# Patient Record
Sex: FEMALE | Race: WHITE | Employment: OTHER | URBAN - NONMETROPOLITAN AREA
[De-identification: names, ages, dates, MRNs, and addresses within clinical notes are randomized per-mention and may not be internally consistent; named-entity substitution may affect disease eponyms.]

---

## 2017-04-11 ENCOUNTER — OFFICE VISIT (OUTPATIENT)
Dept: FAMILY MEDICINE CLINIC | Age: 72
End: 2017-04-11

## 2017-04-11 VITALS
WEIGHT: 144 LBS | HEIGHT: 62 IN | HEART RATE: 60 BPM | OXYGEN SATURATION: 97 % | BODY MASS INDEX: 26.5 KG/M2 | DIASTOLIC BLOOD PRESSURE: 68 MMHG | SYSTOLIC BLOOD PRESSURE: 128 MMHG

## 2017-04-11 DIAGNOSIS — M65.312 TRIGGER THUMB OF LEFT HAND: ICD-10-CM

## 2017-04-11 DIAGNOSIS — Z01.818 PREOPERATIVE CLEARANCE: Primary | ICD-10-CM

## 2017-04-11 DIAGNOSIS — E78.5 HYPERLIPIDEMIA, UNSPECIFIED HYPERLIPIDEMIA TYPE: ICD-10-CM

## 2017-04-11 DIAGNOSIS — I10 ESSENTIAL HYPERTENSION: ICD-10-CM

## 2017-04-11 DIAGNOSIS — Z12.31 SCREENING MAMMOGRAM, ENCOUNTER FOR: ICD-10-CM

## 2017-04-11 DIAGNOSIS — E78.2 MIXED HYPERLIPIDEMIA: ICD-10-CM

## 2017-04-11 PROCEDURE — 99214 OFFICE O/P EST MOD 30 MIN: CPT | Performed by: FAMILY MEDICINE

## 2017-04-11 RX ORDER — FENOFIBRATE 160 MG/1
TABLET ORAL
Qty: 90 TABLET | Refills: 2
Start: 2017-04-11 | End: 2018-06-05 | Stop reason: SDUPTHER

## 2017-04-11 ASSESSMENT — ENCOUNTER SYMPTOMS
BLURRED VISION: 0
ABDOMINAL PAIN: 0
SHORTNESS OF BREATH: 0

## 2017-05-02 ENCOUNTER — HOSPITAL ENCOUNTER (OUTPATIENT)
Dept: PREADMISSION TESTING | Age: 72
Discharge: HOME | End: 2017-05-02
Payer: MEDICARE

## 2017-05-02 VITALS
HEIGHT: 61 IN | BODY MASS INDEX: 27.94 KG/M2 | WEIGHT: 148 LBS | OXYGEN SATURATION: 97 % | TEMPERATURE: 97.4 F | HEART RATE: 55 BPM | RESPIRATION RATE: 16 BRPM | DIASTOLIC BLOOD PRESSURE: 73 MMHG | SYSTOLIC BLOOD PRESSURE: 157 MMHG

## 2017-05-02 LAB
ANION GAP SERPL CALCULATED.3IONS-SCNC: 10 MEQ/L (ref 7–13)
BUN BLDV-MCNC: 12 MG/DL (ref 8–23)
CALCIUM SERPL-MCNC: 10.4 MG/DL (ref 8.6–10.2)
CHLORIDE BLD-SCNC: 104 MEQ/L (ref 98–107)
CO2: 28 MEQ/L (ref 22–29)
CREAT SERPL-MCNC: 0.77 MG/DL (ref 0.5–0.9)
GFR AFRICAN AMERICAN: >60
GFR NON-AFRICAN AMERICAN: >60
GLUCOSE BLD-MCNC: 81 MG/DL (ref 74–109)
HCT VFR BLD CALC: 45.6 % (ref 37–47)
HEMOGLOBIN: 15.1 G/DL (ref 12–16)
MCH RBC QN AUTO: 29.4 PG (ref 27–31.3)
MCHC RBC AUTO-ENTMCNC: 33 % (ref 33–37)
MCV RBC AUTO: 89 FL (ref 82–100)
PDW BLD-RTO: 13.9 % (ref 11.5–14.5)
PLATELET # BLD: 212 K/UL (ref 130–400)
POTASSIUM SERPL-SCNC: 5.3 MEQ/L (ref 3.5–5.1)
RBC # BLD: 5.13 M/UL (ref 4.2–5.4)
SODIUM BLD-SCNC: 142 MEQ/L (ref 132–144)
WBC # BLD: 8.3 K/UL (ref 4.8–10.8)

## 2017-05-02 PROCEDURE — 85027 COMPLETE CBC AUTOMATED: CPT

## 2017-05-02 PROCEDURE — 93005 ELECTROCARDIOGRAM TRACING: CPT

## 2017-05-02 PROCEDURE — 80048 BASIC METABOLIC PNL TOTAL CA: CPT

## 2017-05-02 RX ORDER — SODIUM CHLORIDE 0.9 % (FLUSH) 0.9 %
10 SYRINGE (ML) INJECTION PRN
Status: CANCELLED | OUTPATIENT
Start: 2017-05-02

## 2017-05-02 RX ORDER — SODIUM CHLORIDE 0.9 % (FLUSH) 0.9 %
10 SYRINGE (ML) INJECTION EVERY 12 HOURS SCHEDULED
Status: CANCELLED | OUTPATIENT
Start: 2017-05-02

## 2017-05-02 RX ORDER — SODIUM CHLORIDE, SODIUM LACTATE, POTASSIUM CHLORIDE, CALCIUM CHLORIDE 600; 310; 30; 20 MG/100ML; MG/100ML; MG/100ML; MG/100ML
INJECTION, SOLUTION INTRAVENOUS CONTINUOUS
Status: CANCELLED | OUTPATIENT
Start: 2017-05-02

## 2017-05-02 RX ORDER — LIDOCAINE HYDROCHLORIDE 10 MG/ML
1 INJECTION, SOLUTION EPIDURAL; INFILTRATION; INTRACAUDAL; PERINEURAL
Status: CANCELLED | OUTPATIENT
Start: 2017-05-02 | End: 2017-05-02

## 2017-05-04 LAB
EKG ATRIAL RATE: 53 BPM
EKG P AXIS: 53 DEGREES
EKG P-R INTERVAL: 188 MS
EKG Q-T INTERVAL: 428 MS
EKG QRS DURATION: 84 MS
EKG QTC CALCULATION (BAZETT): 401 MS
EKG R AXIS: 14 DEGREES
EKG T AXIS: 69 DEGREES
EKG VENTRICULAR RATE: 53 BPM

## 2017-05-08 ENCOUNTER — ANESTHESIA EVENT (OUTPATIENT)
Dept: OPERATING ROOM | Age: 72
End: 2017-05-08
Payer: MEDICARE

## 2017-05-09 ENCOUNTER — ANESTHESIA (OUTPATIENT)
Dept: OPERATING ROOM | Age: 72
End: 2017-05-09
Payer: MEDICARE

## 2017-05-09 ENCOUNTER — HOSPITAL ENCOUNTER (OUTPATIENT)
Age: 72
Setting detail: OUTPATIENT SURGERY
Discharge: HOME | End: 2017-05-09
Attending: ORTHOPAEDIC SURGERY | Admitting: ORTHOPAEDIC SURGERY
Payer: MEDICARE

## 2017-05-09 VITALS
RESPIRATION RATE: 16 BRPM | SYSTOLIC BLOOD PRESSURE: 150 MMHG | OXYGEN SATURATION: 97 % | TEMPERATURE: 98.7 F | DIASTOLIC BLOOD PRESSURE: 71 MMHG | HEART RATE: 50 BPM

## 2017-05-09 VITALS
RESPIRATION RATE: 18 BRPM | OXYGEN SATURATION: 98 % | DIASTOLIC BLOOD PRESSURE: 58 MMHG | SYSTOLIC BLOOD PRESSURE: 123 MMHG

## 2017-05-09 PROCEDURE — 7100000010 HC PHASE II RECOVERY - FIRST 15 MIN: Performed by: ORTHOPAEDIC SURGERY

## 2017-05-09 PROCEDURE — 2580000003 HC RX 258: Performed by: ORTHOPAEDIC SURGERY

## 2017-05-09 PROCEDURE — 6360000002 HC RX W HCPCS: Performed by: NURSE ANESTHETIST, CERTIFIED REGISTERED

## 2017-05-09 PROCEDURE — 3700000000 HC ANESTHESIA ATTENDED CARE: Performed by: ORTHOPAEDIC SURGERY

## 2017-05-09 PROCEDURE — 2500000003 HC RX 250 WO HCPCS: Performed by: ORTHOPAEDIC SURGERY

## 2017-05-09 PROCEDURE — 3600000002 HC SURGERY LEVEL 2 BASE: Performed by: ORTHOPAEDIC SURGERY

## 2017-05-09 PROCEDURE — 2580000003 HC RX 258: Performed by: STUDENT IN AN ORGANIZED HEALTH CARE EDUCATION/TRAINING PROGRAM

## 2017-05-09 PROCEDURE — 3600000012 HC SURGERY LEVEL 2 ADDTL 15MIN: Performed by: ORTHOPAEDIC SURGERY

## 2017-05-09 PROCEDURE — 2500000003 HC RX 250 WO HCPCS: Performed by: STUDENT IN AN ORGANIZED HEALTH CARE EDUCATION/TRAINING PROGRAM

## 2017-05-09 PROCEDURE — 6360000002 HC RX W HCPCS: Performed by: ORTHOPAEDIC SURGERY

## 2017-05-09 PROCEDURE — 6370000000 HC RX 637 (ALT 250 FOR IP): Performed by: ORTHOPAEDIC SURGERY

## 2017-05-09 PROCEDURE — 7100000011 HC PHASE II RECOVERY - ADDTL 15 MIN: Performed by: ORTHOPAEDIC SURGERY

## 2017-05-09 PROCEDURE — 2500000003 HC RX 250 WO HCPCS: Performed by: NURSE ANESTHETIST, CERTIFIED REGISTERED

## 2017-05-09 PROCEDURE — 3700000001 HC ADD 15 MINUTES (ANESTHESIA): Performed by: ORTHOPAEDIC SURGERY

## 2017-05-09 RX ORDER — MEPERIDINE HYDROCHLORIDE 25 MG/ML
12.5 INJECTION INTRAMUSCULAR; INTRAVENOUS; SUBCUTANEOUS EVERY 5 MIN PRN
Status: DISCONTINUED | OUTPATIENT
Start: 2017-05-09 | End: 2017-05-09 | Stop reason: HOSPADM

## 2017-05-09 RX ORDER — LIDOCAINE HYDROCHLORIDE 10 MG/ML
1 INJECTION, SOLUTION EPIDURAL; INFILTRATION; INTRACAUDAL; PERINEURAL
Status: COMPLETED | OUTPATIENT
Start: 2017-05-09 | End: 2017-05-09

## 2017-05-09 RX ORDER — OXYCODONE HYDROCHLORIDE AND ACETAMINOPHEN 5; 325 MG/1; MG/1
1 TABLET ORAL EVERY 4 HOURS PRN
Status: DISCONTINUED | OUTPATIENT
Start: 2017-05-09 | End: 2017-05-09 | Stop reason: HOSPADM

## 2017-05-09 RX ORDER — ONDANSETRON 2 MG/ML
INJECTION INTRAMUSCULAR; INTRAVENOUS PRN
Status: DISCONTINUED | OUTPATIENT
Start: 2017-05-09 | End: 2017-05-09 | Stop reason: SDUPTHER

## 2017-05-09 RX ORDER — SODIUM CHLORIDE 0.9 % (FLUSH) 0.9 %
10 SYRINGE (ML) INJECTION PRN
Status: DISCONTINUED | OUTPATIENT
Start: 2017-05-09 | End: 2017-05-09 | Stop reason: HOSPADM

## 2017-05-09 RX ORDER — LIDOCAINE HYDROCHLORIDE 20 MG/ML
INJECTION, SOLUTION INFILTRATION; PERINEURAL PRN
Status: DISCONTINUED | OUTPATIENT
Start: 2017-05-09 | End: 2017-05-09 | Stop reason: SDUPTHER

## 2017-05-09 RX ORDER — MORPHINE SULFATE 2 MG/ML
2 INJECTION, SOLUTION INTRAMUSCULAR; INTRAVENOUS
Status: DISCONTINUED | OUTPATIENT
Start: 2017-05-09 | End: 2017-05-09 | Stop reason: HOSPADM

## 2017-05-09 RX ORDER — PROPOFOL 10 MG/ML
INJECTION, EMULSION INTRAVENOUS PRN
Status: DISCONTINUED | OUTPATIENT
Start: 2017-05-09 | End: 2017-05-09 | Stop reason: SDUPTHER

## 2017-05-09 RX ORDER — MIDAZOLAM HYDROCHLORIDE 1 MG/ML
INJECTION INTRAMUSCULAR; INTRAVENOUS PRN
Status: DISCONTINUED | OUTPATIENT
Start: 2017-05-09 | End: 2017-05-09 | Stop reason: SDUPTHER

## 2017-05-09 RX ORDER — ONDANSETRON 2 MG/ML
4 INJECTION INTRAMUSCULAR; INTRAVENOUS EVERY 6 HOURS PRN
Status: DISCONTINUED | OUTPATIENT
Start: 2017-05-09 | End: 2017-05-09 | Stop reason: HOSPADM

## 2017-05-09 RX ORDER — ACETAMINOPHEN 325 MG/1
650 TABLET ORAL EVERY 4 HOURS PRN
Status: DISCONTINUED | OUTPATIENT
Start: 2017-05-09 | End: 2017-05-09 | Stop reason: HOSPADM

## 2017-05-09 RX ORDER — MAGNESIUM HYDROXIDE 1200 MG/15ML
LIQUID ORAL CONTINUOUS PRN
Status: DISCONTINUED | OUTPATIENT
Start: 2017-05-09 | End: 2017-05-09 | Stop reason: HOSPADM

## 2017-05-09 RX ORDER — HYDROCODONE BITARTRATE AND ACETAMINOPHEN 5; 325 MG/1; MG/1
2 TABLET ORAL PRN
Status: DISCONTINUED | OUTPATIENT
Start: 2017-05-09 | End: 2017-05-09 | Stop reason: HOSPADM

## 2017-05-09 RX ORDER — MORPHINE SULFATE 4 MG/ML
4 INJECTION, SOLUTION INTRAMUSCULAR; INTRAVENOUS
Status: DISCONTINUED | OUTPATIENT
Start: 2017-05-09 | End: 2017-05-09 | Stop reason: HOSPADM

## 2017-05-09 RX ORDER — FENTANYL CITRATE 50 UG/ML
INJECTION, SOLUTION INTRAMUSCULAR; INTRAVENOUS PRN
Status: DISCONTINUED | OUTPATIENT
Start: 2017-05-09 | End: 2017-05-09 | Stop reason: SDUPTHER

## 2017-05-09 RX ORDER — METOCLOPRAMIDE HYDROCHLORIDE 5 MG/ML
10 INJECTION INTRAMUSCULAR; INTRAVENOUS
Status: DISCONTINUED | OUTPATIENT
Start: 2017-05-09 | End: 2017-05-09 | Stop reason: HOSPADM

## 2017-05-09 RX ORDER — SODIUM CHLORIDE 9 MG/ML
50 INJECTION, SOLUTION INTRAVENOUS CONTINUOUS
Status: DISCONTINUED | OUTPATIENT
Start: 2017-05-09 | End: 2017-05-09 | Stop reason: HOSPADM

## 2017-05-09 RX ORDER — SODIUM CHLORIDE, SODIUM LACTATE, POTASSIUM CHLORIDE, CALCIUM CHLORIDE 600; 310; 30; 20 MG/100ML; MG/100ML; MG/100ML; MG/100ML
INJECTION, SOLUTION INTRAVENOUS CONTINUOUS
Status: DISCONTINUED | OUTPATIENT
Start: 2017-05-09 | End: 2017-05-09 | Stop reason: HOSPADM

## 2017-05-09 RX ORDER — HYDROCODONE BITARTRATE AND ACETAMINOPHEN 5; 325 MG/1; MG/1
1 TABLET ORAL PRN
Status: DISCONTINUED | OUTPATIENT
Start: 2017-05-09 | End: 2017-05-09 | Stop reason: HOSPADM

## 2017-05-09 RX ORDER — BUPIVACAINE HYDROCHLORIDE 5 MG/ML
INJECTION, SOLUTION EPIDURAL; INTRACAUDAL PRN
Status: DISCONTINUED | OUTPATIENT
Start: 2017-05-09 | End: 2017-05-09 | Stop reason: HOSPADM

## 2017-05-09 RX ORDER — FENTANYL CITRATE 50 UG/ML
50 INJECTION, SOLUTION INTRAMUSCULAR; INTRAVENOUS EVERY 10 MIN PRN
Status: DISCONTINUED | OUTPATIENT
Start: 2017-05-09 | End: 2017-05-09 | Stop reason: HOSPADM

## 2017-05-09 RX ORDER — SODIUM CHLORIDE 0.9 % (FLUSH) 0.9 %
10 SYRINGE (ML) INJECTION EVERY 12 HOURS SCHEDULED
Status: DISCONTINUED | OUTPATIENT
Start: 2017-05-09 | End: 2017-05-09 | Stop reason: HOSPADM

## 2017-05-09 RX ORDER — DEXAMETHASONE SODIUM PHOSPHATE 10 MG/ML
INJECTION INTRAMUSCULAR; INTRAVENOUS PRN
Status: DISCONTINUED | OUTPATIENT
Start: 2017-05-09 | End: 2017-05-09 | Stop reason: SDUPTHER

## 2017-05-09 RX ORDER — ONDANSETRON 2 MG/ML
4 INJECTION INTRAMUSCULAR; INTRAVENOUS
Status: DISCONTINUED | OUTPATIENT
Start: 2017-05-09 | End: 2017-05-09 | Stop reason: HOSPADM

## 2017-05-09 RX ORDER — PROPOFOL 10 MG/ML
INJECTION, EMULSION INTRAVENOUS CONTINUOUS PRN
Status: DISCONTINUED | OUTPATIENT
Start: 2017-05-09 | End: 2017-05-09 | Stop reason: SDUPTHER

## 2017-05-09 RX ORDER — GINSENG 100 MG
CAPSULE ORAL PRN
Status: DISCONTINUED | OUTPATIENT
Start: 2017-05-09 | End: 2017-05-09 | Stop reason: HOSPADM

## 2017-05-09 RX ORDER — OXYCODONE HYDROCHLORIDE AND ACETAMINOPHEN 5; 325 MG/1; MG/1
2 TABLET ORAL EVERY 4 HOURS PRN
Status: DISCONTINUED | OUTPATIENT
Start: 2017-05-09 | End: 2017-05-09 | Stop reason: HOSPADM

## 2017-05-09 RX ORDER — DIPHENHYDRAMINE HYDROCHLORIDE 50 MG/ML
12.5 INJECTION INTRAMUSCULAR; INTRAVENOUS
Status: DISCONTINUED | OUTPATIENT
Start: 2017-05-09 | End: 2017-05-09 | Stop reason: HOSPADM

## 2017-05-09 RX ORDER — SODIUM CHLORIDE 0.9 % (FLUSH) 0.9 %
SYRINGE (ML) INJECTION
Status: DISCONTINUED
Start: 2017-05-09 | End: 2017-05-09 | Stop reason: HOSPADM

## 2017-05-09 RX ADMIN — LIDOCAINE HYDROCHLORIDE 0.1 ML: 10 INJECTION, SOLUTION EPIDURAL; INFILTRATION; INTRACAUDAL; PERINEURAL at 06:28

## 2017-05-09 RX ADMIN — Medication 2 G: at 07:22

## 2017-05-09 RX ADMIN — DEXAMETHASONE SODIUM PHOSPHATE 4 MG: 10 INJECTION INTRAMUSCULAR; INTRAVENOUS at 07:37

## 2017-05-09 RX ADMIN — ONDANSETRON 4 MG: 2 INJECTION, SOLUTION INTRAMUSCULAR; INTRAVENOUS at 07:37

## 2017-05-09 RX ADMIN — MIDAZOLAM HYDROCHLORIDE 2 MG: 1 INJECTION, SOLUTION INTRAMUSCULAR; INTRAVENOUS at 07:22

## 2017-05-09 RX ADMIN — PROPOFOL 120 MCG/KG/MIN: 10 INJECTION, EMULSION INTRAVENOUS at 07:27

## 2017-05-09 RX ADMIN — PROPOFOL 30 MG: 10 INJECTION, EMULSION INTRAVENOUS at 07:27

## 2017-05-09 RX ADMIN — SODIUM CHLORIDE, POTASSIUM CHLORIDE, SODIUM LACTATE AND CALCIUM CHLORIDE: 600; 310; 30; 20 INJECTION, SOLUTION INTRAVENOUS at 06:29

## 2017-05-09 RX ADMIN — LIDOCAINE HYDROCHLORIDE 60 MG: 20 INJECTION, SOLUTION INFILTRATION; PERINEURAL at 07:27

## 2017-05-09 RX ADMIN — FENTANYL CITRATE 25 MCG: 50 INJECTION, SOLUTION INTRAMUSCULAR; INTRAVENOUS at 07:37

## 2017-06-05 DIAGNOSIS — I10 ESSENTIAL HYPERTENSION: ICD-10-CM

## 2017-06-05 RX ORDER — CARVEDILOL 6.25 MG/1
TABLET ORAL
Qty: 180 TABLET | Refills: 3 | Status: SHIPPED | OUTPATIENT
Start: 2017-06-05 | End: 2018-08-25 | Stop reason: SDUPTHER

## 2017-06-05 RX ORDER — ATORVASTATIN CALCIUM 10 MG/1
10 TABLET, FILM COATED ORAL NIGHTLY
Qty: 90 TABLET | Refills: 3 | Status: SHIPPED | OUTPATIENT
Start: 2017-06-05 | End: 2018-05-03 | Stop reason: SDUPTHER

## 2017-06-23 ENCOUNTER — HOSPITAL ENCOUNTER (OUTPATIENT)
Dept: WOMENS IMAGING | Age: 72
Discharge: HOME | End: 2017-06-23
Payer: MEDICARE

## 2017-06-23 DIAGNOSIS — Z12.31 SCREENING MAMMOGRAM, ENCOUNTER FOR: ICD-10-CM

## 2017-06-23 PROCEDURE — 77063 BREAST TOMOSYNTHESIS BI: CPT

## 2017-10-23 ENCOUNTER — TELEPHONE (OUTPATIENT)
Dept: FAMILY MEDICINE CLINIC | Age: 72
End: 2017-10-23

## 2017-11-14 ENCOUNTER — OFFICE VISIT (OUTPATIENT)
Dept: FAMILY MEDICINE CLINIC | Age: 72
End: 2017-11-14

## 2017-11-14 VITALS
OXYGEN SATURATION: 98 % | WEIGHT: 143.6 LBS | SYSTOLIC BLOOD PRESSURE: 116 MMHG | HEART RATE: 65 BPM | BODY MASS INDEX: 26.43 KG/M2 | HEIGHT: 62 IN | DIASTOLIC BLOOD PRESSURE: 62 MMHG

## 2017-11-14 DIAGNOSIS — E78.2 MIXED HYPERLIPIDEMIA: ICD-10-CM

## 2017-11-14 DIAGNOSIS — I10 ESSENTIAL HYPERTENSION: ICD-10-CM

## 2017-11-14 DIAGNOSIS — R06.2 WHEEZING: Primary | ICD-10-CM

## 2017-11-14 PROCEDURE — 99214 OFFICE O/P EST MOD 30 MIN: CPT | Performed by: FAMILY MEDICINE

## 2017-11-14 RX ORDER — AZITHROMYCIN 250 MG/1
TABLET, FILM COATED ORAL
Qty: 1 PACKET | Refills: 0 | Status: SHIPPED | OUTPATIENT
Start: 2017-11-14 | End: 2017-11-24

## 2017-11-14 RX ORDER — ZOLPIDEM TARTRATE 10 MG/1
10 TABLET ORAL NIGHTLY PRN
Qty: 14 TABLET | Refills: 0 | Status: SHIPPED | OUTPATIENT
Start: 2017-11-14 | End: 2017-11-28

## 2017-11-14 ASSESSMENT — ENCOUNTER SYMPTOMS
WHEEZING: 1
SHORTNESS OF BREATH: 0
BLURRED VISION: 0
ABDOMINAL PAIN: 0

## 2017-11-14 NOTE — PROGRESS NOTES
Subjective  Link Antolin, 67 y.o. female presents today with:  Chief Complaint   Patient presents with   Ciaran Perry     is having some wheezing more at night       Hypertension   This is a chronic problem. The current episode started more than 1 year ago. The problem has been rapidly improving since onset. The problem is controlled. Pertinent negatives include no blurred vision, chest pain, palpitations or shortness of breath. There are no associated agents to hypertension. Risk factors for coronary artery disease include dyslipidemia and post-menopausal state. Past treatments include beta blockers. The current treatment provides significant improvement. There is no history of kidney disease. There is no history of chronic renal disease. Wheezing    This is a chronic problem. The current episode started more than 1 year ago. The problem occurs intermittently. The problem has been waxing and waning. Pertinent negatives include no abdominal pain, chest pain, fever, rash or shortness of breath. Nothing aggravates the symptoms. She has tried nothing for the symptoms. Here today for 6 month f/u on chronic problems including HTN, hyperlipidemia. She c/o wheezing at night. Going to [facility] in Jan requesting wojciech and Sarah Hua. Review of Systems   Constitutional: Negative for fever. Eyes: Negative for blurred vision. Respiratory: Positive for wheezing. Negative for shortness of breath. Cardiovascular: Negative for chest pain and palpitations. Gastrointestinal: Negative for abdominal pain. Skin: Negative for rash.        Past Medical History:   Diagnosis Date    Abnormal EKG     BPPV (benign paroxysmal positional vertigo)     Colon polyps 09/2016    needs f/u in 5 years    Elevated liver enzymes 11/5/2013    Family history of heart disease     Heart murmur     History of bone density study 4/7/11    History of mammography, diagnostic 4/13/11    Category 3 Left mammogram/ Category 2 Right mammogram    Hyperlipidemia     Hypertension     Internal hemorrhoids     Normal cardiac stress test 12/09/2015    Osteoarthritis, multiple sites     shoulders and knees    Osteopenia 06/09/2015    Recurrent cold sores     Rheumatic fever     Trigger thumb of left hand      Past Surgical History:   Procedure Laterality Date    COLONOSCOPY  7/12/2006    COLONOSCOPY  09/12/2016    EN MYERS MD    [place] INCISE FINGER TENDON SHEATH Left 5/9/2017    LEFT HAND  FINGER TRIGGER RELEASE THUMB, SUPINE  performed by Rebecca Neely MD at [place] [address] Marital status:      Spouse name: N/A    Number of children: 2    Years of education: N/A     Occupational History    retired      Social History Main Topics    Smoking status: Former Smoker     Types: Cigarettes    Smokeless tobacco: Never Used      Comment: quit in her 42's    Alcohol use 0.0 oz/week      Comment: wine 2 glasses per week    Drug use: No    Sexual activity: Not on file     Other Topics Concern    Not on file     Social History Narrative    Has children that live in [place]     Family History   Problem Relation Age of Onset    Asthma Father     Diabetes Father     Heart Disease Mother     Diabetes Brother     Heart Disease Brother     Kidney Disease Brother     Other Daughter      shoulder surgery     No Known Allergies  Current Outpatient Prescriptions   Medication Sig Dispense Refill    azithromycin (ZITHROMAX) 250 MG tablet Take 2 tabs (500 mg) on Day 1, and take 1 tab (250 mg) on days 2 through 5. 1 packet 0    zolpidem (AMBIEN) 10 MG tablet Take 1 tablet by mouth nightly as needed for Sleep  1/2 to 1 to po qhs prn insomnia.  14 tablet 0    carvedilol (COREG) 6.25 MG tablet TAKE 1 TABLET TWICE A  tablet 3    atorvastatin (LIPITOR) 10 MG tablet Take 1 tablet by mouth nightly 90 tablet 3    fenofibrate 160 MG tablet Take 1/2 pill daily 90 tablet 2    acyclovir (ZOVIRAX) 5 % ointment

## 2017-12-04 DIAGNOSIS — I10 ESSENTIAL HYPERTENSION: ICD-10-CM

## 2017-12-04 DIAGNOSIS — E78.2 MIXED HYPERLIPIDEMIA: ICD-10-CM

## 2017-12-04 LAB
ALT SERPL-CCNC: 23 U/L (ref 0–33)
ANION GAP SERPL CALCULATED.3IONS-SCNC: 13 MEQ/L (ref 7–13)
AST SERPL-CCNC: 21 U/L (ref 0–35)
BASOPHILS ABSOLUTE: 0.1 K/UL (ref 0–0.2)
BASOPHILS RELATIVE PERCENT: 0.8 %
BUN BLDV-MCNC: 12 MG/DL (ref 8–23)
CALCIUM SERPL-MCNC: 9.6 MG/DL (ref 8.6–10.2)
CHLORIDE BLD-SCNC: 103 MEQ/L (ref 98–107)
CHOLESTEROL, TOTAL: 140 MG/DL (ref 0–199)
CO2: 28 MEQ/L (ref 22–29)
CREAT SERPL-MCNC: 0.7 MG/DL (ref 0.5–0.9)
EOSINOPHILS ABSOLUTE: 0.4 K/UL (ref 0–0.7)
EOSINOPHILS RELATIVE PERCENT: 5.8 %
GFR AFRICAN AMERICAN: >60
GFR NON-AFRICAN AMERICAN: >60
GLUCOSE BLD-MCNC: 103 MG/DL (ref 74–109)
HCT VFR BLD CALC: 47.2 % (ref 37–47)
HDLC SERPL-MCNC: 42 MG/DL (ref 40–59)
HEMOGLOBIN: 14.9 G/DL (ref 12–16)
LDL CHOLESTEROL CALCULATED: 71 MG/DL (ref 0–129)
LYMPHOCYTES ABSOLUTE: 2.1 K/UL (ref 1–4.8)
LYMPHOCYTES RELATIVE PERCENT: 29 %
MCH RBC QN AUTO: 29.1 PG (ref 27–31.3)
MCHC RBC AUTO-ENTMCNC: 31.6 % (ref 33–37)
MCV RBC AUTO: 91.9 FL (ref 82–100)
MONOCYTES ABSOLUTE: 0.6 K/UL (ref 0.2–0.8)
MONOCYTES RELATIVE PERCENT: 8.5 %
NEUTROPHILS ABSOLUTE: 4.1 K/UL (ref 1.4–6.5)
NEUTROPHILS RELATIVE PERCENT: 55.9 %
PDW BLD-RTO: 13.8 % (ref 11.5–14.5)
PLATELET # BLD: 221 K/UL (ref 130–400)
POTASSIUM SERPL-SCNC: 4.9 MEQ/L (ref 3.5–5.1)
RBC # BLD: 5.14 M/UL (ref 4.2–5.4)
SODIUM BLD-SCNC: 144 MEQ/L (ref 132–144)
TRIGL SERPL-MCNC: 137 MG/DL (ref 0–200)
WBC # BLD: 7.3 K/UL (ref 4.8–10.8)

## 2018-01-03 ENCOUNTER — HOSPITAL ENCOUNTER (OUTPATIENT)
Dept: PULMONOLOGY | Age: 73
Discharge: HOME | End: 2018-01-03
Payer: MEDICARE

## 2018-01-03 PROCEDURE — 94726 PLETHYSMOGRAPHY LUNG VOLUMES: CPT

## 2018-01-03 PROCEDURE — 94010 BREATHING CAPACITY TEST: CPT

## 2018-01-03 PROCEDURE — 94729 DIFFUSING CAPACITY: CPT

## 2018-01-15 PROCEDURE — 94729 DIFFUSING CAPACITY: CPT | Performed by: INTERNAL MEDICINE

## 2018-01-15 PROCEDURE — 94010 BREATHING CAPACITY TEST: CPT | Performed by: INTERNAL MEDICINE

## 2018-01-15 PROCEDURE — 94726 PLETHYSMOGRAPHY LUNG VOLUMES: CPT | Performed by: INTERNAL MEDICINE

## 2018-02-01 ENCOUNTER — TELEPHONE (OUTPATIENT)
Dept: FAMILY MEDICINE CLINIC | Age: 73
End: 2018-02-01

## 2018-02-02 ENCOUNTER — OFFICE VISIT (OUTPATIENT)
Dept: FAMILY MEDICINE CLINIC | Age: 73
End: 2018-02-02
Payer: MEDICARE

## 2018-02-02 VITALS
DIASTOLIC BLOOD PRESSURE: 72 MMHG | HEIGHT: 62 IN | HEART RATE: 64 BPM | WEIGHT: 150 LBS | BODY MASS INDEX: 27.6 KG/M2 | TEMPERATURE: 97.4 F | SYSTOLIC BLOOD PRESSURE: 138 MMHG | OXYGEN SATURATION: 97 %

## 2018-02-02 DIAGNOSIS — J20.9 ACUTE BRONCHITIS, UNSPECIFIED ORGANISM: Primary | ICD-10-CM

## 2018-02-02 LAB
INFLUENZA A ANTIBODY: NEGATIVE
INFLUENZA B ANTIBODY: NEGATIVE

## 2018-02-02 PROCEDURE — 99213 OFFICE O/P EST LOW 20 MIN: CPT | Performed by: NURSE PRACTITIONER

## 2018-02-02 PROCEDURE — 87804 INFLUENZA ASSAY W/OPTIC: CPT | Performed by: NURSE PRACTITIONER

## 2018-02-02 RX ORDER — DOXYCYCLINE HYCLATE 100 MG
100 TABLET ORAL 2 TIMES DAILY
Qty: 20 TABLET | Refills: 0 | Status: SHIPPED | OUTPATIENT
Start: 2018-02-02 | End: 2018-02-12

## 2018-02-02 ASSESSMENT — ENCOUNTER SYMPTOMS
SINUS PRESSURE: 0
SORE THROAT: 0
RHINORRHEA: 1
SINUS PAIN: 0
COUGH: 1
WHEEZING: 1
COLOR CHANGE: 0
SHORTNESS OF BREATH: 0

## 2018-02-02 NOTE — PROGRESS NOTES
Subjective  Chief Complaint   Patient presents with    Cough     pt states that she has had a cough for over 6 months but thsi is different she states. just resturned from [place] [facility]. was recently on [employer] Needs that she finished on Sunday. Cough   This is a recurrent problem. The current episode started 1 to 4 weeks ago. The problem has been gradually worsening. The cough is productive of sputum (no color to it). Associated symptoms include headaches (due to cough), postnasal drip, rhinorrhea and wheezing (this has been ongoing ). Pertinent negatives include no chest pain, chills, ear congestion, ear pain, fever, nasal congestion, rash, sore throat, shortness of breath or sweats. The symptoms are aggravated by cold air and lying down. Treatments tried: z pack. The treatment provided mild relief. Her past medical history is significant for bronchitis. The cough has been an ongoing issue and Dr. Luda Valentin gave her a zpack because she was traveling and she did start it Wednesday because this cough is much different than the cough she has been working her up for. Patient had PFTs January 3rd.        Patient Active Problem List    Diagnosis Date Noted    Cough     Osteoarthritis, multiple sites     Recurrent cold sores     BPPV (benign paroxysmal positional vertigo)     Hypertension 09/07/2012    Hyperlipidemia 09/07/2012     Past Medical History:   Diagnosis Date    Abnormal EKG     BPPV (benign paroxysmal positional vertigo)     Colon polyps 09/2016    needs f/u in 5 years    Elevated liver enzymes 11/5/2013    Family history of heart disease     Heart murmur     History of bone density study 4/7/11    History of mammography, diagnostic 4/13/11    Category 3 Left mammogram/ Category 2 Right mammogram    Hyperlipidemia     Hypertension     Internal hemorrhoids     Normal cardiac stress test 12/09/2015    Osteoarthritis, multiple sites     shoulders and knees    Osteopenia 06/09/2015

## 2018-05-03 ENCOUNTER — OFFICE VISIT (OUTPATIENT)
Dept: FAMILY MEDICINE CLINIC | Age: 73
End: 2018-05-03
Payer: MEDICARE

## 2018-05-03 VITALS
HEART RATE: 60 BPM | DIASTOLIC BLOOD PRESSURE: 52 MMHG | BODY MASS INDEX: 27.53 KG/M2 | HEIGHT: 62 IN | OXYGEN SATURATION: 96 % | SYSTOLIC BLOOD PRESSURE: 108 MMHG | WEIGHT: 149.6 LBS

## 2018-05-03 DIAGNOSIS — Z12.31 SCREENING MAMMOGRAM, ENCOUNTER FOR: ICD-10-CM

## 2018-05-03 DIAGNOSIS — N81.10 FEMALE BLADDER PROLAPSE: ICD-10-CM

## 2018-05-03 DIAGNOSIS — T23.222A PARTIAL THICKNESS BURN OF FINGER OF LEFT HAND, INITIAL ENCOUNTER: ICD-10-CM

## 2018-05-03 DIAGNOSIS — I10 ESSENTIAL HYPERTENSION: Primary | ICD-10-CM

## 2018-05-03 DIAGNOSIS — E78.2 MIXED HYPERLIPIDEMIA: ICD-10-CM

## 2018-05-03 PROCEDURE — G8510 SCR DEP NEG, NO PLAN REQD: HCPCS | Performed by: FAMILY MEDICINE

## 2018-05-03 PROCEDURE — 99213 OFFICE O/P EST LOW 20 MIN: CPT | Performed by: FAMILY MEDICINE

## 2018-05-03 PROCEDURE — 3288F FALL RISK ASSESSMENT DOCD: CPT | Performed by: FAMILY MEDICINE

## 2018-05-03 RX ORDER — ATORVASTATIN CALCIUM 10 MG/1
10 TABLET, FILM COATED ORAL NIGHTLY
Qty: 90 TABLET | Refills: 3 | Status: ON HOLD | OUTPATIENT
Start: 2018-05-03 | End: 2019-02-14

## 2018-05-03 ASSESSMENT — PATIENT HEALTH QUESTIONNAIRE - PHQ9
2. FEELING DOWN, DEPRESSED OR HOPELESS: 0
SUM OF ALL RESPONSES TO PHQ QUESTIONS 1-9: 0
1. LITTLE INTEREST OR PLEASURE IN DOING THINGS: 0
SUM OF ALL RESPONSES TO PHQ9 QUESTIONS 1 & 2: 0

## 2018-05-03 ASSESSMENT — ENCOUNTER SYMPTOMS
ABDOMINAL PAIN: 0
SHORTNESS OF BREATH: 0

## 2018-05-15 ENCOUNTER — OFFICE VISIT (OUTPATIENT)
Dept: FAMILY MEDICINE CLINIC | Age: 73
End: 2018-05-15
Payer: MEDICARE

## 2018-05-15 VITALS
BODY MASS INDEX: 27.23 KG/M2 | DIASTOLIC BLOOD PRESSURE: 70 MMHG | HEIGHT: 62 IN | HEART RATE: 59 BPM | OXYGEN SATURATION: 96 % | SYSTOLIC BLOOD PRESSURE: 130 MMHG | WEIGHT: 148 LBS

## 2018-05-15 DIAGNOSIS — Z23 NEED FOR SHINGLES VACCINE: ICD-10-CM

## 2018-05-15 DIAGNOSIS — Z00.00 ROUTINE GENERAL MEDICAL EXAMINATION AT A HEALTH CARE FACILITY: ICD-10-CM

## 2018-05-15 DIAGNOSIS — Z78.0 ASYMPTOMATIC MENOPAUSAL STATE: ICD-10-CM

## 2018-05-15 DIAGNOSIS — R94.31 EKG ABNORMALITIES: Primary | ICD-10-CM

## 2018-05-15 PROCEDURE — 93000 ELECTROCARDIOGRAM COMPLETE: CPT | Performed by: FAMILY MEDICINE

## 2018-05-15 PROCEDURE — G0439 PPPS, SUBSEQ VISIT: HCPCS | Performed by: FAMILY MEDICINE

## 2018-05-15 ASSESSMENT — LIFESTYLE VARIABLES
HOW OFTEN DURING THE LAST YEAR HAVE YOU NEEDED AN ALCOHOLIC DRINK FIRST THING IN THE MORNING TO GET YOURSELF GOING AFTER A NIGHT OF HEAVY DRINKING: 0
HOW OFTEN DURING THE LAST YEAR HAVE YOU BEEN UNABLE TO REMEMBER WHAT HAPPENED THE NIGHT BEFORE BECAUSE YOU HAD BEEN DRINKING: 0
HAVE YOU OR SOMEONE ELSE BEEN INJURED AS A RESULT OF YOUR DRINKING: 0
AUDIT-C TOTAL SCORE: 2
HOW OFTEN DURING THE LAST YEAR HAVE YOU FAILED TO DO WHAT WAS NORMALLY EXPECTED FROM YOU BECAUSE OF DRINKING: 0
HAS A RELATIVE, FRIEND, DOCTOR, OR ANOTHER HEALTH PROFESSIONAL EXPRESSED CONCERN ABOUT YOUR DRINKING OR SUGGESTED YOU CUT DOWN: 0
AUDIT TOTAL SCORE: 2
HOW OFTEN DURING THE LAST YEAR HAVE YOU FOUND THAT YOU WERE NOT ABLE TO STOP DRINKING ONCE YOU HAD STARTED: 0
HOW OFTEN DO YOU HAVE A DRINK CONTAINING ALCOHOL: 2
HOW OFTEN DO YOU HAVE SIX OR MORE DRINKS ON ONE OCCASION: 0
HOW MANY STANDARD DRINKS CONTAINING ALCOHOL DO YOU HAVE ON A TYPICAL DAY: 0
HOW OFTEN DURING THE LAST YEAR HAVE YOU HAD A FEELING OF GUILT OR REMORSE AFTER DRINKING: 0

## 2018-05-15 ASSESSMENT — ANXIETY QUESTIONNAIRES: GAD7 TOTAL SCORE: 0

## 2018-05-15 ASSESSMENT — PATIENT HEALTH QUESTIONNAIRE - PHQ9: SUM OF ALL RESPONSES TO PHQ QUESTIONS 1-9: 0

## 2018-05-30 ENCOUNTER — HOSPITAL ENCOUNTER (OUTPATIENT)
Dept: NUCLEAR MEDICINE | Age: 73
Discharge: HOME | End: 2018-06-01
Payer: MEDICARE

## 2018-05-30 DIAGNOSIS — R94.31 EKG ABNORMALITIES: ICD-10-CM

## 2018-05-30 PROCEDURE — 78452 HT MUSCLE IMAGE SPECT MULT: CPT

## 2018-05-30 PROCEDURE — 2580000003 HC RX 258: Performed by: FAMILY MEDICINE

## 2018-05-30 PROCEDURE — 93017 CV STRESS TEST TRACING ONLY: CPT

## 2018-05-30 PROCEDURE — 3430000000 HC RX DIAGNOSTIC RADIOPHARMACEUTICAL: Performed by: FAMILY MEDICINE

## 2018-05-30 PROCEDURE — A9502 TC99M TETROFOSMIN: HCPCS | Performed by: FAMILY MEDICINE

## 2018-05-30 RX ORDER — SODIUM CHLORIDE 0.9 % (FLUSH) 0.9 %
10 SYRINGE (ML) INJECTION PRN
Status: DISCONTINUED | OUTPATIENT
Start: 2018-05-30 | End: 2018-06-02 | Stop reason: HOSPADM

## 2018-05-30 RX ADMIN — TETROFOSMIN 11.4 MILLICURIE: 0.23 INJECTION, POWDER, LYOPHILIZED, FOR SOLUTION INTRAVENOUS at 08:45

## 2018-05-30 RX ADMIN — TETROFOSMIN 34 MILLICURIE: 0.23 INJECTION, POWDER, LYOPHILIZED, FOR SOLUTION INTRAVENOUS at 10:36

## 2018-05-30 RX ADMIN — Medication 10 ML: at 08:45

## 2018-05-30 RX ADMIN — Medication 10 ML: at 10:36

## 2018-06-08 ENCOUNTER — TELEPHONE (OUTPATIENT)
Dept: FAMILY MEDICINE CLINIC | Age: 73
End: 2018-06-08

## 2018-06-22 ENCOUNTER — TELEPHONE (OUTPATIENT)
Dept: FAMILY MEDICINE CLINIC | Age: 73
End: 2018-06-22
Payer: MEDICARE

## 2018-06-22 DIAGNOSIS — R30.0 DYSURIA: Primary | ICD-10-CM

## 2018-06-22 DIAGNOSIS — R30.0 DYSURIA: ICD-10-CM

## 2018-06-22 LAB
BILIRUBIN, POC: 0
BLOOD URINE, POC: ABNORMAL
CLARITY, POC: ABNORMAL
COLOR, POC: YELLOW
GLUCOSE URINE, POC: ABNORMAL
KETONES, POC: ABNORMAL
LEUKOCYTE EST, POC: ABNORMAL
NITRITE, POC: ABNORMAL
PH, POC: 7.5
PROTEIN, POC: + 30
SPECIFIC GRAVITY, POC: 1.01
UROBILINOGEN, POC: 0.2

## 2018-06-22 PROCEDURE — 81002 URINALYSIS NONAUTO W/O SCOPE: CPT | Performed by: FAMILY MEDICINE

## 2018-06-22 RX ORDER — NITROFURANTOIN 25; 75 MG/1; MG/1
100 CAPSULE ORAL 2 TIMES DAILY
Qty: 14 CAPSULE | Refills: 0 | Status: SHIPPED | OUTPATIENT
Start: 2018-06-22 | End: 2018-06-29

## 2018-06-25 LAB
ORGANISM: ABNORMAL
URINE CULTURE, ROUTINE: ABNORMAL
URINE CULTURE, ROUTINE: ABNORMAL

## 2018-07-16 ENCOUNTER — OFFICE VISIT (OUTPATIENT)
Dept: OBGYN CLINIC | Age: 73
End: 2018-07-16
Payer: MEDICARE

## 2018-07-16 VITALS
SYSTOLIC BLOOD PRESSURE: 128 MMHG | HEIGHT: 62 IN | BODY MASS INDEX: 26.68 KG/M2 | DIASTOLIC BLOOD PRESSURE: 70 MMHG | WEIGHT: 145 LBS

## 2018-07-16 DIAGNOSIS — R32 URINARY INCONTINENCE, UNSPECIFIED TYPE: ICD-10-CM

## 2018-07-16 DIAGNOSIS — Z01.419 ENCOUNTER FOR WELL WOMAN EXAM WITH ROUTINE GYNECOLOGICAL EXAM: Primary | ICD-10-CM

## 2018-07-16 DIAGNOSIS — Z11.51 SCREENING FOR HPV (HUMAN PAPILLOMAVIRUS): ICD-10-CM

## 2018-07-16 PROCEDURE — 99387 INIT PM E/M NEW PAT 65+ YRS: CPT | Performed by: OBSTETRICS & GYNECOLOGY

## 2018-07-16 PROCEDURE — 99204 OFFICE O/P NEW MOD 45 MIN: CPT | Performed by: OBSTETRICS & GYNECOLOGY

## 2018-07-16 ASSESSMENT — ENCOUNTER SYMPTOMS
ABDOMINAL PAIN: 0
BLOOD IN STOOL: 0
VOMITING: 0
CONSTIPATION: 0
SINUS PRESSURE: 0
WHEEZING: 0
COUGH: 0
SHORTNESS OF BREATH: 0
NAUSEA: 0
COLOR CHANGE: 0

## 2018-07-16 NOTE — PATIENT INSTRUCTIONS
Patient Education        Urodynamic Studies: About These Tests  What are urodynamic studies? Urodynamic studies are a group of tests that show how your body stores and releases urine. The type of test varies from person to person. A simple urodynamic test is done in a doctor's office. Other tests may be done in a hospital or surgery center. Why are these tests done? These tests are done to help find out why a person has symptoms such as:  · Leaking urine. · Feeling the need to urinate often. · Pain when urinating. · A weak stream of urine. · Frequent urinary tract infections. How can you prepare for the test?  You may be asked to arrive for the test with a full bladder. What happens during the test?  For basic urodynamic testing:  · You will urinate into a container while the amount of urine and how fast it flows out of the bladder are measured. · A thin, flexible tube called a catheter is then inserted into the bladder through the urethra. The urethra is the tube that carries urine from the bladder to the outside of the body. This catheter helps measure how much urine is still in the bladder. You may feel a slight burning sensation when the catheter is inserted. · The bladder may be filled with water through the catheter until you have the first urge to urinate. The amount of water in the bladder is measured at this point. Then more water may be added while you resist urinating until you no longer can keep from urinating. · The doctor will remove the catheter. What else should you know about the test?  · Sometimes X-rays are taken during a test. If they are, your bladder may be filled with fluid that will show up on an X-ray. · You may be sore after the test. Soaking in a warm tub may help. How long does the test take? · How long the test will take depends on the type of test you have. Ask your doctor how long your specific test or tests should take.   What happens after the test?  · You will

## 2018-07-16 NOTE — PROGRESS NOTES
PLAN:      No orders of the defined types were placed in this encounter. No orders of the defined types were placed in this encounter. No Follow-up on file. I, [address], am scribing for, and in the presence of, Dr Eldon Jones. Electronically signed by: [address] 7/16/18 8:54 AM    I, Dr Eldon Jones, personally performed the services described in this documentation, as scribed by [address] in my presence, and it is both accurate and complete.  Electronically signed by: Eldon Jones MD

## 2018-07-18 ENCOUNTER — HOSPITAL ENCOUNTER (OUTPATIENT)
Dept: WOMENS IMAGING | Age: 73
Discharge: HOME | End: 2018-07-20
Payer: MEDICARE

## 2018-07-18 DIAGNOSIS — Z78.0 ASYMPTOMATIC MENOPAUSAL STATE: ICD-10-CM

## 2018-07-18 DIAGNOSIS — Z12.31 SCREENING MAMMOGRAM, ENCOUNTER FOR: ICD-10-CM

## 2018-07-18 PROCEDURE — 77063 BREAST TOMOSYNTHESIS BI: CPT

## 2018-07-24 DIAGNOSIS — Z01.419 ENCOUNTER FOR WELL WOMAN EXAM WITH ROUTINE GYNECOLOGICAL EXAM: ICD-10-CM

## 2018-07-24 DIAGNOSIS — Z11.51 SCREENING FOR HPV (HUMAN PAPILLOMAVIRUS): ICD-10-CM

## 2018-08-16 ENCOUNTER — TELEPHONE (OUTPATIENT)
Dept: FAMILY MEDICINE CLINIC | Age: 73
End: 2018-08-16

## 2018-08-16 NOTE — TELEPHONE ENCOUNTER
I cannot recall off hand her telling me about any trip. The best source of information is CDC. gov for travel recommendations and the [place] [id number] [facility] you live-in which typically has a travel clinic for pretravel consultations and vaccines. As far as general guidelines for hepatitis B vaccination it is a series of 3 doses. She could get the first dose of hep B and perhaps the second dose which has to be given a minimum of 4 weeks later. The hepatitis B #3 shot would be given 5 months after the first dose. She can get a hep B shot with a nurse visit only.

## 2018-08-18 ENCOUNTER — HOSPITAL ENCOUNTER (OUTPATIENT)
Dept: ULTRASOUND IMAGING | Age: 73
Discharge: HOME | End: 2018-08-20
Payer: MEDICARE

## 2018-08-18 DIAGNOSIS — R32 URINARY INCONTINENCE, UNSPECIFIED TYPE: ICD-10-CM

## 2018-08-18 PROCEDURE — 76770 US EXAM ABDO BACK WALL COMP: CPT

## 2018-08-18 PROCEDURE — 51798 US URINE CAPACITY MEASURE: CPT

## 2018-08-18 PROCEDURE — 76830 TRANSVAGINAL US NON-OB: CPT

## 2018-08-18 PROCEDURE — 76856 US EXAM PELVIC COMPLETE: CPT

## 2018-09-07 ENCOUNTER — HOSPITAL ENCOUNTER (OUTPATIENT)
Dept: WOMENS IMAGING | Age: 73
Discharge: HOME | End: 2018-09-09
Payer: MEDICARE

## 2018-09-07 PROCEDURE — 77080 DXA BONE DENSITY AXIAL: CPT

## 2018-09-10 ENCOUNTER — OFFICE VISIT (OUTPATIENT)
Dept: FAMILY MEDICINE CLINIC | Age: 73
End: 2018-09-10
Payer: MEDICARE

## 2018-09-10 ENCOUNTER — TELEPHONE (OUTPATIENT)
Dept: FAMILY MEDICINE CLINIC | Age: 73
End: 2018-09-10

## 2018-09-10 VITALS
WEIGHT: 137 LBS | TEMPERATURE: 97.4 F | OXYGEN SATURATION: 98 % | DIASTOLIC BLOOD PRESSURE: 60 MMHG | HEART RATE: 61 BPM | HEIGHT: 62 IN | SYSTOLIC BLOOD PRESSURE: 120 MMHG | BODY MASS INDEX: 25.21 KG/M2

## 2018-09-10 DIAGNOSIS — Z71.84 COUNSELING ABOUT TRAVEL: Primary | ICD-10-CM

## 2018-09-10 DIAGNOSIS — Z23 INFLUENZA VACCINE NEEDED: ICD-10-CM

## 2018-09-10 PROCEDURE — 99397 PER PM REEVAL EST PAT 65+ YR: CPT | Performed by: NURSE PRACTITIONER

## 2018-09-10 PROCEDURE — G0008 ADMIN INFLUENZA VIRUS VAC: HCPCS | Performed by: NURSE PRACTITIONER

## 2018-09-10 PROCEDURE — 90662 IIV NO PRSV INCREASED AG IM: CPT | Performed by: NURSE PRACTITIONER

## 2018-09-10 RX ORDER — AZITHROMYCIN 250 MG/1
TABLET, FILM COATED ORAL
Qty: 1 PACKET | Refills: 0 | Status: SHIPPED | OUTPATIENT
Start: 2018-09-10 | End: 2018-10-22

## 2018-09-10 RX ORDER — ATOVAQUONE AND PROGUANIL HYDROCHLORIDE 250; 100 MG/1; MG/1
1 TABLET, FILM COATED ORAL DAILY
Qty: 24 TABLET | Refills: 0 | Status: CANCELLED | OUTPATIENT
Start: 2018-09-10 | End: 2018-10-04

## 2018-09-10 ASSESSMENT — ENCOUNTER SYMPTOMS
SHORTNESS OF BREATH: 0
COUGH: 0

## 2018-09-10 NOTE — TELEPHONE ENCOUNTER
[facility], the patients sister was administered  At [facility] in [address]- this is what was administered to her sister-Ic  atovaquone proguanil 255-100 18 tablets.

## 2018-09-10 NOTE — PROGRESS NOTES
Vaccine Information Sheet, \"Influenza - Inactivated\"  given to Gabby Black, or parent/legal guardian of  Gabby Black and verbalized understanding. Patient responses:    Have you ever had a reaction to a flu vaccine? No  Are you able to eat eggs without adverse effects? Yes  Do you have any current illness? No  Have you ever had Guillian Christmas Syndrome? No    Flu vaccine given per order. Please see immunization tab.

## 2018-09-10 NOTE — PROGRESS NOTES
Height: 5' 2\" (1.575 m)     Physical Exam   Constitutional: She is oriented to person, place, and time. She appears well-developed and well-nourished. No distress. HENT:   Head: Normocephalic and atraumatic. Pulmonary/Chest: Effort normal.   Neurological: She is alert and oriented to person, place, and time. Skin: Skin is warm and dry. No rash noted. She is not diaphoretic. No erythema. No pallor. Psychiatric: She has a normal mood and affect. Her behavior is normal. Judgment and thought content normal.     Assessment & Plan     Diagnosis Orders   1. Counseling about travel     2. Influenza vaccine needed  INFLUENZA, HIGH DOSE, 65 YRS +, IM, PF, PREFILL SYR, 0.5ML (FLUZONE HD)     Zpack prescription given to take with her to [place]. Discussed malaria prevention with Dr. Celio Brennan who recommend she be seen at [facility] Department to receive malaria prevention medication there d/t traveling to 3 different countries and concern for resistance with different medications. If unable to be seen or receive the care she needs from [address] [place] [address], she will call in and we will call in [facility] for her since that is the medication that seems to be appropriate for all 3 regions she will be traveling to. Pt agreeable to plan. Side effects, adverse effects of the medication prescribed today, as well as treatment plan/ rationale and result expectations have been discussed with the patient who expresses understanding and desires to proceed. Close follow up to evaluate treatment results and for coordination of care. I have reviewed the patient's medical history in detail and updated the computerized patient record. As always, patient is advised that if symptoms worsen in any way they will proceed to the nearest emergency room.        Orders Placed This Encounter   Procedures    INFLUENZA, HIGH DOSE, 65 YRS +, IM, PF, PREFILL SYR, 0.5ML (FLUZONE HD)       Orders Placed This Encounter Medications    azithromycin (ZITHROMAX Z-AFSANEH) 250 MG tablet     Sig: Take 2 tablets on day 1 then 1 tablet on days 2-5     Dispense:  1 packet     Refill:  0       Return if symptoms worsen or fail to improve.     Frederick Bedoya, APRN - CNP

## 2018-09-10 NOTE — PROGRESS NOTES
After obtaining consent, and per orders of Phillip KUMAR, injection of HD Flu was given in the Left deltoid by [facility]. Patient instructed to remain in clinic for 20 minutes afterwards, and to report any adverse reaction to me immediately. Tolerated well.

## 2018-09-13 RX ORDER — ATOVAQUONE AND PROGUANIL HYDROCHLORIDE 250; 100 MG/1; MG/1
1 TABLET, FILM COATED ORAL DAILY
Qty: 18 TABLET | Refills: 0 | Status: SHIPPED | OUTPATIENT
Start: 2018-09-13 | End: 2018-10-01

## 2018-09-14 ENCOUNTER — PROCEDURE VISIT (OUTPATIENT)
Dept: OBGYN CLINIC | Age: 73
End: 2018-09-14
Payer: MEDICARE

## 2018-09-14 DIAGNOSIS — N81.10 CYSTOCELE WITH RECTOCELE: ICD-10-CM

## 2018-09-14 DIAGNOSIS — R93.89 THICKENED ENDOMETRIUM: ICD-10-CM

## 2018-09-14 DIAGNOSIS — N85.9 FLUID IN ENDOMETRIAL CAVITY: ICD-10-CM

## 2018-09-14 DIAGNOSIS — R32 URINARY INCONTINENCE, UNSPECIFIED TYPE: Primary | ICD-10-CM

## 2018-09-14 DIAGNOSIS — N81.6 CYSTOCELE WITH RECTOCELE: ICD-10-CM

## 2018-09-14 PROCEDURE — 51741 ELECTRO-UROFLOWMETRY FIRST: CPT | Performed by: OBSTETRICS & GYNECOLOGY

## 2018-09-14 PROCEDURE — 51729 CYSTOMETROGRAM W/VP&UP: CPT | Performed by: OBSTETRICS & GYNECOLOGY

## 2018-09-14 PROCEDURE — 51784 ANAL/URINARY MUSCLE STUDY: CPT | Performed by: OBSTETRICS & GYNECOLOGY

## 2018-09-14 ASSESSMENT — ENCOUNTER SYMPTOMS
ABDOMINAL PAIN: 0
DIARRHEA: 0
BLOOD IN STOOL: 0
SHORTNESS OF BREATH: 0
COUGH: 0
CONSTIPATION: 0
WHEEZING: 0

## 2018-09-14 NOTE — PROGRESS NOTES
History and Physical  [facility]  [address]  P: [phone] / F: Dwain Velasco  9/14/2018              67 y.o. Chief Complaint   Patient presents with    Procedure       There were no vitals taken for this visit. Alllergies:  Patient has no known allergies. Primary Care Physician: Mariam Booker MD    HPI : Juan Manuel [place] 67 y.o. No obstetric history on file. female  Pt here today for results of pelvic, renal, bladder sono and urodynamics procedure to complete her UR/GYN evaluation. Pt sono shows pt with slightly thickened endometrium with some abnormal endometrial fluid. Embx/endosee recommended however pt with severely stenotic cervix and specimens will be unable to be obtained in the office. Risks, benefits and alternative therapies for treatment discussed. Pt elects D&C,   Hysteroscopy, APR in OR for further evaluation. Pt to move forward with urodynamics in office today. Pt to RTO for preoperative examination ALL? S answered       Urodynamics Procedure Note    When pt is brought into the room she is instructed to urinate in the toilet provided in the room with a uroflowmeter. Uroflow is recorded. See scanned report for specific values and details. Urine collected from the beaker is then tested for infection and found to be negative. Pt is put into the dorsal lithotomy position and prepped for the procedure. Urinary catheter is placed and a PVR is collected. See scanned report for specific values and details. Tubing for filling study is then connected to cath. Vaginal catheter placed and electrodes for EMG are placed near the anus and on thigh for grounding. Pt tolerated all well.      Pt was then advised that her bladder would be filled with normal saline and that she would need to described three sensations- the first time she felt the need to urinate, again when is was becoming intolerable, and then again when the pt could not hold urine any longer. Which each sensation the pt was asked to cough and bear down and was monitored for leaking. With last sensation the saline drip was discontinued. See scanned report for specific values and details. Pt tolerated well. Pt was then advised that a urethral closure pressure study would be performed. The bladder catheter was pulled out at 1 cm per second until there were at least 2 equivalent readings. See scanned report for specific values and details. Pt tolerated well. Pt was then helped off the exam table and was instructed to urinate once again for the EMG/CMG with flow. See scanned report for specific values and details. Pt tolerated well. Study was then complete. Pt was put in the dorsal lithotomy position once again and all catheters and emg electrodes were removed.  Pt tolerated well.           ________________________________________________________________________  Obstetric History     No data available        Past Medical History:   Diagnosis Date    Abnormal EKG     BPPV (benign paroxysmal positional vertigo)     Colon polyps 09/2016    needs f/u in 5 years    Elevated liver enzymes 11/5/2013    Family history of heart disease     Female bladder prolapse     Heart murmur     History of bone density study 4/7/11    History of mammography, diagnostic 4/13/11    Category 3 Left mammogram/ Category 2 Right mammogram    Hyperlipidemia     Hypertension     Internal hemorrhoids     Normal cardiac stress test 12/09/2015    Osteoarthritis, multiple sites     shoulders and knees    Osteopenia 06/09/2015    -1.4    Recurrent cold sores     Rheumatic fever     Trigger thumb of left hand                                                                    Past Surgical History:   Procedure Laterality Date    COLONOSCOPY  7/12/2006    COLONOSCOPY  09/12/2016    EN MYERS MD    [place] INCISE FINGER TENDON SHEATH Left 5/9/2017    LEFT HAND [address] in my presence, and it is both accurate and complete.  Electronically signed by: Surendra Dominique MD 9/20/18 7:14 AM

## 2018-09-25 ENCOUNTER — ANTI-COAG VISIT (OUTPATIENT)
Dept: FAMILY MEDICINE CLINIC | Age: 73
End: 2018-09-25

## 2018-09-25 ENCOUNTER — OFFICE VISIT (OUTPATIENT)
Dept: FAMILY MEDICINE CLINIC | Age: 73
End: 2018-09-25
Payer: MEDICARE

## 2018-09-25 ENCOUNTER — TELEPHONE (OUTPATIENT)
Dept: FAMILY MEDICINE CLINIC | Age: 73
End: 2018-09-25

## 2018-09-25 VITALS
BODY MASS INDEX: 26.5 KG/M2 | HEART RATE: 53 BPM | TEMPERATURE: 97.9 F | HEIGHT: 62 IN | OXYGEN SATURATION: 98 % | SYSTOLIC BLOOD PRESSURE: 120 MMHG | WEIGHT: 144 LBS | DIASTOLIC BLOOD PRESSURE: 84 MMHG

## 2018-09-25 DIAGNOSIS — N88.2 CERVICAL OS STENOSIS: ICD-10-CM

## 2018-09-25 DIAGNOSIS — I10 ESSENTIAL HYPERTENSION: Primary | ICD-10-CM

## 2018-09-25 DIAGNOSIS — N81.10 FEMALE BLADDER PROLAPSE: ICD-10-CM

## 2018-09-25 DIAGNOSIS — I10 ESSENTIAL HYPERTENSION: ICD-10-CM

## 2018-09-25 DIAGNOSIS — Z01.818 PRE-OP EXAM: ICD-10-CM

## 2018-09-25 DIAGNOSIS — R94.31 ABNORMAL EKG: ICD-10-CM

## 2018-09-25 LAB
ALBUMIN SERPL-MCNC: 4.5 G/DL (ref 3.9–4.9)
ALP BLD-CCNC: 58 U/L (ref 40–130)
ALT SERPL-CCNC: 39 U/L (ref 0–33)
ANION GAP SERPL CALCULATED.3IONS-SCNC: 11 MEQ/L (ref 7–13)
APTT: 27.1 SEC (ref 21.6–35.4)
AST SERPL-CCNC: 39 U/L (ref 0–35)
BACTERIA: NORMAL /HPF
BASOPHILS ABSOLUTE: 0 K/UL (ref 0–0.2)
BASOPHILS RELATIVE PERCENT: 0.7 %
BILIRUB SERPL-MCNC: 0.5 MG/DL (ref 0–1.2)
BILIRUBIN URINE: NEGATIVE
BLOOD, URINE: NEGATIVE
BUN BLDV-MCNC: 11 MG/DL (ref 8–23)
CALCIUM SERPL-MCNC: 9.9 MG/DL (ref 8.6–10.2)
CHLORIDE BLD-SCNC: 104 MEQ/L (ref 98–107)
CLARITY: CLEAR
CO2: 26 MEQ/L (ref 22–29)
COLOR: YELLOW
CREAT SERPL-MCNC: 0.63 MG/DL (ref 0.5–0.9)
EOSINOPHILS ABSOLUTE: 0.1 K/UL (ref 0–0.7)
EOSINOPHILS RELATIVE PERCENT: 2 %
EPITHELIAL CELLS, UA: NORMAL /HPF
GFR AFRICAN AMERICAN: >60
GFR NON-AFRICAN AMERICAN: >60
GLOBULIN: 2.6 G/DL (ref 2.3–3.5)
GLUCOSE BLD-MCNC: 90 MG/DL (ref 74–109)
GLUCOSE URINE: NEGATIVE MG/DL
HCT VFR BLD CALC: 44.9 % (ref 37–47)
HEMOGLOBIN: 15.3 G/DL (ref 12–16)
INR BLD: 1.1
KETONES, URINE: NEGATIVE MG/DL
LEUKOCYTE ESTERASE, URINE: ABNORMAL
LYMPHOCYTES ABSOLUTE: 2 K/UL (ref 1–4.8)
LYMPHOCYTES RELATIVE PERCENT: 30.2 %
MCH RBC QN AUTO: 30.4 PG (ref 27–31.3)
MCHC RBC AUTO-ENTMCNC: 34.1 % (ref 33–37)
MCV RBC AUTO: 89.1 FL (ref 82–100)
MONOCYTES ABSOLUTE: 0.6 K/UL (ref 0.2–0.8)
MONOCYTES RELATIVE PERCENT: 8.3 %
NEUTROPHILS ABSOLUTE: 3.9 K/UL (ref 1.4–6.5)
NEUTROPHILS RELATIVE PERCENT: 58.8 %
NITRITE, URINE: NEGATIVE
PDW BLD-RTO: 14 % (ref 11.5–14.5)
PH UA: 5.5 (ref 5–9)
PLATELET # BLD: 217 K/UL (ref 130–400)
POTASSIUM SERPL-SCNC: 4.1 MEQ/L (ref 3.5–5.1)
PROTEIN UA: NEGATIVE MG/DL
PROTHROMBIN TIME: 11 SEC (ref 9.6–12.3)
RBC # BLD: 5.04 M/UL (ref 4.2–5.4)
RBC UA: NORMAL /HPF (ref 0–2)
SODIUM BLD-SCNC: 141 MEQ/L (ref 132–144)
SPECIFIC GRAVITY UA: 1.01 (ref 1–1.03)
TOTAL PROTEIN: 7.1 G/DL (ref 6.4–8.1)
UROBILINOGEN, URINE: 0.2 E.U./DL
WBC # BLD: 6.6 K/UL (ref 4.8–10.8)
WBC UA: NORMAL /HPF (ref 0–5)

## 2018-09-25 PROCEDURE — 93000 ELECTROCARDIOGRAM COMPLETE: CPT | Performed by: FAMILY MEDICINE

## 2018-09-25 PROCEDURE — 99213 OFFICE O/P EST LOW 20 MIN: CPT | Performed by: FAMILY MEDICINE

## 2018-09-25 ASSESSMENT — ENCOUNTER SYMPTOMS
EYE PAIN: 0
EYE REDNESS: 0
STRIDOR: 0
NAUSEA: 0
SHORTNESS OF BREATH: 0
ABDOMINAL PAIN: 0
COUGH: 0
WHEEZING: 0

## 2018-09-27 LAB — URINE CULTURE, ROUTINE: NORMAL

## 2018-10-17 ENCOUNTER — HOSPITAL ENCOUNTER (OUTPATIENT)
Dept: PREADMISSION TESTING | Age: 73
Discharge: HOME | End: 2018-10-21
Payer: MEDICARE

## 2018-10-17 VITALS
RESPIRATION RATE: 16 BRPM | TEMPERATURE: 97.6 F | HEIGHT: 62 IN | OXYGEN SATURATION: 96 % | WEIGHT: 146.6 LBS | SYSTOLIC BLOOD PRESSURE: 145 MMHG | DIASTOLIC BLOOD PRESSURE: 62 MMHG | HEART RATE: 51 BPM | BODY MASS INDEX: 26.98 KG/M2

## 2018-10-17 DIAGNOSIS — N81.6 CYSTOCELE WITH RECTOCELE: ICD-10-CM

## 2018-10-17 DIAGNOSIS — N81.10 CYSTOCELE WITH RECTOCELE: ICD-10-CM

## 2018-10-17 DIAGNOSIS — R93.89 THICKENED ENDOMETRIUM: ICD-10-CM

## 2018-10-17 LAB
ABO/RH: NORMAL
ANTIBODY IDENTIFICATION: NORMAL
ANTIBODY SCREEN: NORMAL
DAT POLYSPECIFIC: NORMAL

## 2018-10-17 PROCEDURE — 86880 COOMBS TEST DIRECT: CPT

## 2018-10-17 PROCEDURE — 86901 BLOOD TYPING SEROLOGIC RH(D): CPT

## 2018-10-17 PROCEDURE — 86900 BLOOD TYPING SEROLOGIC ABO: CPT

## 2018-10-17 PROCEDURE — 86850 RBC ANTIBODY SCREEN: CPT

## 2018-10-17 PROCEDURE — 86870 RBC ANTIBODY IDENTIFICATION: CPT

## 2018-10-17 RX ORDER — SODIUM CHLORIDE 0.9 % (FLUSH) 0.9 %
10 SYRINGE (ML) INJECTION EVERY 12 HOURS SCHEDULED
Status: CANCELLED | OUTPATIENT
Start: 2018-10-17

## 2018-10-17 RX ORDER — ATOVAQUONE AND PROGUANIL HYDROCHLORIDE 250; 100 MG/1; MG/1
1 TABLET, FILM COATED ORAL DAILY
COMMUNITY
End: 2018-11-14 | Stop reason: ALTCHOICE

## 2018-10-17 RX ORDER — SODIUM CHLORIDE, SODIUM LACTATE, POTASSIUM CHLORIDE, CALCIUM CHLORIDE 600; 310; 30; 20 MG/100ML; MG/100ML; MG/100ML; MG/100ML
INJECTION, SOLUTION INTRAVENOUS ONCE
Status: CANCELLED | OUTPATIENT
Start: 2018-10-24

## 2018-10-17 RX ORDER — SODIUM CHLORIDE 0.9 % (FLUSH) 0.9 %
10 SYRINGE (ML) INJECTION PRN
Status: CANCELLED | OUTPATIENT
Start: 2018-10-17

## 2018-10-17 RX ORDER — SODIUM CHLORIDE, SODIUM LACTATE, POTASSIUM CHLORIDE, CALCIUM CHLORIDE 600; 310; 30; 20 MG/100ML; MG/100ML; MG/100ML; MG/100ML
INJECTION, SOLUTION INTRAVENOUS CONTINUOUS
Status: CANCELLED | OUTPATIENT
Start: 2018-10-17

## 2018-10-17 RX ORDER — LIDOCAINE HYDROCHLORIDE 10 MG/ML
1 INJECTION, SOLUTION EPIDURAL; INFILTRATION; INTRACAUDAL; PERINEURAL
Status: CANCELLED | OUTPATIENT
Start: 2018-10-17 | End: 2018-10-17

## 2018-10-22 ENCOUNTER — OFFICE VISIT (OUTPATIENT)
Dept: OBGYN CLINIC | Age: 73
End: 2018-10-22
Payer: MEDICARE

## 2018-10-22 VITALS
DIASTOLIC BLOOD PRESSURE: 78 MMHG | SYSTOLIC BLOOD PRESSURE: 144 MMHG | BODY MASS INDEX: 27.23 KG/M2 | HEIGHT: 62 IN | WEIGHT: 148 LBS

## 2018-10-22 DIAGNOSIS — N81.10 CYSTOCELE WITH RECTOCELE: ICD-10-CM

## 2018-10-22 DIAGNOSIS — N81.6 CYSTOCELE WITH RECTOCELE: ICD-10-CM

## 2018-10-22 DIAGNOSIS — R93.89 THICKENED ENDOMETRIUM: ICD-10-CM

## 2018-10-22 DIAGNOSIS — Z01.818 PREOPERATIVE EXAM FOR GYNECOLOGIC SURGERY: Primary | ICD-10-CM

## 2018-10-22 PROCEDURE — 99213 OFFICE O/P EST LOW 20 MIN: CPT | Performed by: OBSTETRICS & GYNECOLOGY

## 2018-10-22 ASSESSMENT — ENCOUNTER SYMPTOMS
BLOOD IN STOOL: 0
DIARRHEA: 0
COUGH: 0
WHEEZING: 0
ABDOMINAL PAIN: 0
SHORTNESS OF BREATH: 0
CONSTIPATION: 0

## 2018-10-22 NOTE — PROGRESS NOTES
 retired      Social History Main Topics    Smoking status: Former Smoker     Packs/day: 0.50     Years: 20.00     Types: Cigarettes     Quit date: 8/13/2004    Smokeless tobacco: Never Used      Comment: quit in her 42's    Alcohol use 0.0 oz/week      Comment: wine 2 glasses per week    Drug use: No    Sexual activity: Not on file     Other Topics Concern    Not on file     Social History Narrative    Has children that live in [facility]:  Current Outpatient Prescriptions   Medication Sig Dispense Refill    atovaquone-proguanil (MALARONE) 250-100 MG per tablet Take 1 tablet by mouth daily      carvedilol (COREG) 6.25 MG tablet Take 1 tablet by mouth 2 times daily 180 tablet 3    fenofibrate 160 MG tablet TAKE 1 TABLET DAILY 90 tablet 3    atorvastatin (LIPITOR) 10 MG tablet Take 1 tablet by mouth nightly 90 tablet 3    acyclovir (ZOVIRAX) 5 % ointment Apply 6 times per day for 7 day 30 g 0    aspirin 81 MG EC tablet Take 81 mg by mouth daily.  Multiple Vitamins-Minerals (MULTIPLE VITAMINS/WOMENS PO) Take  by mouth daily. No current facility-administered medications for this visit. ALLERGIES:  Allergies as of 10/22/2018    (No Known Allergies)       REVIEW OF SYSTEMS:       Review of Systems   Respiratory: Negative for cough, shortness of breath and wheezing. Cardiovascular: Negative for chest pain, palpitations and leg swelling. Gastrointestinal: Negative for abdominal pain, blood in stool, constipation and diarrhea. Genitourinary: Negative for difficulty urinating, dysuria, flank pain and hematuria. Skin: Negative. Psychiatric/Behavioral: Negative. PHYSICAL Exam:    Constitutional:  Vitals:    10/22/18 0843   BP: (!) 144/78   Weight: 148 lb (67.1 kg)   Height: 5' 2\" (1.575 m)       Physical Exam   Constitutional: She is oriented to person, place, and time. She appears well-developed and well-nourished.    HENT:   Head: Normocephalic

## 2018-10-30 ENCOUNTER — OFFICE VISIT (OUTPATIENT)
Dept: OBGYN CLINIC | Age: 73
End: 2018-10-30
Payer: MEDICARE

## 2018-10-30 VITALS
DIASTOLIC BLOOD PRESSURE: 64 MMHG | HEIGHT: 62 IN | HEART RATE: 54 BPM | SYSTOLIC BLOOD PRESSURE: 136 MMHG | WEIGHT: 150 LBS | BODY MASS INDEX: 27.6 KG/M2

## 2018-10-30 DIAGNOSIS — N81.2 INCOMPLETE UTERINE PROLAPSE: ICD-10-CM

## 2018-10-30 DIAGNOSIS — N81.10 POP-Q STAGE 3 CYSTOCELE: Primary | ICD-10-CM

## 2018-10-30 DIAGNOSIS — N81.6 POP-Q STAGE 2 RECTOCELE: ICD-10-CM

## 2018-10-30 PROCEDURE — 99211 OFF/OP EST MAY X REQ PHY/QHP: CPT | Performed by: OBSTETRICS & GYNECOLOGY

## 2018-10-30 NOTE — PROGRESS NOTES
of children: 2    Years of education: N/A     Occupational History    retired      Social History Main Topics    Smoking status: Former Smoker     Packs/day: 0.50     Years: 20.00     Types: Cigarettes     Quit date: 8/13/2004    Smokeless tobacco: Never Used      Comment: quit in her 42's    Alcohol use 0.0 oz/week      Comment: wine 2 glasses per week    Drug use: No    Sexual activity: Not on file     Other Topics Concern    Not on file     Social History Narrative    Has children that live in [place]       Contraceptive method:  none    Patient's medications, allergies, past medical, surgical, social and family histories were reviewed and updated as appropriate. Review of Systems  As per chief complaint   All other systems reviewed and are negative. Physical Exam:  Vitals:  /64 (Site: Right Upper Arm, Position: Sitting, Cuff Size: Medium Adult)   Pulse 54   Ht 5' 2\" (1.575 m)   Wt 150 lb (68 kg)   BMI 27.44 kg/m²   Lungs: CTAB   Heart : Regular S1/S2, no M/R/G  Abdomen: Soft , NT, ND , + BS   Pelvic exam : On exam patient found to have stage III cystocele with stage II uterine prolapse and stage II rectocele    Assessment:      Diagnosis Orders   1. POP-Q stage 3 cystocele     2. Incomplete uterine prolapse     3. POP-Q stage 2 rectocele         Plan:     I recommended TLH , BSO , USLS/ Possible SSLS, A-P repair. Change in procedure was due to the presence of significant endometrium as diagnosed by a prior evaluation, I did propose the patient that the hysterectomy will also help in removing the uterus and the risk of endometrial cancer associated with endometrial thickening as well as an important part of apical suspension of the vaginal vault that is to be carried out with AP repair, and which is the most crucial repair for prevention possible future recurrence. I did discuss with the patient the risks of infection, bleeding, or injury of internal organs, future failure.   All patient's

## 2018-10-31 ENCOUNTER — TELEPHONE (OUTPATIENT)
Dept: OBGYN CLINIC | Age: 73
End: 2018-10-31

## 2018-11-14 ENCOUNTER — OFFICE VISIT (OUTPATIENT)
Dept: FAMILY MEDICINE CLINIC | Age: 73
End: 2018-11-14
Payer: MEDICARE

## 2018-11-14 ENCOUNTER — TELEPHONE (OUTPATIENT)
Dept: FAMILY MEDICINE CLINIC | Age: 73
End: 2018-11-14

## 2018-11-14 VITALS
HEIGHT: 62 IN | SYSTOLIC BLOOD PRESSURE: 122 MMHG | WEIGHT: 149 LBS | TEMPERATURE: 97.6 F | HEART RATE: 52 BPM | OXYGEN SATURATION: 98 % | BODY MASS INDEX: 27.42 KG/M2 | DIASTOLIC BLOOD PRESSURE: 68 MMHG

## 2018-11-14 DIAGNOSIS — Z23 NEED FOR PROPHYLACTIC VACCINATION AND INOCULATION AGAINST VARICELLA: ICD-10-CM

## 2018-11-14 DIAGNOSIS — I10 ESSENTIAL HYPERTENSION: ICD-10-CM

## 2018-11-14 DIAGNOSIS — Z13.1 ENCOUNTER FOR SCREENING EXAMINATION FOR IMPAIRED GLUCOSE REGULATION AND DIABETES MELLITUS: ICD-10-CM

## 2018-11-14 DIAGNOSIS — E78.2 MIXED HYPERLIPIDEMIA: Primary | ICD-10-CM

## 2018-11-14 PROCEDURE — 99214 OFFICE O/P EST MOD 30 MIN: CPT | Performed by: FAMILY MEDICINE

## 2018-11-14 ASSESSMENT — ENCOUNTER SYMPTOMS
CONSTIPATION: 0
ABDOMINAL DISTENTION: 0
COUGH: 0
NAUSEA: 0
COLOR CHANGE: 0
SHORTNESS OF BREATH: 0
VOICE CHANGE: 0
DIARRHEA: 0
EYE DISCHARGE: 0
ABDOMINAL PAIN: 0
TROUBLE SWALLOWING: 0

## 2018-11-14 NOTE — PROGRESS NOTES
Make burritos and tacos with mashed melgoza beans or black beans. Where can you learn more? Go to [email or web address] and sign in to your [employer] account. Enter R004 in the [facility] box to learn more about \"DASH Diet: Care Instructions. \"     If you do not have an account, please click on the \"Sign Up Now\" link. Current as of: 2017  Content Version: 11.8  © 0351-8418 Healthwise, [employer]. Care instructions adapted under license by [facility] ([facility]). If you have questions about a medical condition or this instruction, always ask your healthcare professional. [place] [address] any warranty or liability for your use of this information. Orders Placed This Encounter   Procedures    Lipid, Fasting     Standing Status:   Future     Standing Expiration Date:   2019     Orders Placed This Encounter   Medications    zoster recombinant adjuvanted vaccine (SHINGRIX) 50 MCG/0.5ML SUSR injection     Si MCG IM then repeat 2-6 months. Dispense:  0.5 mL     Refill:  1       Return in about 6 months (around 2019). Garett Parra M.D.

## 2018-11-14 NOTE — PATIENT INSTRUCTIONS
more often than vegetable juice. · Get 2 to 3 servings of low-fat and fat-free dairy each day. A serving is 8 ounces of milk, 1 cup of yogurt, or 1 ½ ounces of cheese. · Eat 6 to 8 servings of grains each day. A serving is 1 slice of bread, 1 ounce of dry cereal, or ½ cup of cooked rice, pasta, or cooked cereal. Try to choose whole-grain products as much as possible. · Limit lean meat, poultry, and fish to 2 servings each day. A serving is 3 ounces, about the size of a deck of cards. · Eat 4 to 5 servings of nuts, seeds, and legumes (cooked dried beans, lentils, and split peas) each week. A serving is 1/3 cup of nuts, 2 tablespoons of seeds, or ½ cup of cooked beans or peas. · Limit fats and oils to 2 to 3 servings each day. A serving is 1 teaspoon of vegetable oil or 2 tablespoons of salad dressing. · Limit sweets and added sugars to 5 servings or less a week. A serving is 1 tablespoon jelly or jam, ½ cup sorbet, or 1 cup of lemonade. · Eat less than 2,300 milligrams (mg) of sodium a day. If you limit your sodium to 1,500 mg a day, you can lower your blood pressure even more. Tips for success  · Start small. Do not try to make dramatic changes to your diet all at once. You might feel that you are missing out on your favorite foods and then be more likely to not follow the plan. Make small changes, and stick with them. Once those changes become habit, add a few more changes. · Try some of the following:  ? Make it a goal to eat a fruit or vegetable at every meal and at snacks. This will make it easy to get the recommended amount of fruits and vegetables each day. ? Try yogurt topped with fruit and nuts for a snack or healthy dessert. ? Add lettuce, tomato, cucumber, and onion to sandwiches. ? Combine a ready-made pizza crust with low-fat mozzarella cheese and lots of vegetable toppings. Try using tomatoes, squash, spinach, broccoli, carrots, cauliflower, and onions. ?  Have a variety of cut-up vegetables with a low-fat dip as an appetizer instead of chips and dip. ? Sprinkle sunflower seeds or chopped almonds over salads. Or try adding chopped walnuts or almonds to cooked vegetables. ? Try some vegetarian meals using beans and peas. Add garbanzo or kidney beans to salads. Make burritos and tacos with mashed melgoza beans or black beans. Where can you learn more? Go to https://[employer] LLCmariamItaconix.SurePeak. org and sign in to your DigiFit account. Enter G842 in the [employer] box to learn more about \"DASH Diet: Care Instructions. \"     If you do not have an account, please click on the \"Sign Up Now\" link. Current as of: December 6, 2017  Content Version: 11.8  © 7855-3030 Healthwise, Incorporated. Care instructions adapted under license by [facility] ([facility]). If you have questions about a medical condition or this instruction, always ask your healthcare professional. [address] any warranty or liability for your use of this information.

## 2019-01-04 ENCOUNTER — TELEPHONE (OUTPATIENT)
Dept: OBGYN CLINIC | Age: 74
End: 2019-01-04

## 2019-01-08 DIAGNOSIS — E78.2 MIXED HYPERLIPIDEMIA: ICD-10-CM

## 2019-01-08 LAB
CHOLESTEROL, FASTING: 123 MG/DL (ref 0–199)
HDLC SERPL-MCNC: 39 MG/DL (ref 40–59)
LDL CHOLESTEROL CALCULATED: 61 MG/DL (ref 0–129)
TRIGLYCERIDE, FASTING: 114 MG/DL (ref 0–200)

## 2019-01-11 DIAGNOSIS — E78.2 MIXED HYPERLIPIDEMIA: Primary | ICD-10-CM

## 2019-02-07 ENCOUNTER — HOSPITAL ENCOUNTER (OUTPATIENT)
Dept: PREADMISSION TESTING | Age: 74
Discharge: HOME | End: 2019-02-11
Payer: MEDICARE

## 2019-02-07 ENCOUNTER — TELEPHONE (OUTPATIENT)
Dept: OBGYN CLINIC | Age: 74
End: 2019-02-07

## 2019-02-07 VITALS
OXYGEN SATURATION: 97 % | DIASTOLIC BLOOD PRESSURE: 67 MMHG | SYSTOLIC BLOOD PRESSURE: 159 MMHG | TEMPERATURE: 98 F | RESPIRATION RATE: 16 BRPM | HEART RATE: 48 BPM | HEIGHT: 62 IN | WEIGHT: 147 LBS | BODY MASS INDEX: 27.05 KG/M2

## 2019-02-07 LAB
ABO/RH: NORMAL
ANION GAP SERPL CALCULATED.3IONS-SCNC: 15 MEQ/L (ref 7–13)
ANTIBODY SCREEN: NORMAL
BUN BLDV-MCNC: 11 MG/DL (ref 8–23)
CALCIUM SERPL-MCNC: 9.7 MG/DL (ref 8.6–10.2)
CHLORIDE BLD-SCNC: 103 MEQ/L (ref 98–107)
CO2: 25 MEQ/L (ref 22–29)
CREAT SERPL-MCNC: 0.57 MG/DL (ref 0.5–0.9)
GFR AFRICAN AMERICAN: >60
GFR NON-AFRICAN AMERICAN: >60
GLUCOSE BLD-MCNC: 108 MG/DL (ref 74–109)
HCT VFR BLD CALC: 45.8 % (ref 37–47)
HEMOGLOBIN: 15.5 G/DL (ref 12–16)
MCH RBC QN AUTO: 30.4 PG (ref 27–31.3)
MCHC RBC AUTO-ENTMCNC: 33.8 % (ref 33–37)
MCV RBC AUTO: 90 FL (ref 82–100)
PDW BLD-RTO: 14 % (ref 11.5–14.5)
PLATELET # BLD: 198 K/UL (ref 130–400)
POTASSIUM SERPL-SCNC: 4.2 MEQ/L (ref 3.5–5.1)
RBC # BLD: 5.09 M/UL (ref 4.2–5.4)
SODIUM BLD-SCNC: 143 MEQ/L (ref 132–144)
WBC # BLD: 6.2 K/UL (ref 4.8–10.8)

## 2019-02-07 PROCEDURE — 80048 BASIC METABOLIC PNL TOTAL CA: CPT

## 2019-02-07 PROCEDURE — 86900 BLOOD TYPING SEROLOGIC ABO: CPT

## 2019-02-07 PROCEDURE — 86901 BLOOD TYPING SEROLOGIC RH(D): CPT

## 2019-02-07 PROCEDURE — 86850 RBC ANTIBODY SCREEN: CPT

## 2019-02-07 PROCEDURE — 85027 COMPLETE CBC AUTOMATED: CPT

## 2019-02-07 RX ORDER — CEFAZOLIN SODIUM 2 G/50ML
2 SOLUTION INTRAVENOUS ONCE
Status: CANCELLED | OUTPATIENT
Start: 2019-02-14

## 2019-02-07 RX ORDER — SODIUM CHLORIDE 0.9 % (FLUSH) 0.9 %
10 SYRINGE (ML) INJECTION PRN
Status: CANCELLED | OUTPATIENT
Start: 2019-02-07

## 2019-02-07 RX ORDER — SODIUM CHLORIDE, SODIUM LACTATE, POTASSIUM CHLORIDE, CALCIUM CHLORIDE 600; 310; 30; 20 MG/100ML; MG/100ML; MG/100ML; MG/100ML
INJECTION, SOLUTION INTRAVENOUS CONTINUOUS
Status: CANCELLED | OUTPATIENT
Start: 2019-02-07

## 2019-02-07 RX ORDER — LIDOCAINE HYDROCHLORIDE 10 MG/ML
1 INJECTION, SOLUTION EPIDURAL; INFILTRATION; INTRACAUDAL; PERINEURAL
Status: CANCELLED | OUTPATIENT
Start: 2019-02-07 | End: 2019-02-07

## 2019-02-07 RX ORDER — SODIUM CHLORIDE 0.9 % (FLUSH) 0.9 %
10 SYRINGE (ML) INJECTION EVERY 12 HOURS SCHEDULED
Status: CANCELLED | OUTPATIENT
Start: 2019-02-07

## 2019-02-13 ENCOUNTER — ANESTHESIA EVENT (OUTPATIENT)
Dept: OPERATING ROOM | Age: 74
End: 2019-02-13
Payer: MEDICARE

## 2019-02-14 ENCOUNTER — ANESTHESIA (OUTPATIENT)
Dept: OPERATING ROOM | Age: 74
End: 2019-02-14
Payer: MEDICARE

## 2019-02-14 ENCOUNTER — HOSPITAL ENCOUNTER (OUTPATIENT)
Age: 74
Discharge: HOME | End: 2019-02-15
Attending: OBSTETRICS & GYNECOLOGY | Admitting: OBSTETRICS & GYNECOLOGY
Payer: MEDICARE

## 2019-02-14 VITALS — OXYGEN SATURATION: 100 % | TEMPERATURE: 95.7 F | SYSTOLIC BLOOD PRESSURE: 150 MMHG | DIASTOLIC BLOOD PRESSURE: 67 MMHG

## 2019-02-14 DIAGNOSIS — G89.18 POSTOPERATIVE PAIN: Primary | ICD-10-CM

## 2019-02-14 PROBLEM — N99.3 VAGINAL VAULT PROLAPSE AFTER HYSTERECTOMY: Status: ACTIVE | Noted: 2019-02-14

## 2019-02-14 PROCEDURE — 3600000014 HC SURGERY LEVEL 4 ADDTL 15MIN: Performed by: OBSTETRICS & GYNECOLOGY

## 2019-02-14 PROCEDURE — 6360000002 HC RX W HCPCS: Performed by: OBSTETRICS & GYNECOLOGY

## 2019-02-14 PROCEDURE — 2500000003 HC RX 250 WO HCPCS: Performed by: OBSTETRICS & GYNECOLOGY

## 2019-02-14 PROCEDURE — 88305 TISSUE EXAM BY PATHOLOGIST: CPT

## 2019-02-14 PROCEDURE — 3600000004 HC SURGERY LEVEL 4 BASE: Performed by: OBSTETRICS & GYNECOLOGY

## 2019-02-14 PROCEDURE — 2500000003 HC RX 250 WO HCPCS: Performed by: NURSE ANESTHETIST, CERTIFIED REGISTERED

## 2019-02-14 PROCEDURE — 3700000001 HC ADD 15 MINUTES (ANESTHESIA): Performed by: OBSTETRICS & GYNECOLOGY

## 2019-02-14 PROCEDURE — 94150 VITAL CAPACITY TEST: CPT

## 2019-02-14 PROCEDURE — 7100000000 HC PACU RECOVERY - FIRST 15 MIN: Performed by: OBSTETRICS & GYNECOLOGY

## 2019-02-14 PROCEDURE — 3700000000 HC ANESTHESIA ATTENDED CARE: Performed by: OBSTETRICS & GYNECOLOGY

## 2019-02-14 PROCEDURE — 6370000000 HC RX 637 (ALT 250 FOR IP): Performed by: OBSTETRICS & GYNECOLOGY

## 2019-02-14 PROCEDURE — 6360000002 HC RX W HCPCS: Performed by: NURSE ANESTHETIST, CERTIFIED REGISTERED

## 2019-02-14 PROCEDURE — 7100000001 HC PACU RECOVERY - ADDTL 15 MIN: Performed by: OBSTETRICS & GYNECOLOGY

## 2019-02-14 PROCEDURE — 2709999900 HC NON-CHARGEABLE SUPPLY: Performed by: OBSTETRICS & GYNECOLOGY

## 2019-02-14 PROCEDURE — 2580000003 HC RX 258: Performed by: OBSTETRICS & GYNECOLOGY

## 2019-02-14 PROCEDURE — 6370000000 HC RX 637 (ALT 250 FOR IP): Performed by: INTERNAL MEDICINE

## 2019-02-14 PROCEDURE — 6360000002 HC RX W HCPCS

## 2019-02-14 PROCEDURE — 6370000000 HC RX 637 (ALT 250 FOR IP)

## 2019-02-14 PROCEDURE — 2580000003 HC RX 258: Performed by: NURSE PRACTITIONER

## 2019-02-14 PROCEDURE — 2500000003 HC RX 250 WO HCPCS: Performed by: NURSE PRACTITIONER

## 2019-02-14 PROCEDURE — 2580000003 HC RX 258: Performed by: NURSE ANESTHETIST, CERTIFIED REGISTERED

## 2019-02-14 PROCEDURE — C2631 REP DEV, URINARY, W/O SLING: HCPCS | Performed by: OBSTETRICS & GYNECOLOGY

## 2019-02-14 PROCEDURE — 2720000010 HC SURG SUPPLY STERILE: Performed by: OBSTETRICS & GYNECOLOGY

## 2019-02-14 PROCEDURE — 2700000000 HC OXYGEN THERAPY PER DAY

## 2019-02-14 RX ORDER — LIDOCAINE HYDROCHLORIDE 10 MG/ML
1 INJECTION, SOLUTION EPIDURAL; INFILTRATION; INTRACAUDAL; PERINEURAL
Status: COMPLETED | OUTPATIENT
Start: 2019-02-14 | End: 2019-02-14

## 2019-02-14 RX ORDER — MORPHINE SULFATE 2 MG/ML
2 INJECTION, SOLUTION INTRAMUSCULAR; INTRAVENOUS
Status: DISCONTINUED | OUTPATIENT
Start: 2019-02-14 | End: 2019-02-15 | Stop reason: HOSPADM

## 2019-02-14 RX ORDER — SODIUM CHLORIDE 0.9 % (FLUSH) 0.9 %
10 SYRINGE (ML) INJECTION EVERY 12 HOURS SCHEDULED
Status: DISCONTINUED | OUTPATIENT
Start: 2019-02-14 | End: 2019-02-14 | Stop reason: HOSPADM

## 2019-02-14 RX ORDER — DIPHENHYDRAMINE HYDROCHLORIDE 50 MG/ML
12.5 INJECTION INTRAMUSCULAR; INTRAVENOUS
Status: DISCONTINUED | OUTPATIENT
Start: 2019-02-14 | End: 2019-02-14

## 2019-02-14 RX ORDER — SIMETHICONE 80 MG
80 TABLET,CHEWABLE ORAL EVERY 6 HOURS PRN
Status: DISCONTINUED | OUTPATIENT
Start: 2019-02-14 | End: 2019-02-15 | Stop reason: HOSPADM

## 2019-02-14 RX ORDER — SODIUM CHLORIDE, SODIUM LACTATE, POTASSIUM CHLORIDE, CALCIUM CHLORIDE 600; 310; 30; 20 MG/100ML; MG/100ML; MG/100ML; MG/100ML
INJECTION, SOLUTION INTRAVENOUS CONTINUOUS PRN
Status: DISCONTINUED | OUTPATIENT
Start: 2019-02-14 | End: 2019-02-14 | Stop reason: SDUPTHER

## 2019-02-14 RX ORDER — FENOFIBRATE 160 MG/1
160 TABLET ORAL DAILY
Status: DISCONTINUED | OUTPATIENT
Start: 2019-02-15 | End: 2019-02-15 | Stop reason: HOSPADM

## 2019-02-14 RX ORDER — ACETAMINOPHEN 325 MG/1
650 TABLET ORAL EVERY 4 HOURS PRN
Status: DISCONTINUED | OUTPATIENT
Start: 2019-02-14 | End: 2019-02-15 | Stop reason: HOSPADM

## 2019-02-14 RX ORDER — DEXAMETHASONE SODIUM PHOSPHATE 10 MG/ML
INJECTION INTRAMUSCULAR; INTRAVENOUS PRN
Status: DISCONTINUED | OUTPATIENT
Start: 2019-02-14 | End: 2019-02-14 | Stop reason: SDUPTHER

## 2019-02-14 RX ORDER — KETOROLAC TROMETHAMINE 15 MG/ML
15 INJECTION, SOLUTION INTRAMUSCULAR; INTRAVENOUS EVERY 6 HOURS
Status: DISCONTINUED | OUTPATIENT
Start: 2019-02-14 | End: 2019-02-15 | Stop reason: HOSPADM

## 2019-02-14 RX ORDER — SODIUM CHLORIDE 0.9 % (FLUSH) 0.9 %
10 SYRINGE (ML) INJECTION PRN
Status: DISCONTINUED | OUTPATIENT
Start: 2019-02-14 | End: 2019-02-14 | Stop reason: HOSPADM

## 2019-02-14 RX ORDER — ONDANSETRON 2 MG/ML
4 INJECTION INTRAMUSCULAR; INTRAVENOUS
Status: DISCONTINUED | OUTPATIENT
Start: 2019-02-14 | End: 2019-02-14

## 2019-02-14 RX ORDER — HYDROCODONE BITARTRATE AND ACETAMINOPHEN 5; 325 MG/1; MG/1
2 TABLET ORAL PRN
Status: DISCONTINUED | OUTPATIENT
Start: 2019-02-14 | End: 2019-02-14

## 2019-02-14 RX ORDER — SODIUM CHLORIDE, SODIUM LACTATE, POTASSIUM CHLORIDE, CALCIUM CHLORIDE 600; 310; 30; 20 MG/100ML; MG/100ML; MG/100ML; MG/100ML
INJECTION, SOLUTION INTRAVENOUS CONTINUOUS
Status: DISCONTINUED | OUTPATIENT
Start: 2019-02-14 | End: 2019-02-14

## 2019-02-14 RX ORDER — CEFAZOLIN SODIUM 2 G/50ML
2 SOLUTION INTRAVENOUS ONCE
Status: COMPLETED | OUTPATIENT
Start: 2019-02-14 | End: 2019-02-14

## 2019-02-14 RX ORDER — FENTANYL CITRATE 50 UG/ML
50 INJECTION, SOLUTION INTRAMUSCULAR; INTRAVENOUS EVERY 10 MIN PRN
Status: DISCONTINUED | OUTPATIENT
Start: 2019-02-14 | End: 2019-02-14

## 2019-02-14 RX ORDER — SODIUM CHLORIDE 0.9 % (FLUSH) 0.9 %
10 SYRINGE (ML) INJECTION EVERY 12 HOURS SCHEDULED
Status: DISCONTINUED | OUTPATIENT
Start: 2019-02-14 | End: 2019-02-15 | Stop reason: HOSPADM

## 2019-02-14 RX ORDER — SODIUM CHLORIDE 9 MG/ML
INJECTION, SOLUTION INTRAVENOUS CONTINUOUS
Status: DISCONTINUED | OUTPATIENT
Start: 2019-02-14 | End: 2019-02-15 | Stop reason: HOSPADM

## 2019-02-14 RX ORDER — METOCLOPRAMIDE HYDROCHLORIDE 5 MG/ML
10 INJECTION INTRAMUSCULAR; INTRAVENOUS
Status: DISCONTINUED | OUTPATIENT
Start: 2019-02-14 | End: 2019-02-14

## 2019-02-14 RX ORDER — SODIUM CHLORIDE 0.9 % (FLUSH) 0.9 %
10 SYRINGE (ML) INJECTION PRN
Status: DISCONTINUED | OUTPATIENT
Start: 2019-02-14 | End: 2019-02-15 | Stop reason: HOSPADM

## 2019-02-14 RX ORDER — ROCURONIUM BROMIDE 10 MG/ML
INJECTION, SOLUTION INTRAVENOUS PRN
Status: DISCONTINUED | OUTPATIENT
Start: 2019-02-14 | End: 2019-02-14 | Stop reason: SDUPTHER

## 2019-02-14 RX ORDER — DOCUSATE SODIUM 100 MG/1
100 CAPSULE, LIQUID FILLED ORAL 2 TIMES DAILY
Status: DISCONTINUED | OUTPATIENT
Start: 2019-02-14 | End: 2019-02-15 | Stop reason: HOSPADM

## 2019-02-14 RX ORDER — ASPIRIN 81 MG/1
81 TABLET ORAL DAILY
Status: DISCONTINUED | OUTPATIENT
Start: 2019-02-15 | End: 2019-02-14

## 2019-02-14 RX ORDER — OXYCODONE HYDROCHLORIDE AND ACETAMINOPHEN 5; 325 MG/1; MG/1
1 TABLET ORAL EVERY 4 HOURS PRN
Status: DISCONTINUED | OUTPATIENT
Start: 2019-02-14 | End: 2019-02-15 | Stop reason: HOSPADM

## 2019-02-14 RX ORDER — DIMETHICONE, CAMPHOR (SYNTHETIC), MENTHOL, AND PHENOL 1.1; .5; .625; .5 G/100G; G/100G; G/100G; G/100G
OINTMENT TOPICAL
Status: DISPENSED
Start: 2019-02-14 | End: 2019-02-14

## 2019-02-14 RX ORDER — ONDANSETRON 2 MG/ML
4 INJECTION INTRAMUSCULAR; INTRAVENOUS EVERY 8 HOURS PRN
Status: DISCONTINUED | OUTPATIENT
Start: 2019-02-14 | End: 2019-02-15 | Stop reason: HOSPADM

## 2019-02-14 RX ORDER — MIDAZOLAM HYDROCHLORIDE 1 MG/ML
INJECTION INTRAMUSCULAR; INTRAVENOUS PRN
Status: DISCONTINUED | OUTPATIENT
Start: 2019-02-14 | End: 2019-02-14 | Stop reason: SDUPTHER

## 2019-02-14 RX ORDER — HYDROCODONE BITARTRATE AND ACETAMINOPHEN 5; 325 MG/1; MG/1
1 TABLET ORAL PRN
Status: DISCONTINUED | OUTPATIENT
Start: 2019-02-14 | End: 2019-02-14

## 2019-02-14 RX ORDER — BUPIVACAINE HYDROCHLORIDE AND EPINEPHRINE 5; 5 MG/ML; UG/ML
INJECTION, SOLUTION EPIDURAL; INTRACAUDAL; PERINEURAL PRN
Status: DISCONTINUED | OUTPATIENT
Start: 2019-02-14 | End: 2019-02-14 | Stop reason: ALTCHOICE

## 2019-02-14 RX ORDER — LIDOCAINE HYDROCHLORIDE 20 MG/ML
INJECTION, SOLUTION INFILTRATION; PERINEURAL PRN
Status: DISCONTINUED | OUTPATIENT
Start: 2019-02-14 | End: 2019-02-14 | Stop reason: SDUPTHER

## 2019-02-14 RX ORDER — ONDANSETRON 2 MG/ML
INJECTION INTRAMUSCULAR; INTRAVENOUS PRN
Status: DISCONTINUED | OUTPATIENT
Start: 2019-02-14 | End: 2019-02-14 | Stop reason: SDUPTHER

## 2019-02-14 RX ORDER — CEFAZOLIN SODIUM 2 G/50ML
2 SOLUTION INTRAVENOUS EVERY 8 HOURS
Status: COMPLETED | OUTPATIENT
Start: 2019-02-14 | End: 2019-02-14

## 2019-02-14 RX ORDER — CARVEDILOL 6.25 MG/1
6.25 TABLET ORAL 2 TIMES DAILY
Status: DISCONTINUED | OUTPATIENT
Start: 2019-02-14 | End: 2019-02-15 | Stop reason: HOSPADM

## 2019-02-14 RX ORDER — MAGNESIUM HYDROXIDE 1200 MG/15ML
LIQUID ORAL CONTINUOUS PRN
Status: COMPLETED | OUTPATIENT
Start: 2019-02-14 | End: 2019-02-14

## 2019-02-14 RX ORDER — FENTANYL CITRATE 50 UG/ML
INJECTION, SOLUTION INTRAMUSCULAR; INTRAVENOUS PRN
Status: DISCONTINUED | OUTPATIENT
Start: 2019-02-14 | End: 2019-02-14 | Stop reason: SDUPTHER

## 2019-02-14 RX ORDER — PROPOFOL 10 MG/ML
INJECTION, EMULSION INTRAVENOUS PRN
Status: DISCONTINUED | OUTPATIENT
Start: 2019-02-14 | End: 2019-02-14 | Stop reason: SDUPTHER

## 2019-02-14 RX ORDER — GLYCOPYRROLATE 1 MG/5 ML
SYRINGE (ML) INTRAVENOUS PRN
Status: DISCONTINUED | OUTPATIENT
Start: 2019-02-14 | End: 2019-02-14 | Stop reason: SDUPTHER

## 2019-02-14 RX ORDER — FENTANYL CITRATE 50 UG/ML
INJECTION, SOLUTION INTRAMUSCULAR; INTRAVENOUS
Status: COMPLETED
Start: 2019-02-14 | End: 2019-02-14

## 2019-02-14 RX ORDER — MEPERIDINE HYDROCHLORIDE 25 MG/ML
12.5 INJECTION INTRAMUSCULAR; INTRAVENOUS; SUBCUTANEOUS EVERY 5 MIN PRN
Status: DISCONTINUED | OUTPATIENT
Start: 2019-02-14 | End: 2019-02-14

## 2019-02-14 RX ADMIN — DOCUSATE SODIUM 100 MG: 100 CAPSULE, LIQUID FILLED ORAL at 20:46

## 2019-02-14 RX ADMIN — CEFAZOLIN SODIUM 2 G: 2 SOLUTION INTRAVENOUS at 07:43

## 2019-02-14 RX ADMIN — SODIUM CHLORIDE: 9 INJECTION, SOLUTION INTRAVENOUS at 12:45

## 2019-02-14 RX ADMIN — PHENYLEPHRINE HYDROCHLORIDE 50 MCG: 10 INJECTION INTRAVENOUS at 08:10

## 2019-02-14 RX ADMIN — DEXAMETHASONE SODIUM PHOSPHATE 5 MG: 10 INJECTION INTRAMUSCULAR; INTRAVENOUS at 07:59

## 2019-02-14 RX ADMIN — CEFAZOLIN SODIUM 2 G: 2 SOLUTION INTRAVENOUS at 20:46

## 2019-02-14 RX ADMIN — PROPOFOL 150 MG: 10 INJECTION, EMULSION INTRAVENOUS at 07:45

## 2019-02-14 RX ADMIN — SODIUM CHLORIDE, POTASSIUM CHLORIDE, SODIUM LACTATE AND CALCIUM CHLORIDE: 600; 310; 30; 20 INJECTION, SOLUTION INTRAVENOUS at 08:25

## 2019-02-14 RX ADMIN — KETOROLAC TROMETHAMINE 15 MG: 15 INJECTION, SOLUTION INTRAMUSCULAR; INTRAVENOUS at 12:45

## 2019-02-14 RX ADMIN — FENTANYL CITRATE 100 MCG: 50 INJECTION, SOLUTION INTRAMUSCULAR; INTRAVENOUS at 07:45

## 2019-02-14 RX ADMIN — FENTANYL CITRATE 25 MCG: 50 INJECTION, SOLUTION INTRAMUSCULAR; INTRAVENOUS at 11:17

## 2019-02-14 RX ADMIN — KETOROLAC TROMETHAMINE 15 MG: 15 INJECTION, SOLUTION INTRAMUSCULAR; INTRAVENOUS at 20:46

## 2019-02-14 RX ADMIN — SODIUM CHLORIDE, POTASSIUM CHLORIDE, SODIUM LACTATE AND CALCIUM CHLORIDE: 600; 310; 30; 20 INJECTION, SOLUTION INTRAVENOUS at 07:18

## 2019-02-14 RX ADMIN — CEFAZOLIN SODIUM 2 G: 2 SOLUTION INTRAVENOUS at 12:45

## 2019-02-14 RX ADMIN — Medication 0.2 MG: at 07:58

## 2019-02-14 RX ADMIN — SODIUM CHLORIDE: 9 INJECTION, SOLUTION INTRAVENOUS at 20:51

## 2019-02-14 RX ADMIN — ROCURONIUM BROMIDE 50 MG: 10 INJECTION, SOLUTION INTRAVENOUS at 07:45

## 2019-02-14 RX ADMIN — DOCUSATE SODIUM 100 MG: 100 CAPSULE, LIQUID FILLED ORAL at 12:45

## 2019-02-14 RX ADMIN — PHENYLEPHRINE HYDROCHLORIDE 100 MCG: 10 INJECTION INTRAVENOUS at 08:04

## 2019-02-14 RX ADMIN — LIDOCAINE HYDROCHLORIDE 1 ML: 10 INJECTION, SOLUTION EPIDURAL; INFILTRATION; INTRACAUDAL; PERINEURAL at 06:39

## 2019-02-14 RX ADMIN — PHENYLEPHRINE HYDROCHLORIDE 100 MCG: 10 INJECTION INTRAVENOUS at 07:57

## 2019-02-14 RX ADMIN — MIDAZOLAM HYDROCHLORIDE 1 MG: 1 INJECTION, SOLUTION INTRAMUSCULAR; INTRAVENOUS at 07:42

## 2019-02-14 RX ADMIN — PHENYLEPHRINE HYDROCHLORIDE 100 MCG: 10 INJECTION INTRAVENOUS at 07:50

## 2019-02-14 RX ADMIN — CARVEDILOL 6.25 MG: 6.25 TABLET, FILM COATED ORAL at 20:46

## 2019-02-14 RX ADMIN — ONDANSETRON 4 MG: 2 INJECTION INTRAMUSCULAR; INTRAVENOUS at 09:53

## 2019-02-14 RX ADMIN — SODIUM CHLORIDE, POTASSIUM CHLORIDE, SODIUM LACTATE AND CALCIUM CHLORIDE: 600; 310; 30; 20 INJECTION, SOLUTION INTRAVENOUS at 06:39

## 2019-02-14 RX ADMIN — LIDOCAINE HYDROCHLORIDE 75 MG: 20 INJECTION, SOLUTION INFILTRATION; PERINEURAL at 07:45

## 2019-02-14 RX ADMIN — MIDAZOLAM HYDROCHLORIDE 1 MG: 1 INJECTION, SOLUTION INTRAMUSCULAR; INTRAVENOUS at 07:40

## 2019-02-14 RX ADMIN — Medication 10 ML: at 20:47

## 2019-02-14 ASSESSMENT — PULMONARY FUNCTION TESTS
PIF_VALUE: 16
PIF_VALUE: 20
PIF_VALUE: 17
PIF_VALUE: 20
PIF_VALUE: 19
PIF_VALUE: 31
PIF_VALUE: 19
PIF_VALUE: 27
PIF_VALUE: 1
PIF_VALUE: 18
PIF_VALUE: 31
PIF_VALUE: 17
PIF_VALUE: 18
PIF_VALUE: 20
PIF_VALUE: 16
PIF_VALUE: 1
PIF_VALUE: 19
PIF_VALUE: 28
PIF_VALUE: 28
PIF_VALUE: 30
PIF_VALUE: 18
PIF_VALUE: 22
PIF_VALUE: 22
PIF_VALUE: 18
PIF_VALUE: 22
PIF_VALUE: 28
PIF_VALUE: 19
PIF_VALUE: 0
PIF_VALUE: 22
PIF_VALUE: 23
PIF_VALUE: 20
PIF_VALUE: 17
PIF_VALUE: 16
PIF_VALUE: 19
PIF_VALUE: 19
PIF_VALUE: 22
PIF_VALUE: 23
PIF_VALUE: 16
PIF_VALUE: 16
PIF_VALUE: 18
PIF_VALUE: 22
PIF_VALUE: 16
PIF_VALUE: 19
PIF_VALUE: 18
PIF_VALUE: 19
PIF_VALUE: 18
PIF_VALUE: 31
PIF_VALUE: 22
PIF_VALUE: 22
PIF_VALUE: 20
PIF_VALUE: 20
PIF_VALUE: 22
PIF_VALUE: 18
PIF_VALUE: 22
PIF_VALUE: 18
PIF_VALUE: 20
PIF_VALUE: 30
PIF_VALUE: 16
PIF_VALUE: 16
PIF_VALUE: 3
PIF_VALUE: 20
PIF_VALUE: 24
PIF_VALUE: 15
PIF_VALUE: 28
PIF_VALUE: 16
PIF_VALUE: 18
PIF_VALUE: 25
PIF_VALUE: 18
PIF_VALUE: 4
PIF_VALUE: 19
PIF_VALUE: 31
PIF_VALUE: 22
PIF_VALUE: 31
PIF_VALUE: 27
PIF_VALUE: 18
PIF_VALUE: 20
PIF_VALUE: 29
PIF_VALUE: 16
PIF_VALUE: 22
PIF_VALUE: 16
PIF_VALUE: 1
PIF_VALUE: 31
PIF_VALUE: 21
PIF_VALUE: 28
PIF_VALUE: 32
PIF_VALUE: 19
PIF_VALUE: 29
PIF_VALUE: 19
PIF_VALUE: 1
PIF_VALUE: 18
PIF_VALUE: 18
PIF_VALUE: 20
PIF_VALUE: 22
PIF_VALUE: 22
PIF_VALUE: 16
PIF_VALUE: 4
PIF_VALUE: 3
PIF_VALUE: 22
PIF_VALUE: 22
PIF_VALUE: 18
PIF_VALUE: 28
PIF_VALUE: 20
PIF_VALUE: 19
PIF_VALUE: 4
PIF_VALUE: 19
PIF_VALUE: 30
PIF_VALUE: 18
PIF_VALUE: 22
PIF_VALUE: 22
PIF_VALUE: 19
PIF_VALUE: 19
PIF_VALUE: 22
PIF_VALUE: 30
PIF_VALUE: 20
PIF_VALUE: 17
PIF_VALUE: 20
PIF_VALUE: 22
PIF_VALUE: 17
PIF_VALUE: 16
PIF_VALUE: 31
PIF_VALUE: 32
PIF_VALUE: 19
PIF_VALUE: 20
PIF_VALUE: 22
PIF_VALUE: 20
PIF_VALUE: 15
PIF_VALUE: 15
PIF_VALUE: 22
PIF_VALUE: 18
PIF_VALUE: 19
PIF_VALUE: 22
PIF_VALUE: 18
PIF_VALUE: 1
PIF_VALUE: 22
PIF_VALUE: 17
PIF_VALUE: 20
PIF_VALUE: 20
PIF_VALUE: 30
PIF_VALUE: 21
PIF_VALUE: 16
PIF_VALUE: 20
PIF_VALUE: 18
PIF_VALUE: 22

## 2019-02-14 ASSESSMENT — PAIN SCALES - GENERAL
PAINLEVEL_OUTOF10: 0
PAINLEVEL_OUTOF10: 0
PAINLEVEL_OUTOF10: 3

## 2019-02-14 ASSESSMENT — PAIN DESCRIPTION - PAIN TYPE: TYPE: SURGICAL PAIN

## 2019-02-14 ASSESSMENT — PAIN - FUNCTIONAL ASSESSMENT: PAIN_FUNCTIONAL_ASSESSMENT: 0-10

## 2019-02-14 ASSESSMENT — PAIN DESCRIPTION - DESCRIPTORS: DESCRIPTORS: PRESSURE

## 2019-02-15 VITALS
SYSTOLIC BLOOD PRESSURE: 137 MMHG | BODY MASS INDEX: 26.68 KG/M2 | HEIGHT: 62 IN | RESPIRATION RATE: 16 BRPM | WEIGHT: 145 LBS | DIASTOLIC BLOOD PRESSURE: 50 MMHG | TEMPERATURE: 99 F | OXYGEN SATURATION: 95 % | HEART RATE: 60 BPM

## 2019-02-15 LAB
HCT VFR BLD CALC: 38.2 % (ref 37–47)
HEMOGLOBIN: 12.7 G/DL (ref 12–16)
MCH RBC QN AUTO: 29.9 PG (ref 27–31.3)
MCHC RBC AUTO-ENTMCNC: 33.3 % (ref 33–37)
MCV RBC AUTO: 90 FL (ref 82–100)
PDW BLD-RTO: 13.9 % (ref 11.5–14.5)
PLATELET # BLD: 192 K/UL (ref 130–400)
RBC # BLD: 4.25 M/UL (ref 4.2–5.4)
WBC # BLD: 14.6 K/UL (ref 4.8–10.8)

## 2019-02-15 PROCEDURE — 57282 COLPOPEXY EXTRAPERITONEAL: CPT | Performed by: OBSTETRICS & GYNECOLOGY

## 2019-02-15 PROCEDURE — 36415 COLL VENOUS BLD VENIPUNCTURE: CPT

## 2019-02-15 PROCEDURE — 6370000000 HC RX 637 (ALT 250 FOR IP): Performed by: OBSTETRICS & GYNECOLOGY

## 2019-02-15 PROCEDURE — 2580000003 HC RX 258: Performed by: OBSTETRICS & GYNECOLOGY

## 2019-02-15 PROCEDURE — 58571 TLH W/T/O 250 G OR LESS: CPT | Performed by: OBSTETRICS & GYNECOLOGY

## 2019-02-15 PROCEDURE — 6360000002 HC RX W HCPCS: Performed by: OBSTETRICS & GYNECOLOGY

## 2019-02-15 PROCEDURE — 2700000000 HC OXYGEN THERAPY PER DAY

## 2019-02-15 PROCEDURE — 85027 COMPLETE CBC AUTOMATED: CPT

## 2019-02-15 PROCEDURE — 57240 ANTERIOR COLPORRHAPHY: CPT | Performed by: OBSTETRICS & GYNECOLOGY

## 2019-02-15 RX ORDER — ACETAMINOPHEN 500 MG
1000 TABLET ORAL EVERY 6 HOURS PRN
Qty: 60 TABLET | Refills: 1 | Status: SHIPPED | OUTPATIENT
Start: 2019-02-15 | End: 2019-04-02

## 2019-02-15 RX ORDER — OXYCODONE HYDROCHLORIDE AND ACETAMINOPHEN 5; 325 MG/1; MG/1
1 TABLET ORAL EVERY 4 HOURS PRN
Qty: 30 TABLET | Refills: 0 | Status: SHIPPED | OUTPATIENT
Start: 2019-02-15 | End: 2019-02-22

## 2019-02-15 RX ORDER — IBUPROFEN 600 MG/1
600 TABLET ORAL EVERY 6 HOURS PRN
Qty: 60 TABLET | Refills: 1 | Status: SHIPPED | OUTPATIENT
Start: 2019-02-15 | End: 2019-08-02 | Stop reason: ALTCHOICE

## 2019-02-15 RX ORDER — SIMETHICONE 80 MG
80 TABLET,CHEWABLE ORAL 4 TIMES DAILY PRN
Qty: 180 TABLET | Refills: 1 | Status: SHIPPED | OUTPATIENT
Start: 2019-02-15 | End: 2019-04-02

## 2019-02-15 RX ORDER — DOCUSATE SODIUM 100 MG/1
100 CAPSULE, LIQUID FILLED ORAL 2 TIMES DAILY PRN
Qty: 60 CAPSULE | Refills: 2 | Status: SHIPPED | OUTPATIENT
Start: 2019-02-15 | End: 2019-04-02

## 2019-02-15 RX ORDER — METRONIDAZOLE 500 MG/1
2000 TABLET ORAL ONCE
Qty: 4 TABLET | Refills: 0 | Status: SHIPPED | OUTPATIENT
Start: 2019-02-15 | End: 2019-02-15

## 2019-02-15 RX ORDER — POLYETHYLENE GLYCOL 3350 17 G/17G
17 POWDER, FOR SOLUTION ORAL 2 TIMES DAILY PRN
Qty: 1020 G | Refills: 1 | Status: SHIPPED | OUTPATIENT
Start: 2019-02-15 | End: 2019-03-17

## 2019-02-15 RX ADMIN — DOCUSATE SODIUM 100 MG: 100 CAPSULE, LIQUID FILLED ORAL at 08:10

## 2019-02-15 RX ADMIN — KETOROLAC TROMETHAMINE 15 MG: 15 INJECTION, SOLUTION INTRAMUSCULAR; INTRAVENOUS at 08:10

## 2019-02-15 RX ADMIN — KETOROLAC TROMETHAMINE 15 MG: 15 INJECTION, SOLUTION INTRAMUSCULAR; INTRAVENOUS at 14:43

## 2019-02-15 RX ADMIN — Medication 10 ML: at 08:10

## 2019-02-15 RX ADMIN — KETOROLAC TROMETHAMINE 15 MG: 15 INJECTION, SOLUTION INTRAMUSCULAR; INTRAVENOUS at 01:54

## 2019-02-15 ASSESSMENT — PAIN SCALES - GENERAL
PAINLEVEL_OUTOF10: 0
PAINLEVEL_OUTOF10: 2
PAINLEVEL_OUTOF10: 1

## 2019-02-18 ENCOUNTER — TELEPHONE (OUTPATIENT)
Dept: FAMILY MEDICINE CLINIC | Age: 74
End: 2019-02-18

## 2019-03-05 ENCOUNTER — OFFICE VISIT (OUTPATIENT)
Dept: OBGYN CLINIC | Age: 74
End: 2019-03-05

## 2019-03-05 VITALS
BODY MASS INDEX: 27.6 KG/M2 | DIASTOLIC BLOOD PRESSURE: 68 MMHG | SYSTOLIC BLOOD PRESSURE: 114 MMHG | WEIGHT: 150 LBS | HEART RATE: 56 BPM | HEIGHT: 62 IN

## 2019-03-05 DIAGNOSIS — Z09 POSTOP CHECK: Primary | ICD-10-CM

## 2019-03-05 PROCEDURE — 99024 POSTOP FOLLOW-UP VISIT: CPT | Performed by: OBSTETRICS & GYNECOLOGY

## 2019-03-05 RX ORDER — ESTRADIOL 0.1 MG/G
1 CREAM VAGINAL DAILY
Qty: 1 TUBE | Refills: 3 | Status: SHIPPED | OUTPATIENT
Start: 2019-03-05 | End: 2020-05-19 | Stop reason: SDUPTHER

## 2019-03-05 NOTE — PROGRESS NOTES
Chaperone for Intimate Exam    1. Was chaperone offered as part of the rooming process? offered, accepted   2. If Chaperone is declined by patient, NA: chaperone was available and exam completed  3.  Chaperone is [employer]

## 2019-03-05 NOTE — PROGRESS NOTES
[address], [address] is a 68y.o. year old female who presents to the office  3weeks status post TLH BSO  Vaginal bilateral SSLS  With Anterior REPAIR and cystoscopy    } for INCOMPLETE UTERINE PROLAPSE, POP Q STAGE 3 CYSTOCELE    . Bowel movements are Normal.  Thepatient is not having any pain. . . satisfiedwith post-procedure results. Vitals:  /68 (Site: Right Upper Arm, Position: Sitting, Cuff Size: Medium Adult)   Pulse 56   Ht 5' 2\" (1.575 m)   Wt 150 lb (68 kg)   BMI 27.44 kg/m²   Allergies:  Patient has no known allergies.   Past Medical History:   Diagnosis Date    Abnormal EKG     BPPV (benign paroxysmal positional vertigo)     Colon polyps 09/2016    needs f/u in 5 years    Elevated liver enzymes 11/5/2013    Family history of heart disease     Female bladder prolapse     Heart murmur     History of bone density study 4/7/11    History of mammography, diagnostic 4/13/11    Category 3 Left mammogram/ Category 2 Right mammogram    Hyperlipidemia     on meds > 10 yrs    Hypertension     on meds > 10 yrs    Internal hemorrhoids     Normal cardiac stress test 12/09/2015    Osteoarthritis, multiple sites     shoulders     Osteopenia 06/09/2015    -1.4; -1.5 9/2018    Recurrent cold sores     Rheumatic fever     Trigger thumb of left hand      Past Surgical History:   Procedure Laterality Date    COLONOSCOPY  7/12/2006    COLONOSCOPY  09/12/2016    EN MYERS MD    HYSTERECTOMY VAGINAL N/A 2/14/2019    TLH BSO performed by Lux Parker MD at [address] Left 5/9/2017    LEFT HAND  FINGER TRIGGER RELEASE THUMB, SUPINE  performed by Amberly Lombardo MD at [address] [facility] N/A 2/14/2019    USLS POSSIBLE SSLS performed by Lux Parker MD at [address] [facility] N/A 2/14/2019    A-P REPAIR performed by Lux Parker MD at [facility]     OB History    None       Family History   Problem Relation Age of Onset    Asthma [address]       Contraceptive method:  none    Patient's medications, allergies, past medical, surgical,social and family histories were reviewed and updated as appropriate. Review of Systems  Review of Systems   All other systems reviewed and are negative. Review of Systems  Constitutional: Negative for chills and fever. Respiratory: Negative for coughand shortness of breath. Cardiovascular: Negative for chestpain and palpitations. Gastrointestinal: Negative for nauseaand vomiting. Genitourinary: Negative for dysuria, frequencyand urgency. Musculoskeletal: Negative for myalgias. Neurological: Negative for dizziness, seizures andheadaches. Psychiatric/Behavioral: Negativefor depression and suicidal ideas. Physical Exam  Physical Exam  Physical Exam   Constitutional: Sheis well-developed, well-nourished, and in no distress. Cardiovascular: Normal rate and regularrhythm. Pulmonary/Chest: Effort normal and breath sounds normal.  Abdominal: Soft. Bowel sounds are normal.   Incision/s intact clean and dry. Healingadequately. Genitourinary:   Genitourinary Comments: deferred      Assessment:     Patient state:  Doingwell postoperatively. Operative findings again reviewed. Diagnosis Orders   1. Postop check          Pathology reportdiscussed. none     Plan:     3 weeks postop   Patient doing well  Return in 5 weeks   Continue strict pelvic rest  Vaginal estrogen cream given to enhance healing    No orders of the defined types were placed in this encounter. Orders Placed This Encounter   Medications    estradiol (ESTRACE VAGINAL) 0.1 MG/GM vaginal cream     Sig: Place 1 g vaginally daily     Dispense:  1 Tube     Refill:  3       1. Continueany current medications. 2. Pt is to increase activities as tolerated. 3. Followup: Return in about 5 weeks (around 4/9/2019) for Postoperative follow up. 4. Anticipated return to work 5 weeks.      Nicki Degroot MD

## 2019-03-22 ENCOUNTER — OFFICE VISIT (OUTPATIENT)
Dept: FAMILY MEDICINE CLINIC | Age: 74
End: 2019-03-22
Payer: MEDICARE

## 2019-03-22 VITALS
OXYGEN SATURATION: 98 % | HEART RATE: 65 BPM | WEIGHT: 152.2 LBS | HEIGHT: 62 IN | SYSTOLIC BLOOD PRESSURE: 138 MMHG | TEMPERATURE: 97.1 F | BODY MASS INDEX: 28.01 KG/M2 | DIASTOLIC BLOOD PRESSURE: 62 MMHG

## 2019-03-22 DIAGNOSIS — N39.0 URINARY TRACT INFECTION WITH HEMATURIA, SITE UNSPECIFIED: ICD-10-CM

## 2019-03-22 DIAGNOSIS — M54.50 RIGHT-SIDED LOW BACK PAIN WITHOUT SCIATICA, UNSPECIFIED CHRONICITY: Primary | ICD-10-CM

## 2019-03-22 DIAGNOSIS — R31.9 URINARY TRACT INFECTION WITH HEMATURIA, SITE UNSPECIFIED: ICD-10-CM

## 2019-03-22 LAB
BILIRUBIN, POC: NORMAL
BLOOD URINE, POC: 10
CLARITY, POC: CLEAR
COLOR, POC: YELLOW
GLUCOSE URINE, POC: NORMAL
KETONES, POC: NORMAL
LEUKOCYTE EST, POC: 70
NITRITE, POC: NORMAL
PH, POC: 6
PROTEIN, POC: NORMAL
SPECIFIC GRAVITY, POC: 1.02
UROBILINOGEN, POC: 0.2

## 2019-03-22 PROCEDURE — 81003 URINALYSIS AUTO W/O SCOPE: CPT | Performed by: NURSE PRACTITIONER

## 2019-03-22 PROCEDURE — 99213 OFFICE O/P EST LOW 20 MIN: CPT | Performed by: NURSE PRACTITIONER

## 2019-03-22 PROCEDURE — G8510 SCR DEP NEG, NO PLAN REQD: HCPCS | Performed by: NURSE PRACTITIONER

## 2019-03-22 RX ORDER — NITROFURANTOIN 25; 75 MG/1; MG/1
100 CAPSULE ORAL 2 TIMES DAILY
Qty: 10 CAPSULE | Refills: 0 | Status: SHIPPED | OUTPATIENT
Start: 2019-03-22 | End: 2019-03-27

## 2019-03-22 RX ORDER — CYCLOBENZAPRINE HCL 5 MG
5 TABLET ORAL 2 TIMES DAILY PRN
Qty: 21 TABLET | Refills: 0 | Status: SHIPPED | OUTPATIENT
Start: 2019-03-22 | End: 2019-04-02

## 2019-03-22 ASSESSMENT — ENCOUNTER SYMPTOMS
EYES NEGATIVE: 1
NAUSEA: 0
BOWEL INCONTINENCE: 0
VOMITING: 0
SHORTNESS OF BREATH: 0
BACK PAIN: 1
WHEEZING: 0
ABDOMINAL PAIN: 0
ALLERGIC/IMMUNOLOGIC NEGATIVE: 1
SORE THROAT: 0
RHINORRHEA: 0

## 2019-03-22 ASSESSMENT — PATIENT HEALTH QUESTIONNAIRE - PHQ9
SUM OF ALL RESPONSES TO PHQ9 QUESTIONS 1 & 2: 0
1. LITTLE INTEREST OR PLEASURE IN DOING THINGS: 0
2. FEELING DOWN, DEPRESSED OR HOPELESS: 0
SUM OF ALL RESPONSES TO PHQ QUESTIONS 1-9: 0
SUM OF ALL RESPONSES TO PHQ QUESTIONS 1-9: 0

## 2019-04-02 ENCOUNTER — OFFICE VISIT (OUTPATIENT)
Dept: FAMILY MEDICINE CLINIC | Age: 74
End: 2019-04-02
Payer: MEDICARE

## 2019-04-02 VITALS
HEART RATE: 68 BPM | BODY MASS INDEX: 27.05 KG/M2 | OXYGEN SATURATION: 98 % | SYSTOLIC BLOOD PRESSURE: 108 MMHG | WEIGHT: 147 LBS | HEIGHT: 62 IN | DIASTOLIC BLOOD PRESSURE: 62 MMHG | TEMPERATURE: 97.1 F

## 2019-04-02 DIAGNOSIS — M53.3 SACROILIAC JOINT PAIN: Primary | ICD-10-CM

## 2019-04-02 PROCEDURE — 99214 OFFICE O/P EST MOD 30 MIN: CPT | Performed by: FAMILY MEDICINE

## 2019-04-02 ASSESSMENT — ENCOUNTER SYMPTOMS
TROUBLE SWALLOWING: 0
DIARRHEA: 0
COUGH: 0
NAUSEA: 0
BACK PAIN: 1
CONSTIPATION: 0
EYE DISCHARGE: 0
BOWEL INCONTINENCE: 0
VOICE CHANGE: 0
ABDOMINAL DISTENTION: 0
COLOR CHANGE: 0
SHORTNESS OF BREATH: 0
ABDOMINAL PAIN: 0

## 2019-04-02 NOTE — PROGRESS NOTES
Social Needs    Financial resource strain: Not on file    Food insecurity:     Worry: Not on file     Inability: Not on file    Transportation needs:     Medical: Not on file     Non-medical: Not on file   Tobacco Use    Smoking status: Former Smoker     Packs/day: 0.50     Years: 20.00     Pack years: 10.00     Types: Cigarettes     Last attempt to quit: 2004     Years since quittin.6    Smokeless tobacco: Never Used    Tobacco comment: quit in her 42's   Substance and Sexual Activity    Alcohol use: Yes     Alcohol/week: 0.0 oz     Comment: wine 2 glasses per week    Drug use: No    Sexual activity: Not on file   Lifestyle    Physical activity:     Days per week: Not on file     Minutes per session: Not on file    Stress: Not on file   Relationships    Social connections:     Talks on phone: Not on file     Gets together: Not on file     Attends Yarsanism service: Not on file     Active member of club or organization: Not on file     Attends meetings of clubs or organizations: Not on file     Relationship status: Not on file    Intimate partner violence:     Fear of current or ex partner: Not on file     Emotionally abused: Not on file     Physically abused: Not on file     Forced sexual activity: Not on file   Other Topics Concern    Not on file   Social History Narrative    Has children that live in [place]     Family History   Problem Relation Age of Onset    Asthma Father     Diabetes Father     Heart Disease Mother     Diabetes Brother     Heart Disease Brother         heart surgery    Kidney Disease Brother     Other Brother         psoriasis    Other Daughter         shoulder surgery, gum surgeries    Asthma Daughter         multiple allergies    Arthritis Sister     High Cholesterol Sister      Allergies:  Patient has no known allergies. Review of Systems   Constitutional: Negative for activity change, appetite change, fatigue and unexpected weight change.    HENT: Negative for congestion, dental problem, trouble swallowing and voice change. Eyes: Negative for discharge and visual disturbance. Respiratory: Negative for cough and shortness of breath. Cardiovascular: Negative for chest pain. Gastrointestinal: Negative for abdominal distention, abdominal pain, bowel incontinence, constipation, diarrhea and nausea. Endocrine: Negative for polydipsia and polyuria. Genitourinary: Negative for bladder incontinence, dysuria and urgency. Musculoskeletal: Positive for back pain. Negative for gait problem and joint swelling. Skin: Negative for color change and rash. Allergic/Immunologic: Negative for environmental allergies and food allergies. Neurological: Negative for speech difficulty and weakness. Hematological: Does not bruise/bleed easily. Psychiatric/Behavioral: Negative for agitation and behavioral problems. Objective:   /62   Pulse 68   Temp 97.1 °F (36.2 °C)   Ht 5' 2\" (1.575 m)   Wt 147 lb (66.7 kg)   SpO2 98%   BMI 26.89 kg/m²     Physical Exam   Constitutional: She appears well-developed and well-nourished. No distress. HENT:   Head: Normocephalic and atraumatic. Right Ear: External ear normal.   Left Ear: External ear normal.   Nose: Nose normal.   Eyes: Pupils are equal, round, and reactive to light. Conjunctivae and EOM are normal. Right eye exhibits no discharge. Left eye exhibits no discharge. Neck: Neck supple. No tracheal deviation present. No thyromegaly present. Cardiovascular: Normal rate and regular rhythm. Pulmonary/Chest: Effort normal. No respiratory distress. Abdominal: She exhibits no distension. Musculoskeletal: She exhibits tenderness (Over right SI joint). She exhibits no edema or deformity. Neurological: She is alert. Coordination normal.   Skin: Skin is warm and dry. No bruising and no rash noted. She is not diaphoretic. Psychiatric: She has a normal mood and affect.  Judgment and thought content normal.       No results found for this visit on 04/02/19. Recent Results (from the past 2016 hour(s))   Basic Metabolic Panel    Collection Time: 02/07/19  9:17 AM   Result Value Ref Range    Sodium 143 132 - 144 mEq/L    Potassium 4.2 3.5 - 5.1 mEq/L    Chloride 103 98 - 107 mEq/L    CO2 25 22 - 29 mEq/L    Anion Gap 15 (H) 7 - 13 mEq/L    Glucose 108 74 - 109 mg/dL    BUN 11 8 - 23 mg/dL    CREATININE 0.57 0.50 - 0.90 mg/dL    GFR Non-African American >60.0 >60    GFR  >60.0 >60    Calcium 9.7 8.6 - 10.2 mg/dL   CBC    Collection Time: 02/07/19  9:17 AM   Result Value Ref Range    WBC 6.2 4.8 - 10.8 K/uL    RBC 5.09 4. 20 - 5.40 M/uL    Hemoglobin 15.5 12.0 - 16.0 g/dL    Hematocrit 45.8 37.0 - 47.0 %    MCV 90.0 82.0 - 100.0 fL    MCH 30.4 27.0 - 31.3 pg    MCHC 33.8 33.0 - 37.0 %    RDW 14.0 11.5 - 14.5 %    Platelets 295 722 - 078 K/uL   TYPE AND SCREEN    Collection Time: 02/07/19  9:18 AM   Result Value Ref Range    ABO/Rh O POS     Antibody Screen NEG    CBC    Collection Time: 02/15/19  6:43 AM   Result Value Ref Range    WBC 14.6 (H) 4.8 - 10.8 K/uL    RBC 4.25 4.20 - 5.40 M/uL    Hemoglobin 12.7 12.0 - 16.0 g/dL    Hematocrit 38.2 37.0 - 47.0 %    MCV 90.0 82.0 - 100.0 fL    MCH 29.9 27.0 - 31.3 pg    MCHC 33.3 33.0 - 37.0 %    RDW 13.9 11.5 - 14.5 %    Platelets 219 446 - 684 K/uL   POCT Urinalysis No Micro (Auto)    Collection Time: 03/22/19  2:42 PM   Result Value Ref Range    Color, UA yellow     Clarity, UA clear     Glucose, UA POC n     Bilirubin, UA n     Ketones, UA n     Spec Grav, UA 1.025     Blood, UA POC 10     pH, UA 6.0     Protein, UA POC n     Urobilinogen, UA 0.2     Leukocytes, UA 70     Nitrite, UA n            Assessment:       Diagnosis Orders   1.  Sacroiliac joint pain  [facility] [place]    Referral External to General Surgery - Dre Partida MD         Orders Placed This Encounter   Procedures   [facility] [place] Referral Priority:   Routine     Referral Type:   Eval and Treat     Referral Reason:   Specialty Services Required     Requested Specialty:   Physical Therapy     Number of Visits Requested:   1    Referral External to sports medicine, Errol Reyes     Referral Priority:   Routine     Referral Type:   Eval and Treat     Referral Reason:   Specialty Services Required     Referred to Provider:   Yasmine Dumont DO     Requested Specialty:   Family Medicine     Number of Visits Requested:   1         Plan:   Return if symptoms worsen or fail to improve. Patient Instructions       Patient Education        Coccyx Pain: Care Instructions  Your Care Instructions    The coccyx is your tailbone. You can have pain in your tailbone from a fall or other injury. Pregnancy and childbirth also can cause tailbone pain. Sometimes, the cause of pain is not known. A tailbone injury causes pain when you sit, especially when you slump or sit on a hard seat. Straining to have a bowel movement also can be very painful. Tailbone injuries can take several months to heal, but in some cases the pain goes even longer. You can take steps at home to ease the pain. In some cases, a doctor injects a corticosteroid medicine into the coccyx to reduce swelling and pain. Follow-up care is a key part of your treatment and safety. Be sure to make and go to all appointments, and call your doctor if you are having problems. It's also a good idea to know your test results and keep a list of the medicines you take. How can you care for yourself at home? · Take pain medicines exactly as directed. ? If the doctor gave you a prescription medicine for pain, take it as prescribed. ? If you are not taking a prescription pain medicine, take an over-the-counter medicine to reduce pain. · Put ice or a cold pack on your tailbone for 10 to 20 minutes at a time.  Try to do this every 1 to 2 hours for the next 3 days (when you are awake) or until the swelling goes down. Put a thin cloth between the ice and your skin. · About 2 or 3 days after your injury, you can alternate ice and heat. To soothe the tailbone area, take a warm bath for 20 minutes, 3 or 4 times a day. · Sit on soft, padded surfaces. A doughnut-shaped pillow can take pressure off the tailbone. · Avoid constipation, because straining to have a bowel movement will increase your tailbone pain. ? Include fruits, vegetables, beans, and whole grains in your diet each day. These foods are high in fiber. ? Drink plenty of fluids, enough so that your urine is light yellow or clear like water. If you have kidney, heart, or liver disease and have to limit fluids, talk with your doctor before you increase the amount of fluids you drink. ? Get some exercise every day. Build up slowly to 30 to 60 minutes a day on 5 or more days of the week. ? Take a fiber supplement, such as Citrucel or Metamucil, every day if needed. Read and follow all instructions on the label. ? Schedule time each day for a bowel movement. A daily routine may help. Take your time and do not strain when having a bowel movement. · Follow your doctor's directions for stretching and other exercises that might help with pain. When should you call for help? Call 911 anytime you think you may need emergency care. For example, call if:    · You are unable to move a leg at all.   [facility] your doctor now or seek immediate medical care if:    · You have new or worse symptoms in your legs or buttocks. Symptoms may include:  ? Numbness or tingling. ? Weakness. ? Pain.     · You lose bladder or bowel control.    Watch closely for changes in your health, and be sure to contact your doctor if:    · You are not getting better as expected. Where can you learn more? Go to https://chpeemanueleb.Simplee. org and sign in to your Cheggin account.  Enter L116 in the Neighborhoods box to learn more about \"Coccyx Pain: Care Instructions. \"     If you do not have an account, please click on the \"Sign Up Now\" link. Current as of: September 23, 2018  Content Version: 11.9  © 6201-3713 [employer]AF[employer], Incorporated. Care instructions adapted under license by [facility] ([facility]). If you have questions about a medical condition or this instruction, always ask your healthcare professional. [address] any warranty or liability for your use of this information. Garett Solis M.D.

## 2019-04-02 NOTE — PATIENT INSTRUCTIONS
Patient Education        Coccyx Pain: Care Instructions  Your Care Instructions    The coccyx is your tailbone. You can have pain in your tailbone from a fall or other injury. Pregnancy and childbirth also can cause tailbone pain. Sometimes, the cause of pain is not known. A tailbone injury causes pain when you sit, especially when you slump or sit on a hard seat. Straining to have a bowel movement also can be very painful. Tailbone injuries can take several months to heal, but in some cases the pain goes even longer. You can take steps at home to ease the pain. In some cases, a doctor injects a corticosteroid medicine into the coccyx to reduce swelling and pain. Follow-up care is a key part of your treatment and safety. Be sure to make and go to all appointments, and call your doctor if you are having problems. It's also a good idea to know your test results and keep a list of the medicines you take. How can you care for yourself at home? · Take pain medicines exactly as directed. ? If the doctor gave you a prescription medicine for pain, take it as prescribed. ? If you are not taking a prescription pain medicine, take an over-the-counter medicine to reduce pain. · Put ice or a cold pack on your tailbone for 10 to 20 minutes at a time. Try to do this every 1 to 2 hours for the next 3 days (when you are awake) or until the swelling goes down. Put a thin cloth between the ice and your skin. · About 2 or 3 days after your injury, you can alternate ice and heat. To soothe the tailbone area, take a warm bath for 20 minutes, 3 or 4 times a day. · Sit on soft, padded surfaces. A doughnut-shaped pillow can take pressure off the tailbone. · Avoid constipation, because straining to have a bowel movement will increase your tailbone pain. ? Include fruits, vegetables, beans, and whole grains in your diet each day. These foods are high in fiber.   ? Drink plenty of fluids, enough so that your urine is light yellow or clear like water. If you have kidney, heart, or liver disease and have to limit fluids, talk with your doctor before you increase the amount of fluids you drink. ? Get some exercise every day. Build up slowly to 30 to 60 minutes a day on 5 or more days of the week. ? Take a fiber supplement, such as Citrucel or Metamucil, every day if needed. Read and follow all instructions on the label. ? Schedule time each day for a bowel movement. A daily routine may help. Take your time and do not strain when having a bowel movement. · Follow your doctor's directions for stretching and other exercises that might help with pain. When should you call for help? Call 911 anytime you think you may need emergency care. For example, call if:    · You are unable to move a leg at all.   [facility] your doctor now or seek immediate medical care if:    · You have new or worse symptoms in your legs or buttocks. Symptoms may include:  ? Numbness or tingling. ? Weakness. ? Pain.     · You lose bladder or bowel control.    Watch closely for changes in your health, and be sure to contact your doctor if:    · You are not getting better as expected. Where can you learn more? Go to https://adFreeq.[employer]. org and sign in to your Tapactive account. Enter X031 in the NitroSecurity box to learn more about \"Coccyx Pain: Care Instructions. \"     If you do not have an account, please click on the \"Sign Up Now\" link. Current as of: September 23, 2018  Content Version: 11.9  © 1596-1402 [employer], Incorporated. Care instructions adapted under license by [facility] ([facility]). If you have questions about a medical condition or this instruction, always ask your healthcare professional. [place] [address] any warranty or liability for your use of this information.

## 2019-04-06 PROBLEM — Z09 POSTOP CHECK: Status: ACTIVE | Noted: 2019-04-06

## 2019-04-09 ENCOUNTER — OFFICE VISIT (OUTPATIENT)
Dept: OBGYN CLINIC | Age: 74
End: 2019-04-09

## 2019-04-09 VITALS
HEIGHT: 62 IN | SYSTOLIC BLOOD PRESSURE: 124 MMHG | BODY MASS INDEX: 27.05 KG/M2 | HEART RATE: 68 BPM | WEIGHT: 147 LBS | DIASTOLIC BLOOD PRESSURE: 60 MMHG

## 2019-04-09 DIAGNOSIS — N81.10 POP-Q STAGE 3 CYSTOCELE: ICD-10-CM

## 2019-04-09 DIAGNOSIS — N81.6 POP-Q STAGE 2 RECTOCELE: ICD-10-CM

## 2019-04-09 DIAGNOSIS — Z09 POSTOP CHECK: Primary | ICD-10-CM

## 2019-04-09 DIAGNOSIS — N81.2 INCOMPLETE UTERINE PROLAPSE: ICD-10-CM

## 2019-04-09 PROCEDURE — 99024 POSTOP FOLLOW-UP VISIT: CPT | Performed by: OBSTETRICS & GYNECOLOGY

## 2019-04-09 NOTE — PROGRESS NOTES
Chaperone for Intimate Exam    1. Was chaperone offered as part of the rooming process? offered, accepted   2. If Chaperone is declined by patient, NA: chaperone was available and exam completed  3.  Chaperone is [facility]

## 2019-04-11 ENCOUNTER — HOSPITAL ENCOUNTER (OUTPATIENT)
Dept: PHYSICAL THERAPY | Age: 74
Setting detail: THERAPIES SERIES
Discharge: HOME | End: 2019-04-11
Payer: MEDICARE

## 2019-04-11 PROCEDURE — 97161 PT EVAL LOW COMPLEX 20 MIN: CPT

## 2019-04-11 PROCEDURE — G8978 MOBILITY CURRENT STATUS: HCPCS

## 2019-04-11 PROCEDURE — 97110 THERAPEUTIC EXERCISES: CPT

## 2019-04-11 PROCEDURE — G8979 MOBILITY GOAL STATUS: HCPCS

## 2019-04-11 NOTE — PROGRESS NOTES
[address]     Ph: [phone]  Fax: [phone]    [x] Certification  [] Recertification [x]  Plan of Care  [] Progress Note [] Discharge      To:  Referring Practitioner: Kalen Miller      From:  Goldie Koyanagi, PT  Patient: Rodrigo Gregory     : 1945  Diagnosis: Sacrococcygeal disorder     Date: 2019  Treatment Diagnosis: sacroiliac pain    Plan of Care/Certification Expiration Date: 19  Progress Report Period from:  2019  to 2019    Total # of Visits to Date: 1   No Show: 0    Canceled Appointment: 0     OBJECTIVE:   Short Term Goals - Time Frame for Short term goals: 1-2 weeks    Goals Current/Discharge status  Met   Short term goal 1: Good knowledge HEP  Ongoing [] yes  [] no       [] yes  [] no       [] yes  [] no       [] yes  [] no      []  yes  []  no     Long Term Goals - Time Frame for Long term goals : 6-8 weeks  Goals Current/ Discharge status Met   Long term goal 1:   Ongoing [] yes  [] no   Long term goal 2: Pain no greater than 1-2/10 with ADL Pain 1 with exercise [] yes  [] no   Long term goal 3: Good understanding proper body mechanics Fair knowledge body mechanics [] yes  [] no   Long term goal 4: Hip extension and HS strength to 4+/5 for improvement with ADL Strength RLE  Comment: Hip extension 4-/5, knee flexion 4-/5 (prone)  Strength LLE  Strength LLE: WFL          [] yes  [] no      [] yes  [] no       [] yes  [] no       [] yes  [] no        Body structures, Functions, Activity limitations: Decreased functional mobility , Decreased ROM, Increased Pain  Specific instructions for Next Treatment: add ther ex as able  Prognosis: Excellent  Discharge Recommendations: Continue to assess pending progress      New Education Provided: Instructed in use of lumbar roll and HEP  G-Codes  PT G-Codes  Functional Assessment Tool Used: Oswestry  Score: 12  Functional Limitation: Mobility: Walking and moving around  Mobility: Walking and Moving Around Current Status (): At least 20 percent but less than 40 percent impaired, limited or restricted  Mobility: Walking and Moving Around Goal Status (): 0 percent impaired, limited or restricted    PLAN: [] Evaluate and Treat  Frequency/Duration:  Plan  Times per week: 2 X week  Plan weeks: 3-4 weeks  Specific instructions for Next Treatment: add ther ex as able  Current Treatment Recommendations: Home Exercise Program, Integrated Dry Needling, Modalities, Pain Management, Aquatics, Patient/Caregiver Education & Training, Manual Therapy - Joint Manipulation, Strengthening, Gait Training     Precautions:             ?     Patient Status:[x] Continue/ Initiate plan of Care    [] Discharge PT. Recommend pt continue with HEP. [] Additional visits requested, Please re-certify for additional visits:          Signature: Electronically signed by Rajwinder Mccurdy PT on 4/11/19 at 3:02 PM      If you have any questions or concerns, please don't hesitate to call. Thank you for your referral.    I have reviewed this plan of care and certify a need for medically necessary rehabilitation services.     Physician Signature:__________________________________________________________  Date:  Please sign and return

## 2019-04-11 NOTE — PROGRESS NOTES
[address]  PHYSICAL THERAPY EVALUATION    Date: 2019  Patient Name: Willie Crowder       MRN: [id number]   Account: [de-identified]   : 1945  (68 y.o.)   Gender: female   Referring Practitioner: Nahed Son                 Diagnosis: Sacrococcygeal disorder  Treatment Diagnosis: sacroiliac pain  Additional Pertinent Hx: 2019  Hysterectomy and bladder repair      Past Medical History:  has a past medical history of Abnormal EKG, BPPV (benign paroxysmal positional vertigo), Colon polyps, Elevated liver enzymes, Family history of heart disease, Female bladder prolapse, Heart murmur, History of bone density study, History of mammography, diagnostic, Hyperlipidemia, Hypertension, Internal hemorrhoids, Normal cardiac stress test, Osteoarthritis, multiple sites, Osteopenia, Recurrent cold sores, Rheumatic fever, and Trigger thumb of left hand. Past Surgical History:   has a past surgical history that includes Colonoscopy (2006); Colonoscopy (2016); pr incise finger tendon sheath (Left, 2017); HYSTERECTOMY VAGINAL (N/A, 2019); pr release of ureter (N/A, 2019); and Vagina surgery (N/A, 2019). Vital Signs  Patient Currently in Pain: No   Pain Screening  Patient Currently in Pain: No      Lives With: Alone  Type of Home: House  Home Layout: Two level;Bed/Bath upstairs  Home Access: Stairs to enter with rails  Entrance Stairs - Number of Steps: 5  Entrance Stairs - Rails: Both  Bathroom Shower/Tub: Tub/Shower unit  ADL Assistance: Independent  Homemaking Assistance: Independent  Ambulation Assistance: Independent  Transfer Assistance: Independent  Mode of Transportation: Car  Occupation: Retired  Leisure & Hobbies: [address] activities, read, swim         Subjective:  Subjective: Pain right SI , anterior thing and distal lower leg.   Started on 3/21/2019,  , Pain reducing over time     Comments: Patient improving    Objective:   Sensation  Overall Sensation Status: WNL       Strength RLE  Comment: Hip extension 4-/5, knee flexion 4-/5 (prone)  Strength LLE  Strength LLE: WFL      AROM RLE (degrees)  RLE AROM: WNL     AROM LLE (degrees)  LLE AROM : WNL        AROM RUE (degrees)  RUE AROM : WNL  AROM LUE (degrees)  LUE AROM : WNL    Spine  Lumbar: Forward flexion limited by 25 % , extension limited 45%, SB limited 45% bilaterally    Observation/Palpation  Posture: Fair  Palpation: Tender to palpation right sacral sulcus and lateral to the above  Observation: No major pelvic assymetry noted. General  tightness in L2 thru L5 with PA              Additional Measures  Special Tests: Patricks negative, slump negative, SLR negative         Exercises:   Exercises  Exercise 1: LB/right SI  Exercise 2: LTR X 10  Exercise 3: Bridging X 10 3 second hold  Exercise 4: Double knee to chest X 10 5 second hold  Exercise 5: TA activation X 10  Exercise 6: Prone Prop*  Exercise 7: Press ups*  Exercise 8: DLS supine*  Exercise 9: Sitting knee flexion with theraband right LE*  Aquatic Therapeutic Exercises:                                   Modalities:     Manual:     *Indicates exercise,modality, or manual techniques to be initiated when appropriate  Assessment: Body structures, Functions, Activity limitations: Decreased functional mobility , Decreased ROM, Increased Pain  Specific instructions for Next Treatment: add ther ex as able  Prognosis: Excellent  Discharge Recommendations: Continue to assess pending progress        Decision Making: Low Complexity  History: Pain began approx 4 weeks after hysterectomy which was on  2/14/2019. Insiduous onset.   Worse with walking, Better with sitting  Exam: Low back/SI  Weakness HS and hip extensors  Clinical Presentation: Pain is getting better with time        Plan  Frequency/Duration:  Plan  Times per week: 2 X week  Plan weeks: 3-4 weeks  Specific instructions for Next Treatment: add ther ex as able  Current Treatment Recommendations: Home Exercise Program, Integrated Dry Needling, Modalities, Pain Management, Aquatics, Patient/Caregiver Education & Training, Manual Therapy - Joint Manipulation, Strengthening, Gait Training       G-Codes  PT G-Codes  Functional Assessment Tool Used: Oswestry  Score: 12  Functional Limitation: Mobility: Walking and moving around  Mobility: Walking and Moving Around Current Status (): At least 20 percent but less than 40 percent impaired, limited or restricted  Mobility: Walking and Moving Around Goal Status (): 0 percent impaired, limited or restricted    Patient Education  New Education Provided: Instructed in use of lumbar roll and HEP    POST-PAIN     Pain Rating (0-10 pain scale): 1  /10  Location and pain description same as pre-treatment unless indicated. Action: [x] NA  [] Call Physician  [] Perform HEP  [] Meds as prescribed    Evaluation and patient rights have been reviewed and patient agrees with plan of care. Yes  []  No  []   Explain:       Horacio Fall Risk Assessment  Risk Factor Scale  Score   History of Falls [] Yes  [] No 25  0 0   Secondary Diagnosis [] Yes  [] No 15  0 0   Ambulatory Aid [] Furniture  [] Crutches/cane/walker  [] None/bedrest/wheelchair/nurse 30  15  0 0   IV/Heparin Lock [] Yes  [] No 20  0 0   Gait/Transferring [] Impaired  [] Weak  [] Normal/bedrest/immobile 20  10  0 0   Mental Status [] Forgets limitations  [] Oriented to own ability 15  0 0      Total:0     Based on the Assessment score: check the appropriate box.   [x]  No intervention needed   Low =   Score of 0-24  []  Use standard prevention interventions Moderate =  Score of 24-44   [] Discuss fall prevention strategies   [] Indicate moderate falls risk on eval  []  Use high risk prevention interventions High = Score of 45 and higher   [] Discuss fall prevention strategies   [] Provide supervision during treatment time    Goals  Short term goals  Time Frame for Short term goals: 1-2 weeks  Short term goal 1: Good knowledge HEP  Long term goals  Time Frame for Long term goals : 6-8 weeks  Long term goal 1:    Long term goal 2: Pain no greater than 1-2/10 with ADL  Long term goal 3: Good understanding proper body mechanics  Long term goal 4: Hip extension and HS strength to 4+/5 for improvement with ADL    Treatment Initiated :  Ther ex, Eval.       PT Individual Minutes  Time In: 1400  Time Out: 3526  Minutes: 55  Timed Code Treatment Minutes: 20 Minutes  Procedure Minutes:  Electronically signed by Rajwinder Mccurdy PT on 4/11/19 at 3:01 PM

## 2019-04-15 NOTE — PROGRESS NOTES
[address], [address] is a 68y.o. year old female who presents to the office  7 weeks status post TLH BSO  Vaginal bilateral SSLS  With Anterior REPAIR and cystoscopy    } for INCOMPLETE UTERINE PROLAPSE, POP Q STAGE 3 CYSTOCELE    . Bowel movements are Normal.  Thepatient is not having any pain. Satisfiedwith post-procedure results. Vitals:  /60 (Site: Right Upper Arm, Position: Sitting, Cuff Size: Medium Adult)   Pulse 68   Ht 5' 2\" (1.575 m)   Wt 147 lb (66.7 kg)   BMI 26.89 kg/m²   Allergies:  Patient has no known allergies.   Past Medical History:   Diagnosis Date    Abnormal EKG     BPPV (benign paroxysmal positional vertigo)     Colon polyps 09/2016    needs f/u in 5 years    Elevated liver enzymes 11/5/2013    Family history of heart disease     Female bladder prolapse     Heart murmur     History of bone density study 4/7/11    History of mammography, diagnostic 4/13/11    Category 3 Left mammogram/ Category 2 Right mammogram    Hyperlipidemia     on meds > 10 yrs    Hypertension     on meds > 10 yrs    Internal hemorrhoids     Normal cardiac stress test 12/09/2015    Osteoarthritis, multiple sites     shoulders     Osteopenia 06/09/2015    -1.4; -1.5 9/2018    Recurrent cold sores     Rheumatic fever     Trigger thumb of left hand      Past Surgical History:   Procedure Laterality Date    COLONOSCOPY  7/12/2006    COLONOSCOPY  09/12/2016    EN MYERS MD    HYSTERECTOMY VAGINAL N/A 2/14/2019    TLH BSO performed by Kita Esquivel MD at [address] Left 5/9/2017    LEFT HAND  FINGER TRIGGER RELEASE THUMB, SUPINE  performed by Heather Jennings MD at [address] [facility] N/A 2/14/2019    USLS POSSIBLE SSLS performed by Kita Esquivel MD at [address]  N/A 2/14/2019    A-P REPAIR performed by Kita Esquivel MD at [facility]     OB History    None       Family History   Problem Relation Age of Onset    Asthma Father Contraceptive method:  none    Patient's medications, allergies, past medical, surgical,social and family histories were reviewed and updated as appropriate. Review of Systems  Review of Systems   All other systems reviewed and are negative. Review of Systems  Constitutional: Negative for chills and fever. Respiratory: Negative for coughand shortness of breath. Cardiovascular: Negative for chestpain and palpitations. Gastrointestinal: Negative for nauseaand vomiting. Genitourinary: Negative for dysuria, frequencyand urgency. Musculoskeletal: Negative for myalgias. Neurological: Negative for dizziness, seizures andheadaches. Psychiatric/Behavioral: Negativefor depression and suicidal ideas. Physical Exam  Physical Exam  Physical Exam   Constitutional: Sheis well-developed, well-nourished, and in no distress. Cardiovascular: Normal rate and regularrhythm. Pulmonary/Chest: Effort normal and breath sounds normal.  Abdominal: Soft. Bowel sounds are normal.   Incision/s intact clean and dry. Healingadequately. Genitourinary:   Genitourinary Comments: Pelvic exam shows normal vagina, normal vaginal mucosa, absent cervix amount was suspended vaginal apex. Repair sites healed adequately vaginal cuff is also intact and healing adequately. Normal vulva. Normal urethra. Assessment:     Patient state:  Doingwell postoperatively. Operative findings again reviewed. Diagnosis Orders   1. Postop check     2. POP-Q stage 3 cystocele     3. Incomplete uterine prolapse     4. POP-Q stage 2 rectocele          Pathology reportdiscussed. none     Plan:     7 weeks postop   Patient doing well  Can resume normal sexual activity  Can resume normal physical activity and normal daily activities  Recommended continuing Vaginal estrogen cream   Patient to follow up on next annual exam visit  No orders of the defined types were placed in this encounter.     No orders of the defined types were placed in this encounter. 1. Continueany current medications. 2. Pt is to increase activities as tolerated. 3. Followup: Return for Follow-up on next annual exam.  4. Anticipated return to work 5 weeks.      Lux Parker MD

## 2019-04-16 ENCOUNTER — HOSPITAL ENCOUNTER (OUTPATIENT)
Dept: PHYSICAL THERAPY | Age: 74
Setting detail: THERAPIES SERIES
Discharge: HOME | End: 2019-04-16
Payer: MEDICARE

## 2019-04-16 PROCEDURE — 97110 THERAPEUTIC EXERCISES: CPT

## 2019-04-16 NOTE — PROGRESS NOTES
[address] [address]  Outpatient Physical Therapy    Treatment Note        Date: 2019  Patient: Thelma Rico  : 1945  ACCT #: [de-identified]  Referring Practitioner: Gilda Adjutant  Diagnosis: Sacrococcygeal disorder    Visit Information:  PT Visit Information  Onset Date: 19  PT Insurance Information: [employer]na Medicare  Total # of Visits to Date: 2  Plan of Care/Certification Expiration Date: 19  No Show: 0  Canceled Appointment: 0  Progress Note Counter: 2  of 10    Subjective: Pain right SI , anterior thing and distal lower leg. Started on 3/21/2019,  , Pain reducing over time     Comments: Patient improving  HEP Compliance:  x Good ? Fair ? Poor ? Reports not doing due to:    Vital Signs  Patient Currently in Pain: No   Pain Screening  Patient Currently in Pain: No    OBJECTIVE:   Exercises  Exercise 1: LB/right SI  Exercise 2: LTR X 10   5 second   Exercise 3: Bridging X 10 3 second hold  Exercise 4: Double knee to chest X 10 5 second hold  Exercise 5: TA activation X 10  Exercise 6: Prone Prop*  Exercise 7: Press ups*  Exercise 8: DLS supine Marching X 10   Exercise 9:    Exercise 10: TA with leg walk outs X 10        Strength: xNT  ? MMT completed:     ROM: x NT  ? ROM measurements:   Manual:    Modalities:    *Indicates exercise, modality, or manual techniques to be initiated when appropriate    Assessment: Body structures, Functions, Activity limitations: Decreased functional mobility , Decreased ROM, Increased Pain  Assessment: Patient with minimal pain. Added to HEP. Had some discomfort with Bridging on left side which went away.     Treatment Diagnosis: sacroiliac pain  Prognosis: Excellent  Patient Education: Cristin Sen in use of lumbar roll and HEP    Goals:  Short term goals  Time Frame for Short term goals: 1-2 weeks  Short term goal 1: Good knowledge HEP    Long term goals  Time Frame for Long term goals : 6-8 weeks  Long term goal 1:    Long term goal 2: Pain no greater than 1-2/10 with ADL  Long term goal 3: Good understanding proper body mechanics  Long term goal 4: Hip extension and HS strength to 4+/5 for improvement with ADL  Progress toward goals:    POST-PAIN       Pain Rating (0-10 pain scale):  0 /10   Location and pain description same as pre-treatment unless indicated. Action: ? NA   ? Perform HEP  ? Meds as prescribed  ? Modalities as prescribed   ? Call Physician     Frequency/Duration:  Plan  Times per week: 2 X week  Plan weeks: 3-4 weeks  Specific instructions for Next Treatment: add ther ex as able  Current Treatment Recommendations: Home Exercise Program, Integrated Dry Needling, Modalities, Pain Management, Aquatics, Patient/Caregiver Education & Training, Manual Therapy - Joint Manipulation, Strengthening, Gait Training     Pt to continue current HEP. See objective section for any therapeutic exercise changes, additions or modifications this date. Treatment Initiated :  Ther ex, Eval.       PT Individual Minutes  Time In: 0900  Time Out: 0940  Minutes: 40  Timed Code Treatment Minutes: 38 Minutes  Procedure Minutes:    Signature:  Electronically signed by Rossana Yarbrough PT on 4/16/19 at 9:31 AM

## 2019-04-18 ENCOUNTER — HOSPITAL ENCOUNTER (OUTPATIENT)
Dept: PHYSICAL THERAPY | Age: 74
Setting detail: THERAPIES SERIES
Discharge: HOME | End: 2019-04-18
Payer: MEDICARE

## 2019-04-18 PROCEDURE — 97140 MANUAL THERAPY 1/> REGIONS: CPT

## 2019-04-18 PROCEDURE — 97110 THERAPEUTIC EXERCISES: CPT

## 2019-04-18 NOTE — PROGRESS NOTES
sling); Initiated post MET stab exs w/ good loreto: PFM strengthening also initiated to facilitate pelvic stability and core strength. Treatment Diagnosis: sacroiliac pain; Impaired strength. Prognosis: Excellent  Patient Education: Post MET HEP and HOLD previous HEP     Goals:  Short term goals  Time Frame for Short term goals: 1-2 weeks  Short term goal 1: Good knowledge HEP    Long term goals  Time Frame for Long term goals : 6-8 weeks  Long term goal 1: Pt to demo grossly (=) mobility of B SIJs w/ pelvic/sacral symmetry  Long term goal 2: Pain no greater than 1-2/10 with ADL  Long term goal 3: Good understanding proper body mechanics  Long term goal 4: Hip extension and HS strength to 4+/5 for improvement with ADL  Progress toward goals:    POST-PAIN       Pain Rating (0-10 pain scale): 0  /10   Location and pain description same as pre-treatment unless indicated. Action: [] NA   [] Perform HEP  [] Meds as prescribed  [] Modalities as prescribed   [] Call Physician     Frequency/Duration:  Plan  Times per week: 2 X week  Plan weeks: 3-4 weeks  Specific instructions for Next Treatment: correct pelvic/sacral asymmetries as needed; Cont w/ post MET exs until SI stability attained; Cont w/ incorporating PFM exs to facilitate core stability  Current Treatment Recommendations: Home Exercise Program, Integrated Dry Needling, Modalities, Pain Management, Aquatics, Patient/Caregiver Education & Training, Manual Therapy - Joint Manipulation, Strengthening, Gait Training     Pt to continue current HEP. See objective section for any therapeutic exercise changes, additions or modifications this date.          PT Individual Minutes  Time In: 2767  Time Out: 1002  Minutes: 41  Timed Code Treatment Minutes: 41 Minutes  Procedure Minutes:0    Signature:  Electronically signed by Sofy Salcido PT on 4/18/19 at 11:08 AM

## 2019-04-23 ENCOUNTER — HOSPITAL ENCOUNTER (OUTPATIENT)
Dept: PHYSICAL THERAPY | Age: 74
Setting detail: THERAPIES SERIES
Discharge: HOME | End: 2019-04-23
Payer: MEDICARE

## 2019-04-23 PROCEDURE — 97140 MANUAL THERAPY 1/> REGIONS: CPT

## 2019-04-23 PROCEDURE — 97110 THERAPEUTIC EXERCISES: CPT

## 2019-04-23 ASSESSMENT — PAIN SCALES - GENERAL: PAINLEVEL_OUTOF10: 1

## 2019-04-23 NOTE — PROGRESS NOTES
[address] [address]  Outpatient Physical Therapy    Treatment Note        Date: 2019  Patient: Harish Eric  :   ACCT #: [de-identified]  Referring Practitioner: Paula Duckworth  Diagnosis: Sacrococcygeal disorder    Visit Information:  PT Visit Information  Onset Date: 19  PT Insurance Information: A[employer]na Medicare  Total # of Visits to Date: 4  Plan of Care/Certification Expiration Date: 19  No Show: 0  Canceled Appointment: 0  Progress Note Counter:  PN due 19    Subjective: Pt cont's to c/o constant ache in LB. Pt reports feeling relief of pain post adjustments LV though feels like Rt SI \"jams up\" soon after. Pt feels as  the bridging exs worsen her pain. HEP Compliance:  [x] Good [] Fair [] Poor [] Reports not doing due to:    Vital Signs  Patient Currently in Pain: Yes   Pain Screening  Patient Currently in Pain: Yes  Pain Assessment  Pain Assessment: 0-10  Pain Level: 1(1-2)    OBJECTIVE:   Exercises  Exercise 1: Sacral self correct 2(3x10) L then R   Exercise 2: iso bridges- NT D/T time, pt reporting inc pain w/ HEP  Exercise 3:  roll for contorl 10-10-10 x 10  Exercise 4:  standing hip abdd- abd 10s x 10 ea  Exercise 5: PFM lift : pt demo's abdominal bulging w/ demo of her Kegel's ( 3 mins); PFM lift in seated x10      Strength: [x] NT  [] MMT completed:      ROM: [x] NT  [] ROM measurements:      Manual:   Manual therapy  Muscle energy: hypo R IW w/ SFBT w/ R: LE long, MET's for ant rot ilium w/o change; Pubic bone symmetrical though Rt isch tube down, MET's for Rt downslip w/ level malleoli and plevis:  Total time 15 min     Assessment: Body structures, Functions, Activity limitations: Decreased functional mobility , Decreased ROM, Increased Pain  Assessment: Pt cont's to demo Rt SIJ hypomobility w/ asymmetries in pelvis upon assessment. MET's performed w/ full correction and symmetry achieved.  Pt expresses discomfort in SI region during ER w/ roll for control therefore cuing given to reduce resistance to <10%. Performed PF isolations in seated to improve technique of \"lifting up from table. \" Pt w/o complaints of ache post tx. Treatment Diagnosis: sacroiliac pain; Impaired strength. Prognosis: Excellent     Goals:  Short term goals  Time Frame for Short term goals: 1-2 weeks  Short term goal 1: Good knowledge HEP  Long term goals  Time Frame for Long term goals : 6-8 weeks  Long term goal 1: Pt to demo grossly (=) mobility of B SIJs w/ pelvic/sacral symmetry  Long term goal 2: Pain no greater than 1-2/10 with ADL  Long term goal 3: Good understanding proper body mechanics  Long term goal 4: Hip extension and HS strength to 4+/5 for improvement with ADL  Progress toward goals: Pelvic/sacral symmetry, PF strength     POST-PAIN       Pain Rating (0-10 pain scale):   0/10  \"I feel good right now. \"   Location and pain description same as pre-treatment unless indicated. Action: [] NA   [] Perform HEP  [] Meds as prescribed  [] Modalities as prescribed   [] Call Physician     Frequency/Duration:  Plan  Times per week: 2 X week  Plan weeks: 3-4 weeks  Specific instructions for Next Treatment: correct pelvic/sacral asymmetries as needed; Cont w/ post MET exs until SI stability attained; Cont w/ incorporating PFM exs to facilitate core stability  Current Treatment Recommendations: Home Exercise Program, Integrated Dry Needling, Modalities, Pain Management, Aquatics, Patient/Caregiver Education & Training, Manual Therapy - Joint Manipulation, Strengthening, Gait Training     Pt to continue current HEP. See objective section for any therapeutic exercise changes, additions or modifications this date.      PT Individual Minutes  Time In: 0802  Time Out: 2792  Minutes: 42  Timed Code Treatment Minutes: 42 Minutes  Procedure Minutes: N/A     Signature:  Electronically signed by Bibi Caraballo PTA on 4/23/19 at 8:54 AM

## 2019-04-25 ENCOUNTER — HOSPITAL ENCOUNTER (OUTPATIENT)
Dept: PHYSICAL THERAPY | Age: 74
Setting detail: THERAPIES SERIES
Discharge: HOME | End: 2019-04-25
Payer: MEDICARE

## 2019-04-25 PROCEDURE — 97140 MANUAL THERAPY 1/> REGIONS: CPT

## 2019-04-25 PROCEDURE — 97110 THERAPEUTIC EXERCISES: CPT

## 2019-04-25 ASSESSMENT — PAIN SCALES - GENERAL: PAINLEVEL_OUTOF10: 2

## 2019-04-25 ASSESSMENT — PAIN DESCRIPTION - ORIENTATION: ORIENTATION: RIGHT

## 2019-04-25 ASSESSMENT — PAIN DESCRIPTION - DESCRIPTORS: DESCRIPTORS: PRESSURE

## 2019-04-25 NOTE — PROGRESS NOTES
[address] [address]  Outpatient Physical Therapy    Treatment Note        Date: 2019  Patient: Aaron Villeda  :   ACCT #: [de-identified]  Referring Practitioner: Segundo Sanchez  Diagnosis: Sacrococcygeal disorder    Visit Information:  PT Visit Information  Onset Date: 19  PT Insurance Information: A[employer] Medicare  Total # of Visits to Date: 5  Plan of Care/Certification Expiration Date: 19  No Show: 0  Canceled Appointment: 0  Progress Note Counter:  PN due 19    Subjective: Pt reports having relief after pelvic adjustments last visit though pain returned upon walking out of clinic. Pt states \"The bridging exerices weren't as bad as they usually are. \" Pt reports mowing the lawn for 45 min w/ push mower yesterday w/o inc pain post.      HEP Compliance:  [x] Good [] Fair [] Poor [] Reports not doing due to:    Vital Signs  Patient Currently in Pain: Yes   Pain Screening  Patient Currently in Pain: Yes  Pain Assessment  Pain Assessment: 0-10  Pain Level: 2  Pain Location: (RT SI)  Pain Orientation: Right  Pain Descriptors: Pressure    OBJECTIVE:   Exercises  Exercise 1: Sacral self correct 2(3x10) L then R   Exercise 2: iso bridges 10\"x10 (w/o pain)   Exercise 3:  roll for contorl 10-10-10 x 10  Exercise 4:  standing hip abdd- abd 10s x 10 ea  Exercise 5: PFM lift : pt demo's abdominal bulging w/ demo of her Kegel's ( 3 mins); PFM lift in seated x10   Exercise 11: Plie squats w/ focus on maintaining PF/TA iso x10     Strength: [x] NT  [] MMT completed:     ROM: [x] NT  [] ROM measurements:      Manual:   Manual therapy  Muscle energy: hypo Rt IW w/ SFBT; pelvis/sacrum grossly symmetrical w/ = leg length in supine.    Manual traction: Lumbar distaction in supine (inc relief)   Soft Tissue Mobalization: STM to LB/SI w/ focus on Rt   Other: 15 min  total     Modalities:  Modalities  Moist heat: HP to LB in prone 10 min      *Indicates exercise, modality, or manual techniques to be initiated when appropriate    Assessment: Body structures, Functions, Activity limitations: Decreased functional mobility , Decreased ROM, Increased Pain  Assessment: Pt observed to have grossly symmetrical pelvic/sacral landmarks w/ equal leg length noted upon assessment. Held MET's this date though lumbar distraction trialed to dec c/o SI pressure w/ significant relief post. Pt had no c/o pain w/ bridges vs prior sessions. Concluded w/ HP to LB for further relief of mm tightness. Treatment Diagnosis: sacroiliac pain; Impaired strength. Prognosis: Excellent     Goals:  Short term goals  Time Frame for Short term goals: 1-2 weeks  Short term goal 1: Good knowledge HEP  Long term goals  Time Frame for Long term goals : 6-8 weeks  Long term goal 1: Pt to demo grossly (=) mobility of B SIJs w/ pelvic/sacral symmetry  Long term goal 2: Pain no greater than 1-2/10 with ADL  Long term goal 3: Good understanding proper body mechanics  Long term goal 4: Hip extension and HS strength to 4+/5 for improvement with ADL  Progress toward goals: Maintain pelvic symmetry, dec pain     POST-PAIN       Pain Rating (0-10 pain scale):   0/10 \"feels great right now. \"   Location and pain description same as pre-treatment unless indicated. Action: [] NA   [] Perform HEP  [] Meds as prescribed  [] Modalities as prescribed   [] Call Physician     Frequency/Duration:  Plan  Times per week: 2 X week  Plan weeks: 3-4 weeks  Specific instructions for Next Treatment: correct pelvic/sacral asymmetries as needed; Cont w/ post MET exs until SI stability attained; Cont w/ incorporating PFM exs to facilitate core stability  Current Treatment Recommendations: Home Exercise Program, Integrated Dry Needling, Modalities, Pain Management, Aquatics, Patient/Caregiver Education & Training, Manual Therapy - Joint Manipulation, Strengthening, Gait Training     Pt to continue current HEP.   See objective section for any therapeutic exercise changes, additions or modifications this date.     PT Individual Minutes  Time In: 0802  Time Out: 1932  Minutes: 50  Timed Code Treatment Minutes: 40 Minutes  Procedure Minutes: 10 min     Signature:  Electronically signed by Nanci Lang PTA on 4/25/19 at 8:50 AM

## 2019-04-30 ENCOUNTER — HOSPITAL ENCOUNTER (OUTPATIENT)
Dept: PHYSICAL THERAPY | Age: 74
Setting detail: THERAPIES SERIES
Discharge: HOME | End: 2019-04-30
Payer: MEDICARE

## 2019-04-30 PROCEDURE — 97110 THERAPEUTIC EXERCISES: CPT

## 2019-04-30 PROCEDURE — 97140 MANUAL THERAPY 1/> REGIONS: CPT

## 2019-04-30 ASSESSMENT — PAIN DESCRIPTION - DESCRIPTORS: DESCRIPTORS: PRESSURE

## 2019-04-30 ASSESSMENT — PAIN DESCRIPTION - ORIENTATION: ORIENTATION: RIGHT

## 2019-04-30 ASSESSMENT — PAIN SCALES - GENERAL: PAINLEVEL_OUTOF10: 5

## 2019-04-30 NOTE — PROGRESS NOTES
[id number] [address]  Outpatient Physical Therapy    Treatment Note        Date: 2019  Patient: Bartolo Gates  : 1945  ACCT #: [de-identified]  Referring Practitioner: Bibiana Joshua  Diagnosis: Sacrococcygeal disorder    Visit Information:  PT Visit Information  Onset Date: 19  PT Insurance Information: Aetna Medicare  Total # of Visits to Date: 6  Plan of Care/Certification Expiration Date: 19  No Show: 0  Canceled Appointment: 0  Progress Note Counter:  PN due 19    Subjective: Pt eports body wise she is not doing well ; 2 hours after leaving last Thursday has been struggling: Did ex Fri sat and  but not monday; Pain in R buttocks, ant thigh and ant shin; States going out of town and will return to PT in [place] 2 weeks      HEP Compliance:  [x] Good [] Fair [] Poor [] Reports not doing due to:    Vital Signs  Patient Currently in Pain: Yes   Pain Screening  Patient Currently in Pain: Yes  Pain Assessment  Pain Assessment: 0-10  Pain Level: 5  Pain Location: (R SIJ pain radiating to R shin)  Pain Orientation: Right  Pain Descriptors: Pressure    OBJECTIVE:   Exercises  Exercise 1: Sacral self correct 2(3x10) L then R    Exercise 13: edu and trial of Lx roll for posture when on ariplane  Exercise 14: edu to trial prone poositions or R RK positions if needed when out of town    Strength: [x] NT  [] MMT completed:    ROM: [x] NT  [] ROM measurements:     Manual:   Manual therapy  Muscle energy: hypo L IS w/ Stork and SFBT w/ L: LE short, inflare, ant rotation, dep pube,; aMET fo dep pube,; downslip ischial tube noted; MET to correction ; Corrected pelvic alignment and apparant LLD: Sacral torsion noted W R ss stuck forward ; Manual MEt performed by Davi Licea PTA under supervision of PT  Other: 23 mins    *Indicates exercise, modality, or manual techniques to be initiated when appropriate    Assessment:    Body structures, Functions, Activity limitations: Decreased functional Minutes: 48 Minutes  Procedure Minutes: 0    Signature:  Electronically signed by Bakari Crews PTA on 4/30/19 at 10:43 AM  Electronically signed by Hunter Rothman PT on 4/30/2019 at 10:52 AM

## 2019-05-06 PROBLEM — Z09 POSTOP CHECK: Status: RESOLVED | Noted: 2019-04-06 | Resolved: 2019-05-06

## 2019-05-14 ENCOUNTER — HOSPITAL ENCOUNTER (OUTPATIENT)
Dept: PHYSICAL THERAPY | Age: 74
Setting detail: THERAPIES SERIES
Discharge: HOME | End: 2019-05-14
Payer: MEDICARE

## 2019-05-14 PROCEDURE — 97110 THERAPEUTIC EXERCISES: CPT

## 2019-05-14 PROCEDURE — 97140 MANUAL THERAPY 1/> REGIONS: CPT

## 2019-05-14 ASSESSMENT — PAIN SCALES - GENERAL: PAINLEVEL_OUTOF10: 1

## 2019-05-14 ASSESSMENT — PAIN DESCRIPTION - DESCRIPTORS: DESCRIPTORS: ACHING

## 2019-05-14 ASSESSMENT — PAIN DESCRIPTION - ORIENTATION: ORIENTATION: RIGHT

## 2019-05-14 NOTE — PROGRESS NOTES
Sharyn grace, [address]     Ph: [phone]  Fax: [phone]    [] Certification  [] Recertification []  Plan of Care  [x] Progress Note [] Discharge      To:  Referring Practitioner: Kalen Miller      From:  Noemy Luther, PT  Patient: Rodrigo Gregory     : 1945  Diagnosis: Sacrococcygeal disorder     Date:19 for DOS  2019  Treatment Diagnosis: sacroiliac pain; Impaired strength. Plan of Care/Certification Expiration Date: 19  Progress Report Period from:  2019  to 2019    Total # of Visits to Date: 7   No Show: 0    Canceled Appointment: 0     OBJECTIVE:   Long Term Goals - Time Frame for Long term goals : 6-8 weeks  Goals Current/ Discharge status Met   Long term goal 1: Pt to demo grossly (=) mobility of B SIJs w/ pelvic/sacral symmetry Symmetry achieved post MET corrections, but has not been able to maintain corrections. [x] yes  [] no   Long term goal 2: Pain no greater than 1-2/10 with ADL   Pain Location: (Rt ALMANZA and Rt shin )    Pain Level: 1(1-2)    Pain Descriptors: Aching  Pain ranges 0-4/10 w/ most pain during prolonged sitting/walking. [] yes  [x] no   Long term goal 3: Good understanding proper body mechanics Good  [x] yes  [] no   Long term goal 4: Hip extension and HS strength to 4+/5 for improvement with ADL Strength RLE  Comment: knee ext 4/5, knee flex 5/5, hip ER 4+/5, hip IR 5/5 ; hip flex 4+/5, hip ABD 5/5, hip ext 4+/5  [x] yes  [] no     Body structures, Functions, Activity limitations: Decreased functional mobility , Decreased ROM, Increased Pain  Assessment:  Pt cont's to demo pelvic and scaral asymmetries and conts to require edu to avoid asymmetrical poistioning; Pt does report that the quality of pain has changed from \"sharp\" to \"dull ache\" and tightness: Pt conts to report PF and abdominal weeakness;  Pt will benefit from cont w/ skilled care to further improve core strength to facilitate maintenance of pelvic/ sacral alignment. Specific instructions for Next Treatment: correct pelvic/sacral asymmetries as needed; Cont w/ post MET exs until SI stability attained; Cont w/ incorporating PFM exs to facilitate core stability  Prognosis: Excellent  Discharge Recommendations: Continue to assess pending progress        PLAN: [x] Evaluate and Treat  Frequency/Duration:  Plan  Times per week: 2 X week  Plan weeks: 3-4 weeks added  Specific instructions for Next Treatment: correct pelvic/sacral asymmetries as needed; Cont w/ post MET exs until SI stability attained; Cont w/ incorporating PFM exs to facilitate core stability  Current Treatment Recommendations: Home Exercise Program, Integrated Dry Needling, Modalities, Pain Management, Aquatics, Patient/Caregiver Education & Training, Manual Therapy - Joint Manipulation, Strengthening, Gait Training  Plan Comment: PN completed this date                  ? Patient Status:[x] Continue/ Initiate plan of Care    [] Discharge PT. Recommend pt continue with HEP. [] Additional visits requested, Please re-certify for additional visits:          Signature: Objective measures taken by: Electronically signed by Josiah Thayer PTA on 5/14/19 at 12:55 PM  Electronically signed by Aneesh Strickland PT on 5/16/2019 at 12:34 PM      If you have any questions or concerns, please don't hesitate to call. Thank you for your referral.    I have reviewed this plan of care and certify a need for medically necessary rehabilitation services.     Physician Signature:__________________________________________________________  Date:  Please sign and return

## 2019-05-14 NOTE — PROGRESS NOTES
[address] [address]  Outpatient Physical Therapy    Treatment Note        Date: 2019  Patient: Tucker Hills  : 1945  ACCT #: [de-identified]  Referring Practitioner: Sariah Overall  Diagnosis: Sacrococcygeal disorder    Visit Information:  PT Visit Information  Onset Date: 19  PT Insurance Information: A[employer]na Medicare  Total # of Visits to Date: 7  Plan of Care/Certification Expiration Date: 19  No Show: 0  Canceled Appointment: 0  Progress Note Counter: 7 /8 PN completed this date     Subjective: Pt returning to PT from 2 week hiatus D/T being in [place] w/ [facility]. Pt states \"I've been feeling much better. Pain has been more of an ache vs sharp pain. \"      HEP Compliance:  [x] Good [] Fair [] Poor [] Reports not doing due to:    Vital Signs  Patient Currently in Pain: Yes   Pain Screening  Patient Currently in Pain: Yes  Pain Assessment  Pain Assessment: 0-10  Pain Level: 1(1-2)  Pain Location: (Rt ALMANZA and Rt shin )  Pain Orientation: Right  Pain Descriptors: Aching    OBJECTIVE:   Exercises  Exercise 1: Sacral self correct- Held this date D/T no pain post corrections   Exercise 2: iso bridges 10\"x10 (w/o pain)   Exercise 4:  standing hip abdd- abd 10s x 10 ea  Exercise 5: PFM lift : pt demo's abdominal bulging w/ demo of her Kegel's ( 3 mins); PFM lift in seated x10   Exercise 12: Objective measures 10 min     Strength: [] NT  [x] MMT completed:  Strength RLE  Comment: knee ext 4/5, knee flex 5/5, hip ER 4+/5, hip IR 5/5 ; hip flex 4+/5, hip ABD 5/5, hip ext 4+/5      ROM: [x] NT  [] ROM measurements:      Manual:   Manual therapy  Muscle energy: hypo L IS w/ Stork and SFBT w/ L: LE short, outflare and upslipped ischial tube noted; MET for upslip w/ full correction  ; Corrected pelvic alignment and apparant LLD: Sacral torsion noted W R ss stuck forward     Modalities:  Modalities  Moist heat: HP to LB in prone 10 min      *Indicates exercise, modality, or manual techniques to be therapeutic exercise changes, additions or modifications this date.     PT Individual Minutes  Time In: 0804  Time Out: 6871  Minutes: 49  Timed Code Treatment Minutes: 39 Minutes  Procedure Minutes: 10 min     Signature:  Electronically signed by Nate Pritchett PTA on 5/14/19 at 5:49 PM

## 2019-05-14 NOTE — PROGRESS NOTES
[address]       Physical Therapy  Cancellation/No-show Note  Patient Name:  Rosa Luther  :  1945   Date:  2019  Referring Practitioner: Maude Yeung  Diagnosis: Sacrococcygeal disorder    Visit Information:  PT Visit Information  Onset Date: 19  PT Insurance Information: Aetna Medicare  Total # of Visits to Date: 6  Plan of Care/Certification Expiration Date: 19  No Show: 1  Canceled Appointment: 0  Progress Note Counter:  PN due 19 NS 19     For today's appointment patient:  []  Cancelled  []  Rescheduled appointment  [x]  No-show   [x]  Called pt to remind of next appointment     Reason given by patient:  []  Patient ill  []  Conflicting appointment  []  No transportation    []  Conflict with work  []  No reason given  [x]  Other:       Comments:    Attempted leaving VM though unable D/T \"VM box has not been set up yet. \"     Signature: Electronically signed by Nicola Resendiz PTA on 19 at 7:41 AM

## 2019-05-16 ENCOUNTER — APPOINTMENT (OUTPATIENT)
Dept: PHYSICAL THERAPY | Age: 74
End: 2019-05-16
Payer: MEDICARE

## 2019-05-21 ENCOUNTER — HOSPITAL ENCOUNTER (OUTPATIENT)
Dept: PHYSICAL THERAPY | Age: 74
Setting detail: THERAPIES SERIES
Discharge: HOME | End: 2019-05-21
Payer: MEDICARE

## 2019-05-21 PROCEDURE — 97110 THERAPEUTIC EXERCISES: CPT

## 2019-05-21 PROCEDURE — 97140 MANUAL THERAPY 1/> REGIONS: CPT

## 2019-05-21 ASSESSMENT — PAIN DESCRIPTION - ORIENTATION: ORIENTATION: RIGHT

## 2019-05-21 ASSESSMENT — PAIN SCALES - GENERAL: PAINLEVEL_OUTOF10: 1

## 2019-05-21 ASSESSMENT — PAIN DESCRIPTION - LOCATION: LOCATION: SACRUM

## 2019-05-21 ASSESSMENT — PAIN DESCRIPTION - DESCRIPTORS: DESCRIPTORS: BURNING

## 2019-05-21 NOTE — PROGRESS NOTES
[address] [address]  Outpatient Physical Therapy    Treatment Note        Date: 2019  Patient: Horace Rees  :   ACCT #: [de-identified]  Referring Practitioner: Jeanine Watters  Diagnosis: Sacrococcygeal disorder    Visit Information:  PT Visit Information  Onset Date: 19  PT Insurance Information: Aetna Medicare  Total # of Visits to Date: 8  Plan of Care/Certification Expiration Date: 19  No Show: 0  Canceled Appointment: 0  Progress Note Counter: , PN due 19    Subjective: Pt states \"I felt great over the weekend but for some reason yesterday I was having some burning on the Rt side. \" Pt also c/o feeling \"fuzziness and numbness\" in Rt calf/lower leg on occ.       HEP Compliance:  [x] Good [] Fair [] Poor [] Reports not doing due to:    Vital Signs  Patient Currently in Pain: Yes   Pain Screening  Patient Currently in Pain: Yes  Pain Assessment  Pain Assessment: 0-10  Pain Level: 1  Pain Location: Sacrum(Rt sacrum)  Pain Orientation: Right  Pain Descriptors: Burning    OBJECTIVE:   Exercises  Exercise 2: iso bridges 10\"x10 (w/o pain)   Exercise 4:  standing hip abdd- abd 10s x 10 ea  Exercise 5: PFM lift : pt demo's abdominal bulging w/ demo of her Kegel's ( 3 mins); PFM lift in seated x10   Exercise 6: Prone, Prone on elbows 1 min ea (pt edu for HEP upon having LE sx's)   Exercise 7: Press ups x10 (pt edu for HEP upon having LE sx's)   Exercise 8: DLS supine Marching X 15, alt UE/LE x15   Exercise 11: Plie squats w/ focus on maintaining PF/TA iso x10   Exercise 20: HRP: Prone prop, standing exts, TA marches     Strength: [x] NT  [] MMT completed:      ROM: [x] NT  [] ROM measurements:      Manual:   Manual therapy  Muscle energy: improved mobility of L IS w/ Stork and SFBT : LE short in supine and upslipped ischial tube noted; MET for upslip w/ full correction  ; Corrected pelvic alignment and apparant LLD  Other: 10 min     Modalities:  Modalities  Moist heat: Pt declined Minutes  Procedure Minutes: N/A     Signature:  Electronically signed by Ariana Varghese PTA on 5/21/19 at 9:37 AM

## 2019-05-23 ENCOUNTER — HOSPITAL ENCOUNTER (OUTPATIENT)
Dept: PHYSICAL THERAPY | Age: 74
Setting detail: THERAPIES SERIES
Discharge: HOME | End: 2019-05-23
Payer: MEDICARE

## 2019-05-23 PROCEDURE — 97110 THERAPEUTIC EXERCISES: CPT

## 2019-05-23 NOTE — PROGRESS NOTES
[address] [address]  Outpatient Physical Therapy    Treatment Note        Date: 2019  Patient: Messi Harrison  : [birth year]  ACCT #: [de-identified]  Referring Practitioner: Sadaf Dominique  Diagnosis: Sacrococcygeal disorder    Visit Information:  PT Visit Information  Onset Date: 19  PT Insurance Information: Aetna Medicare  Total # of Visits to Date: 9  Plan of Care/Certification Expiration Date: 19  No Show: 0  Canceled Appointment: 0  Progress Note Counter: , PN due 19    Subjective: Pt reports feeling \"good \" today; Pt 7 mins late for appt d/t getting busy at home. Also gerda was in car for a long time the other day and had inc leg pain at night- used the prone exs she ws given and it helped a lot. HEP Compliance:  [x] Good [] Fair [] Poor [] Reports not doing due to:    Vital Signs  Patient Currently in Pain: Denies   Pain Screening  Patient Currently in Pain: Denies    OBJECTIVE:   Exercises  Exercise 2: iso bridges 3 ways 10\"x10 (w/o pain)   Exercise 3:  roll for contorl 10-10-10 x 10  Exercise 4:  standing hip abdd- abd 10s x 10 ea  Exercise 5: PFM lift :3 s hold 3 s rest  x 30; PFM lift in seated x10   Exercise 6: Prone on elbows x 3 min   Exercise 7: Press ups x10   Exercise 8: standing Lx extension x 10  Exercise 11: Plie squats 5 s inhale down, 5 s exhale ascend , 5 s rest w/ focus on maintaining PF/TA iso x10                                            Manual:   Manual therapy  Muscle energy: grossly (=) mobility og B SIJs w/ pelvic and sacral symmetry palpated and observed this date    Modalities:  Modalities  Moist heat: 10 mins to Lumbo-sacral in prone     *Indicates exercise, modality, or manual techniques to be initiated when appropriate    Assessment:         Body structures, Functions, Activity limitations: Decreased functional mobility , Decreased ROM, Increased Pain  Assessment: Pt w/ good mobility and pelvic/sacral alignment this date: Cont w/ deep core

## 2019-05-28 ENCOUNTER — HOSPITAL ENCOUNTER (OUTPATIENT)
Dept: PHYSICAL THERAPY | Age: 74
Setting detail: THERAPIES SERIES
Discharge: HOME | End: 2019-05-28
Payer: MEDICARE

## 2019-05-28 PROCEDURE — 97110 THERAPEUTIC EXERCISES: CPT

## 2019-05-28 NOTE — PROGRESS NOTES
[address] [address]  Outpatient Physical Therapy    Treatment Note        Date: 2019  Patient: Gary Trimble  :   ACCT #: [de-identified]  Referring Practitioner: Ran Rene  Diagnosis: Sacrococcygeal disorder    Visit Information:  PT Visit Information  Onset Date: 19  PT Insurance Information: Aetna Medicare  Total # of Visits to Date: 10  Plan of Care/Certification Expiration Date: 19  No Show: 0  Canceled Appointment: 0  Progress Note Counter: 3/8, PN due 19    Subjective: Pt states \"I feel great. \" Pt denies having any pain over 3 way weekend. HEP Compliance:  [x] Good [] Fair [] Poor [] Reports not doing due to:    Vital Signs  Patient Currently in Pain: Denies   Pain Screening  Patient Currently in Pain: Denies    OBJECTIVE:   Exercises  Exercise 2: iso bridges 3 ways 10\"x10 (w/o pain)   Exercise 3:  roll for contorl 10-10-10 x 10  Exercise 4:  standing hip abdd- abd 10s x 10 ea  Exercise 5: PFM lift :3 s hold 3 s rest  x 30 in H/L   Exercise 6: Prone on elbows x 3 min   Exercise 7: Press ups x10   Exercise 8: standing Lx extension 2x 10  Exercise 11: Plie squats 5 s inhale down, 5 s exhale ascend , 5 s rest w/ focus on maintaining PF/TA iso x10      Strength: [x] NT  [] MMT completed:     ROM: [x] NT  [] ROM measurements:      Manual:   Manual therapy  Muscle energy: grossly (=) mobility og B SIJs w/ pelvic and sacral symmetry palpated and observed this date    Modalities:  Modalities  Moist heat: 10 mins to Lumbo-sacral in prone     *Indicates exercise, modality, or manual techniques to be initiated when appropriate    Assessment: Body structures, Functions, Activity limitations: Decreased functional mobility , Decreased ROM, Increased Pain  Assessment: Pt observed to have good maintenance of pelvic/sacral symmetry since last visit. Cont'd w/ deep core stabilizations and prone exs w/ good loreto.  Pt reports dec frequency and levels of pain ranging from 0-2/10 at most within the past week. Treatment Diagnosis: sacroiliac pain; Impaired strength. Prognosis: Excellent     Goals:  Short term goals  Time Frame for Short term goals: 1-2 weeks  Short term goal 1: Good knowledge HEP  Long term goals  Time Frame for Long term goals : 6-8 weeks  Long term goal 1: Pt to demo grossly (=) mobility of B SIJs w/ pelvic/sacral symmetry  Long term goal 2: Pain no greater than 1-2/10 with ADL  Long term goal 3: Good understanding proper body mechanics  Long term goal 4: Hip extension and HS strength to 4+/5 for improvement with ADL  Progress toward goals: B hip strength, maintenance of pelvic/sacral symmetry    POST-PAIN       Pain Rating (0-10 pain scale):   0/10   Location and pain description same as pre-treatment unless indicated. Action: [] NA   [] Perform HEP  [] Meds as prescribed  [] Modalities as prescribed   [] Call Physician     Frequency/Duration:  Plan  Times per week: 2 X week  Plan weeks: 3-4 weeks added   Current Treatment Recommendations: Home Exercise Program, Integrated Dry Needling, Modalities, Pain Management, Aquatics, Patient/Caregiver Education & Training, Manual Therapy - Joint Manipulation, Strengthening, Gait Training     Pt to continue current HEP. See objective section for any therapeutic exercise changes, additions or modifications this date.      PT Individual Minutes  Time In: 0802  Time Out: 0257  Minutes: 48  Timed Code Treatment Minutes: 38 Minutes  Procedure Minutes: 10 min     Signature:  Electronically signed by Flory Mckeon PTA on 5/28/19 at 8:40 AM

## 2019-05-30 ENCOUNTER — HOSPITAL ENCOUNTER (OUTPATIENT)
Dept: PHYSICAL THERAPY | Age: 74
Setting detail: THERAPIES SERIES
Discharge: HOME | End: 2019-05-30
Payer: MEDICARE

## 2019-05-30 PROCEDURE — 97110 THERAPEUTIC EXERCISES: CPT

## 2019-05-30 ASSESSMENT — PAIN SCALES - GENERAL: PAINLEVEL_OUTOF10: 0

## 2019-05-30 ASSESSMENT — PAIN DESCRIPTION - PROGRESSION: CLINICAL_PROGRESSION: GRADUALLY IMPROVING

## 2019-05-30 ASSESSMENT — PAIN - FUNCTIONAL ASSESSMENT: PAIN_FUNCTIONAL_ASSESSMENT: ACTIVITIES ARE NOT PREVENTED

## 2019-05-30 NOTE — PROGRESS NOTES
[address] [address]  Outpatient Physical Therapy    Treatment Note        Date: 2019  Patient: Horace Rees  : 1945  ACCT #: [de-identified]  Referring Practitioner: Jeanine Watters  Diagnosis: Sacrococcygeal disorder    Visit Information:  PT Visit Information  Onset Date: 19  PT Insurance Information: Aetna Medicare  Total # of Visits to Date: 11  Plan of Care/Certification Expiration Date: 19  No Show: 0  Canceled Appointment: 0  Progress Note Counter: , PN due 19    Subjective: Pt reports \"I have not had pain at all\"     HEP Compliance:  [x] Good [] Fair [] Poor [] Reports not doing due to:    Vital Signs  Patient Currently in Pain: No   Pain Screening  Patient Currently in Pain: No  Pain Assessment  Pain Assessment: 0-10  Pain Level: 0  Clinical Progression: Gradually improving  Functional Pain Assessment: Activities are not prevented    OBJECTIVE:   Exercises  Exercise 2: iso bridges 3 ways 10\"x10 (w/o pain)   Exercise 3:  roll for contorl 10-10-10 x 10  Exercise 4:  standing hip abdd- abd 10s x 10 ea  Exercise 5: PFM lift :3 s hold 3 s rest  x 30 in H/L   Exercise 6: Prone on elbows x 3 min   Exercise 7: Press ups x10   Exercise 8: standing Lx extension 2x 10  Exercise 11: Plie squats 5 s inhale down, 5 s exhale ascend , 5 s rest w/ focus on maintaining PF/TA iso x10         Manual:   Manual therapy  Muscle energy: grossly (=) mobility og B SIJs w/ pelvic and sacral symmetry palpated and observed this date    Modalities:  Modalities  Moist heat: 10 mins to Lumbo-sacral in prone     *Indicates exercise, modality, or manual techniques to be initiated when appropriate    Assessment: Body structures, Functions, Activity limitations: Decreased functional mobility , Decreased ROM, Increased Pain  Assessment: Pt observed to have good maintenance of pelvic/sacral symmetry since last visit. Cont'd w/ deep core stabilizations and prone exs w/ good loreto.  Pt reports dec frequency and levels of pain ranging from 0-1/10 at most within the past week. Treatment Diagnosis: sacroiliac pain; Impaired strength. Prognosis: Excellent       Goals:  Short term goals  Time Frame for Short term goals: 1-2 weeks  Short term goal 1: Good knowledge HEP    Long term goals  Time Frame for Long term goals : 6-8 weeks  Long term goal 1: Pt to demo grossly (=) mobility of B SIJs w/ pelvic/sacral symmetry  Long term goal 2: Pain no greater than 1-2/10 with ADL  Long term goal 3: Good understanding proper body mechanics  Long term goal 4: Hip extension and HS strength to 4+/5 for improvement with ADL  Progress toward goals: goal 1 2 met    POST-PAIN       Pain Rating (0-10 pain scale):   0/10   Location and pain description same as pre-treatment unless indicated. Action: [] NA   [x] Perform HEP  [] Meds as prescribed  [x] Modalities as prescribed   [] Call Physician      Frequency/Duration:  Plan  Times per week: 2 X week  Plan weeks: 3-4 weeks added   Current Treatment Recommendations: Home Exercise Program, Integrated Dry Needling, Modalities, Pain Management, Aquatics, Patient/Caregiver Education & Training, Manual Therapy - Joint Manipulation, Strengthening, Gait Training     Pt to continue current HEP. See objective section for any therapeutic exercise changes, additions or modifications this date.     PT Individual Minutes  Time In: 0845  Time Out: 8859  Minutes: 35  Timed Code Treatment Minutes: 25 Minutes  Procedure Minutes:10    Signature:  Electronically signed by Joel Agosto PT on 5/30/19 at 8:53 AM

## 2019-06-04 ENCOUNTER — HOSPITAL ENCOUNTER (OUTPATIENT)
Dept: PHYSICAL THERAPY | Age: 74
Setting detail: THERAPIES SERIES
Discharge: HOME | End: 2019-06-04
Payer: MEDICARE

## 2019-06-04 PROCEDURE — 97110 THERAPEUTIC EXERCISES: CPT

## 2019-06-04 NOTE — PROGRESS NOTES
strength. Prognosis: Excellent     Goals:  Short term goals  Time Frame for Short term goals: 1-2 weeks  Short term goal 1: Good knowledge HEP  Long term goals  Time Frame for Long term goals : 6-8 weeks  Long term goal 1: Pt to demo grossly (=) mobility of B SIJs w/ pelvic/sacral symmetry  Long term goal 2: Pain no greater than 1-2/10 with ADL  Long term goal 3: Good understanding proper body mechanics  Long term goal 4: Hip extension and HS strength to 4+/5 for improvement with ADL  Progress toward goals: Maintenance of pelvic symmetry, B LE/core strengthening     POST-PAIN       Pain Rating (0-10 pain scale):   0/10   Location and pain description same as pre-treatment unless indicated. Action: [] NA   [] Perform HEP  [] Meds as prescribed  [] Modalities as prescribed   [] Call Physician     Frequency/Duration:  Plan  Times per week: 2 X week  Plan weeks: 3-4 weeks added   Specific instructions for Next Treatment: Progress HEP NV. Current Treatment Recommendations: Home Exercise Program, Integrated Dry Needling, Modalities, Pain Management, Aquatics, Patient/Caregiver Education & Training, Manual Therapy - Joint Manipulation, Strengthening, Gait Training     Pt to continue current HEP. See objective section for any therapeutic exercise changes, additions or modifications this date.     PT Individual Minutes  Time In: 1284  Time Out: 1011  Minutes: 49  Timed Code Treatment Minutes: 39 Minutes  Procedure Minutes: 10 min     Signature:  Electronically signed by Marciano Christian PTA on 6/4/19 at 10:53 AM

## 2019-06-11 ENCOUNTER — HOSPITAL ENCOUNTER (OUTPATIENT)
Dept: PHYSICAL THERAPY | Age: 74
Setting detail: THERAPIES SERIES
Discharge: HOME | End: 2019-06-11
Payer: MEDICARE

## 2019-06-11 PROCEDURE — 97110 THERAPEUTIC EXERCISES: CPT

## 2019-06-11 NOTE — DISCHARGE SUMMARY
Antonia grace, [address]     Ph: [phone]  Fax: [phone]    [] Certification  [] Recertification []  Plan of Care  [] Progress Note [x] Discharge      To:  Referring Practitioner: Junie Miller      From:  Sarah Headley PT  Patient: Rossy Causey     : 1945  Diagnosis: Sacrococcygeal disorder     Date: 2019  Treatment Diagnosis: sacroiliac pain; Impaired strength. Plan of Care/Certification Expiration Date: 19  Progress Report Period from:  2019  to 2019    Total # of Visits to Date: 13   No Show: 1    Canceled Appointment: 0     OBJECTIVE:   Long Term Goals - Time Frame for Long term goals : 6-8 weeks  Goals Current/ Discharge status Met   Long term goal 1: Pt to demo grossly (=) mobility of B SIJs w/ pelvic/sacral symmetry grossly (=) mobility of B SIJs w/ pelvic and sacral symmetry palpated and observed >2 wks [x] yes  [] no   Long term goal 2: Pain no greater than 1-2/10 with ADL 0/10 pain reported  [x] yes  [] no   Long term goal 3: Good understanding proper body mechanics Good  [] yes  [] no   Long term goal 4: Hip extension and HS strength to 4+/5 for improvement with ADL Strength RLE  Comment: knee ext 5/5, knee flex 5/5, hip ER 4+/5, hip IR 5/5 ; hip flex 5/5, hip ABD 5/5, hip ext 4+/5   Strength LLE  Strength LLE: WNL [x] yes  [] no       Body structures, Functions, Activity limitations: Decreased functional mobility , Decreased ROM, Increased Pain  Assessment: Pt has met all goals and reports feeling 100% normal function ; Pt agreeable to D/C w/ HEP at this time. PLAN:D/C  Plan  Plan Comment: D/C this date. ? Patient Status:[] Continue/ Initiate plan of Care    [x] Discharge PT. Recommend pt continue with HEP.      [] Additional visits requested, Please re-certify for additional visits:          Signature: Objective measures taken by: Electronically signed by Rey Orta PTA on 6/11/19 at 9:33 AM    Electronically signed by Sarah Headley PT on 6/11/2019 at 12:46 PM  If you have any questions or concerns, please don't hesitate to call. Thank you for your referral.    I have reviewed this plan of care and certify a need for medically necessary rehabilitation services.     Physician Signature:__________________________________________________________  Date:  Please sign and return

## 2019-06-11 NOTE — PROGRESS NOTES
[address] [address]  Outpatient Physical Therapy    Treatment Note        Date: 2019  Patient: Haven Helms  : 1945  ACCT #: [de-identified]  Referring Practitioner: Velvet Robertson  Diagnosis: Sacrococcygeal disorder    Visit Information:  PT Visit Information  Onset Date: 19  PT Insurance Information: Aetna Medicare  Total # of Visits to Date: 15  Plan of Care/Certification Expiration Date: 19  No Show: 1  Canceled Appointment: 0  Progress Note Counter: , PN due 19    Subjective: Pt reports being pain free going on >3 weeks w/ exception of sudden 8/10 pain in Rt SI region while walking this weekend stating \"It came and then went. \" Pt reports no pain since. HEP Compliance:  [x] Good [] Fair [] Poor [] Reports not doing due to:    Vital Signs  Patient Currently in Pain: Denies   Pain Screening  Patient Currently in Pain: Denies    OBJECTIVE:   Exercises  Exercise 1: MMT 5 min   Exercise 6: Prone on elbows x 2 min   Exercise 7: Press ups x10   Exercise 9: DLS 2 ways M61, TA walk out x15, TA alt ER x10   Exercise 12: Start in 90/90 position, pt lowers knees while maintaining neutral back (w/ focus on TA iso) x15   Exercise 20: HEP: bent knee fallouts       Strength: [] NT  [x] MMT completed:  Strength RLE  Comment: knee ext 4/5, knee flex 5/5, hip ER 4+/5, hip IR 5/5 ; hip flex 5/5, hip ABD 5/5, hip ext 4+/5   Strength LLE  Strength LLE: WNL      ROM: [x] NT  [] ROM measurements:      Manual:   Manual therapy  Muscle energy: grossly (=) mobility og B SIJs w/ pelvic and sacral symmetry palpated and observed this date    Modalities:  Modalities  Moist heat: 10 mins to Lumbo-sacral in prone     *Indicates exercise, modality, or manual techniques to be initiated when appropriate    Assessment: Body structures, Functions, Activity limitations: Decreased functional mobility , Decreased ROM, Increased Pain  Assessment: Pt reports feeling 100% normal function currently upon D/C.  Pt denies having any pain w/ daily activities including household chores and yardwork. Pt denies having pain >3 weeks w/ exception of brief pain in Rt SIJ  upon walking this weekend. Pt has maintained good pelvic/sacral symmetry >2 wks and demo's good PF/abdominal stabilization w/ ex. All LTG's met upon assessment therefore D/C necessary at this time. Pt provided w/ update HEP and plans to cont indep. Treatment Diagnosis: sacroiliac pain; Impaired strength. Prognosis: Excellent     Goals:  Short term goals  Time Frame for Short term goals: 1-2 weeks  Short term goal 1: Good knowledge HEP  Long term goals  Time Frame for Long term goals : 6-8 weeks  Long term goal 1: Pt to demo grossly (=) mobility of B SIJs w/ pelvic/sacral symmetry  Long term goal 2: Pain no greater than 1-2/10 with ADL  Long term goal 3: Good understanding proper body mechanics  Long term goal 4: Hip extension and HS strength to 4+/5 for improvement with ADL  Progress toward goals: Please refer to D/C note for details. POST-PAIN       Pain Rating (0-10 pain scale):   0/10   Location and pain description same as pre-treatment unless indicated. Action: [x] NA   [] Perform HEP  [] Meds as prescribed  [] Modalities as prescribed   [] Call Physician     Frequency/Duration:  Plan  Plan Comment: D/C this date. Pt to continue current HEP. See objective section for any therapeutic exercise changes, additions or modifications this date.      PT Individual Minutes  Time In: 2977  Time Out: 6100  Minutes: 28  Timed Code Treatment Minutes: 28 Minutes  Procedure Minutes: N/A    Signature:  Electronically signed by Sol Winters PTA on 6/11/19 at 9:32 AM

## 2019-06-13 ENCOUNTER — APPOINTMENT (OUTPATIENT)
Dept: PHYSICAL THERAPY | Age: 74
End: 2019-06-13
Payer: MEDICARE

## 2019-07-19 DIAGNOSIS — Z01.818 PRE-OP EXAM: ICD-10-CM

## 2019-08-02 ENCOUNTER — OFFICE VISIT (OUTPATIENT)
Dept: FAMILY MEDICINE CLINIC | Age: 74
End: 2019-08-02
Payer: MEDICARE

## 2019-08-02 ENCOUNTER — HOSPITAL ENCOUNTER (OUTPATIENT)
Dept: WOMENS IMAGING | Age: 74
Discharge: HOME | End: 2019-08-04
Payer: MEDICARE

## 2019-08-02 VITALS
DIASTOLIC BLOOD PRESSURE: 62 MMHG | TEMPERATURE: 97.3 F | OXYGEN SATURATION: 98 % | HEART RATE: 53 BPM | SYSTOLIC BLOOD PRESSURE: 138 MMHG | BODY MASS INDEX: 27.05 KG/M2 | HEIGHT: 62 IN | WEIGHT: 147 LBS

## 2019-08-02 DIAGNOSIS — K64.1 GRADE II INTERNAL HEMORRHOIDS: Primary | ICD-10-CM

## 2019-08-02 DIAGNOSIS — D12.2 ADENOMATOUS POLYP OF ASCENDING COLON: ICD-10-CM

## 2019-08-02 DIAGNOSIS — L56.5 DISSEMINATED SUPERFICIAL ACTINIC POROKERATOSIS: ICD-10-CM

## 2019-08-02 DIAGNOSIS — Z12.31 ENCOUNTER FOR SCREENING MAMMOGRAM FOR BREAST CANCER: ICD-10-CM

## 2019-08-02 DIAGNOSIS — Z00.00 ROUTINE GENERAL MEDICAL EXAMINATION AT A HEALTH CARE FACILITY: ICD-10-CM

## 2019-08-02 PROCEDURE — G0439 PPPS, SUBSEQ VISIT: HCPCS | Performed by: FAMILY MEDICINE

## 2019-08-02 PROCEDURE — 99214 OFFICE O/P EST MOD 30 MIN: CPT | Performed by: FAMILY MEDICINE

## 2019-08-02 PROCEDURE — 77063 BREAST TOMOSYNTHESIS BI: CPT

## 2019-08-02 ASSESSMENT — PATIENT HEALTH QUESTIONNAIRE - PHQ9
SUM OF ALL RESPONSES TO PHQ QUESTIONS 1-9: 0
SUM OF ALL RESPONSES TO PHQ QUESTIONS 1-9: 0

## 2019-08-02 NOTE — PROGRESS NOTES
to the anal area. She will put approximately half inch of the medication just inside the rectal ring to help shrink the internal hemorrhoid irritation. Prior colonoscopy revealed only one actual polyp. Her follow-up is due 2021. The internal hemorrhoids were confirmed at that time. She will continue current skin regimen and will follow-up in 3 months to discuss possible chemical treatments of the porokeratosis of her legs. Personalized Preventive Plan for Marcelina Pradhan - 8/2/2019  Medicare offers a range of preventive health benefits. Some of the tests and screenings are paid in full while other may be subject to a deductible, co-insurance, and/or copay. Some of these benefits include a comprehensive review of your medical history including lifestyle, illnesses that may run in your family, and various assessments and screenings as appropriate. After reviewing your medical record and screening and assessments performed today your provider may have ordered immunizations, labs, imaging, and/or referrals for you. A list of these orders (if applicable) as well as your Preventive Care list are included within your After Visit Summary for your review. Other Preventive Recommendations:    · A preventive eye exam performed by an eye specialist is recommended every 1-2 years to screen for glaucoma; cataracts, macular degeneration, and other eye disorders. · A preventive dental visit is recommended every 6 months. · Try to get at least 150 minutes of exercise per week or 10,000 steps per day on a pedometer . · Order or download the FREE \"Exercise & Physical Activity: Your Everyday Guide\" from [employer]. Call 7-591.344.6988 or search [employer] online. · You need 2497-6748 mg of calcium and 0562-9182 IU of vitamin D per day.  It is possible to meet your calcium requirement with diet alone, but a vitamin D supplement is usually necessary to meet this goal.  · When exposed to the sun, use a sunscreen that protects against both UVA and UVB radiation with an SPF of 30 or greater. Reapply every 2 to 3 hours or after sweating, drying off with a towel, or swimming. · Always wear a seat belt when traveling in a car. Always wear a helmet when riding a bicycle or motorcycle. Garett Hurt M.D. Medicare Annual Wellness Visit  Name: Mindy De La Cruz Date: 2019   MRN: [id number] Sex: Female   Age: 68 y.o. Ethnicity: Non-/Non    : 1945 Race: Tania Blanton is here for Hemorrhoids (x 2 years pt states that she had blood in her stool  pt states that she has completed her colonoscopy. ) and Medicare AWV    Screenings for behavioral, psychosocial and functional/safety risks, and cognitive dysfunction are all negative except as indicated below. These results, as well as other patient data from the [employer] [address] form, are documented in Flowsheets linked to this Encounter. No Known Allergiesic  Prior to Visit Medications    Medication Sig Taking? Authorizing Provider   estradiol (ESTRACE VAGINAL) 0.1 MG/GM vaginal cream Place 1 g vaginally daily Yes Sakina Turner MD   carvedilol (COREG) 6.25 MG tablet Take 1 tablet by mouth 2 times daily Yes Anthony Jarrell MD   fenofibrate 160 MG tablet TAKE 1 TABLET DAILY  Patient taking differently: Indications: 1/2 tablet daily  Yes Anthony Jarrell MD   acyclovir (ZOVIRAX) 5 % ointment Apply 6 times per day for 7 day Yes Anthony Jarrell MD   aspirin 81 MG EC tablet Take 81 mg by mouth daily. Yes Historical Provider, MD   Multiple Vitamins-Minerals (MULTIPLE VITAMINS/WOMENS PO) Take  by mouth daily. Yes Historical Provider, MD   ations    Medication Sig Taking?  Authorizing Provider   estradiol (ESTRACE VAGINAL) 0.1 MG/GM vaginal cream Place 1 g vaginally daily Yes Sakina Turner MD   carvedilol (COREG) 6.25 MG tablet Take 1 tablet by mouth 2 times daily Yes Anthony Jarrell MD fenofibrate 160 MG tablet TAKE 1 TABLET DAILY  Patient taking differently: Indications: 1/2 tablet daily  Yes Enriqueta Vazquez MD   acyclovir (ZOVIRAX) 5 % ointment Apply 6 times per day for 7 day Yes Enriqueta Vazquez MD   aspirin 81 MG EC tablet Take 81 mg by mouth daily. Yes Historical Provider, MD   Multiple Vitamins-Minerals (MULTIPLE VITAMINS/WOMENS PO) Take  by mouth daily.    Yes Historical Provider, MD      Diagnosis Date    Abnormal EKG     BPPV (benign paroxysmal positional vertigo)     Colon polyps 09/2016    needs f/u in 5 years    Elevated liver enzymes 11/5/2013    Family history of heart disease     Female bladder prolapse     Heart murmur     History of bone density study 4/7/11    History of mammography, diagnostic 4/13/11    Category 3 Left mammogram/ Category 2 Right mammogram    Hyperlipidemia     on meds > 10 yrs    Hypertension     on meds > 10 yrs    Internal hemorrhoids     Normal cardiac stress test 12/09/2015    Osteoarthritis, multiple sites     shoulders     Osteopenia 06/09/2015    -1.4; -1.5 9/2018    Recurrent cold sores     Rheumatic fever     Trigger thumb of left hand      Past Surgical History:   Procedure Laterality Date    COLONOSCOPY  7/12/2006    COLONOSCOPY  09/12/2016    EN MYERS MD    HYSTERECTOMY VAGINAL N/A 2/14/2019    [facility] BSO performed by Jeanne Robertson MD at [address] Left 5/9/2017    LEFT HAND  FINGER TRIGGER RELEASE THUMB, SUPINE  performed by Jn Edwards MD at [address] [facility] N/A 2/14/2019    USLS POSSIBLE SSLS performed by Jeanne Robertson MD at [address] [facility] N/A 2/14/2019    A-P REPAIR performed by Jeanne Robertson MD at [address] [place] [address] History   Problem Relation Age of Onset    Asthma Father     Diabetes Father     Heart Disease Mother     Diabetes Brother     Heart Disease Brother         heart surgery    Kidney Disease Brother     Other Brother         psoriasis   

## 2019-08-02 NOTE — PATIENT INSTRUCTIONS
supplement is usually necessary to meet this goal.  · When exposed to the sun, use a sunscreen that protects against both UVA and UVB radiation with an SPF of 30 or greater. Reapply every 2 to 3 hours or after sweating, drying off with a towel, or swimming. · Always wear a seat belt when traveling in a car. Always wear a helmet when riding a bicycle or motorcycle.

## 2019-08-06 ASSESSMENT — ENCOUNTER SYMPTOMS
NAUSEA: 0
DIARRHEA: 0
BLOOD IN STOOL: 1
RECTAL PAIN: 0
VOICE CHANGE: 0
TROUBLE SWALLOWING: 0
ANAL BLEEDING: 1
SHORTNESS OF BREATH: 0
ABDOMINAL DISTENTION: 0
COUGH: 0
EYE DISCHARGE: 0
COLOR CHANGE: 0
CONSTIPATION: 0
ABDOMINAL PAIN: 0
VOMITING: 0

## 2019-10-14 DIAGNOSIS — E78.5 HYPERLIPIDEMIA, UNSPECIFIED HYPERLIPIDEMIA TYPE: ICD-10-CM

## 2019-10-14 DIAGNOSIS — I10 ESSENTIAL HYPERTENSION: ICD-10-CM

## 2019-10-14 RX ORDER — CARVEDILOL 6.25 MG/1
6.25 TABLET ORAL 2 TIMES DAILY
Qty: 180 TABLET | Refills: 3 | Status: SHIPPED | OUTPATIENT
Start: 2019-10-14 | End: 2020-11-02

## 2019-10-14 RX ORDER — FENOFIBRATE 160 MG/1
TABLET ORAL
Qty: 90 TABLET | Refills: 3 | Status: SHIPPED | OUTPATIENT
Start: 2019-10-14 | End: 2020-11-02

## 2019-11-13 ENCOUNTER — OFFICE VISIT (OUTPATIENT)
Dept: FAMILY MEDICINE CLINIC | Age: 74
End: 2019-11-13
Payer: MEDICARE

## 2019-11-13 VITALS
OXYGEN SATURATION: 98 % | HEART RATE: 56 BPM | BODY MASS INDEX: 26.83 KG/M2 | SYSTOLIC BLOOD PRESSURE: 108 MMHG | TEMPERATURE: 96.6 F | WEIGHT: 145.8 LBS | DIASTOLIC BLOOD PRESSURE: 62 MMHG | HEIGHT: 62 IN

## 2019-11-13 DIAGNOSIS — R73.09 ELEVATED GLUCOSE: ICD-10-CM

## 2019-11-13 DIAGNOSIS — L56.5 DISSEMINATED SUPERFICIAL ACTINIC POROKERATOSIS: ICD-10-CM

## 2019-11-13 DIAGNOSIS — E78.2 MIXED HYPERLIPIDEMIA: ICD-10-CM

## 2019-11-13 DIAGNOSIS — I10 ESSENTIAL HYPERTENSION: ICD-10-CM

## 2019-11-13 DIAGNOSIS — L57.0 ACTINIC KERATOSIS: Primary | ICD-10-CM

## 2019-11-13 PROCEDURE — 99213 OFFICE O/P EST LOW 20 MIN: CPT | Performed by: FAMILY MEDICINE

## 2019-11-13 RX ORDER — FLUOROURACIL 50 MG/G
CREAM TOPICAL
Qty: 40 G | Refills: 0 | Status: SHIPPED | OUTPATIENT
Start: 2019-11-13 | End: 2021-01-26 | Stop reason: ALTCHOICE

## 2019-11-14 ASSESSMENT — ENCOUNTER SYMPTOMS
TROUBLE SWALLOWING: 0
ABDOMINAL PAIN: 0
DIARRHEA: 0
VOICE CHANGE: 0
SHORTNESS OF BREATH: 0
EYE DISCHARGE: 0
ABDOMINAL DISTENTION: 0
COLOR CHANGE: 0
CONSTIPATION: 0
COUGH: 0
NAUSEA: 0

## 2019-11-15 DIAGNOSIS — I10 ESSENTIAL HYPERTENSION: ICD-10-CM

## 2019-11-15 DIAGNOSIS — E78.2 MIXED HYPERLIPIDEMIA: ICD-10-CM

## 2019-11-15 DIAGNOSIS — R73.09 ELEVATED GLUCOSE: ICD-10-CM

## 2019-11-15 LAB
ALBUMIN SERPL-MCNC: 4.6 G/DL (ref 3.5–4.6)
ALP BLD-CCNC: 70 U/L (ref 40–130)
ALT SERPL-CCNC: 26 U/L (ref 0–33)
ANION GAP SERPL CALCULATED.3IONS-SCNC: 14 MEQ/L (ref 9–15)
AST SERPL-CCNC: 26 U/L (ref 0–35)
BASOPHILS ABSOLUTE: 0.1 K/UL (ref 0–0.2)
BASOPHILS RELATIVE PERCENT: 0.8 %
BILIRUB SERPL-MCNC: 0.5 MG/DL (ref 0.2–0.7)
BUN BLDV-MCNC: 15 MG/DL (ref 8–23)
CALCIUM SERPL-MCNC: 10.3 MG/DL (ref 8.5–9.9)
CHLORIDE BLD-SCNC: 104 MEQ/L (ref 95–107)
CHOLESTEROL, FASTING: 169 MG/DL (ref 0–199)
CO2: 27 MEQ/L (ref 20–31)
CREAT SERPL-MCNC: 0.73 MG/DL (ref 0.5–0.9)
EOSINOPHILS ABSOLUTE: 0.1 K/UL (ref 0–0.7)
EOSINOPHILS RELATIVE PERCENT: 2.1 %
GFR AFRICAN AMERICAN: >60
GFR NON-AFRICAN AMERICAN: >60
GLOBULIN: 3 G/DL (ref 2.3–3.5)
GLUCOSE BLD-MCNC: 81 MG/DL (ref 70–99)
HBA1C MFR BLD: 6.1 % (ref 4.8–5.9)
HCT VFR BLD CALC: 49.1 % (ref 37–47)
HDLC SERPL-MCNC: 39 MG/DL (ref 40–59)
HEMOGLOBIN: 15.9 G/DL (ref 12–16)
LDL CHOLESTEROL CALCULATED: 103 MG/DL (ref 0–129)
LYMPHOCYTES ABSOLUTE: 2.3 K/UL (ref 1–4.8)
LYMPHOCYTES RELATIVE PERCENT: 36.5 %
MCH RBC QN AUTO: 29.5 PG (ref 27–31.3)
MCHC RBC AUTO-ENTMCNC: 32.3 % (ref 33–37)
MCV RBC AUTO: 91.4 FL (ref 82–100)
MONOCYTES ABSOLUTE: 0.6 K/UL (ref 0.2–0.8)
MONOCYTES RELATIVE PERCENT: 8.6 %
NEUTROPHILS ABSOLUTE: 3.3 K/UL (ref 1.4–6.5)
NEUTROPHILS RELATIVE PERCENT: 52 %
PDW BLD-RTO: 14.2 % (ref 11.5–14.5)
PLATELET # BLD: 230 K/UL (ref 130–400)
POTASSIUM SERPL-SCNC: 4.9 MEQ/L (ref 3.4–4.9)
RBC # BLD: 5.37 M/UL (ref 4.2–5.4)
SODIUM BLD-SCNC: 145 MEQ/L (ref 135–144)
T4 FREE: 1.24 NG/DL (ref 0.84–1.68)
TOTAL PROTEIN: 7.6 G/DL (ref 6.3–8)
TRIGLYCERIDE, FASTING: 134 MG/DL (ref 0–150)
TSH REFLEX: 5.55 UIU/ML (ref 0.44–3.86)
WBC # BLD: 6.4 K/UL (ref 4.8–10.8)

## 2019-12-04 ENCOUNTER — TELEPHONE (OUTPATIENT)
Dept: FAMILY MEDICINE CLINIC | Age: 74
End: 2019-12-04

## 2019-12-04 DIAGNOSIS — E03.9 HYPOTHYROIDISM, UNSPECIFIED TYPE: ICD-10-CM

## 2019-12-04 DIAGNOSIS — E03.9 HYPOTHYROIDISM, UNSPECIFIED TYPE: Primary | ICD-10-CM

## 2019-12-04 RX ORDER — LEVOTHYROXINE SODIUM 0.03 MG/1
25 TABLET ORAL DAILY
Qty: 30 TABLET | Refills: 1 | Status: SHIPPED | OUTPATIENT
Start: 2019-12-04 | End: 2019-12-06 | Stop reason: SDUPTHER

## 2019-12-06 RX ORDER — LEVOTHYROXINE SODIUM 0.03 MG/1
25 TABLET ORAL DAILY
Qty: 30 TABLET | Refills: 1 | OUTPATIENT
Start: 2019-12-06 | End: 2020-01-21 | Stop reason: DRUGHIGH

## 2020-01-08 ENCOUNTER — TELEPHONE (OUTPATIENT)
Dept: FAMILY MEDICINE CLINIC | Age: 75
End: 2020-01-08

## 2020-01-16 DIAGNOSIS — E03.9 HYPOTHYROIDISM, UNSPECIFIED TYPE: ICD-10-CM

## 2020-01-16 LAB
T4 FREE: 1.22 NG/DL (ref 0.84–1.68)
TSH REFLEX: 5.93 UIU/ML (ref 0.44–3.86)

## 2020-01-21 ENCOUNTER — OFFICE VISIT (OUTPATIENT)
Dept: FAMILY MEDICINE CLINIC | Age: 75
End: 2020-01-21
Payer: MEDICARE

## 2020-01-21 VITALS
HEART RATE: 84 BPM | HEIGHT: 62 IN | WEIGHT: 147.2 LBS | OXYGEN SATURATION: 99 % | DIASTOLIC BLOOD PRESSURE: 60 MMHG | SYSTOLIC BLOOD PRESSURE: 132 MMHG | BODY MASS INDEX: 27.09 KG/M2 | TEMPERATURE: 99.6 F

## 2020-01-21 PROCEDURE — 99214 OFFICE O/P EST MOD 30 MIN: CPT | Performed by: FAMILY MEDICINE

## 2020-01-21 RX ORDER — LEVOTHYROXINE SODIUM 0.05 MG/1
50 TABLET ORAL DAILY
Qty: 90 TABLET | Refills: 3 | Status: SHIPPED | OUTPATIENT
Start: 2020-01-21 | End: 2020-11-29

## 2020-01-21 SDOH — ECONOMIC STABILITY: TRANSPORTATION INSECURITY
IN THE PAST 12 MONTHS, HAS THE LACK OF TRANSPORTATION KEPT YOU FROM MEDICAL APPOINTMENTS OR FROM GETTING MEDICATIONS?: NO

## 2020-01-21 SDOH — ECONOMIC STABILITY: FOOD INSECURITY: WITHIN THE PAST 12 MONTHS, THE FOOD YOU BOUGHT JUST DIDN'T LAST AND YOU DIDN'T HAVE MONEY TO GET MORE.: NEVER TRUE

## 2020-01-21 SDOH — ECONOMIC STABILITY: INCOME INSECURITY: HOW HARD IS IT FOR YOU TO PAY FOR THE VERY BASICS LIKE FOOD, HOUSING, MEDICAL CARE, AND HEATING?: NOT HARD AT ALL

## 2020-01-21 SDOH — ECONOMIC STABILITY: TRANSPORTATION INSECURITY
IN THE PAST 12 MONTHS, HAS LACK OF TRANSPORTATION KEPT YOU FROM MEETINGS, WORK, OR FROM GETTING THINGS NEEDED FOR DAILY LIVING?: NO

## 2020-01-21 SDOH — ECONOMIC STABILITY: FOOD INSECURITY: WITHIN THE PAST 12 MONTHS, YOU WORRIED THAT YOUR FOOD WOULD RUN OUT BEFORE YOU GOT MONEY TO BUY MORE.: NEVER TRUE

## 2020-01-21 ASSESSMENT — ENCOUNTER SYMPTOMS
ABDOMINAL PAIN: 0
CONSTIPATION: 0
VOICE CHANGE: 0
ABDOMINAL DISTENTION: 0
EYE DISCHARGE: 0
NAUSEA: 0
TROUBLE SWALLOWING: 0
COLOR CHANGE: 0
COUGH: 0
DIARRHEA: 0
SHORTNESS OF BREATH: 0

## 2020-01-21 ASSESSMENT — PATIENT HEALTH QUESTIONNAIRE - PHQ9
2. FEELING DOWN, DEPRESSED OR HOPELESS: 0
SUM OF ALL RESPONSES TO PHQ QUESTIONS 1-9: 0
SUM OF ALL RESPONSES TO PHQ9 QUESTIONS 1 & 2: 0
SUM OF ALL RESPONSES TO PHQ QUESTIONS 1-9: 0
1. LITTLE INTEREST OR PLEASURE IN DOING THINGS: 0

## 2020-01-21 NOTE — PROGRESS NOTES
yrs    Internal hemorrhoids     Normal cardiac stress test 12/09/2015    Osteoarthritis, multiple sites     shoulders     Osteopenia 06/09/2015    -1.4; -1.5 9/2018    Recurrent cold sores     Rheumatic fever     Trigger thumb of left hand      Past Surgical History:   Procedure Laterality Date    COLONOSCOPY  7/12/2006    COLONOSCOPY  09/12/2016    EN MYERS MD    HYSTERECTOMY VAGINAL N/A 2/14/2019    [facility] BSO performed by Brandan Mccrary MD at [address] Left 5/9/2017    LEFT HAND  FINGER TRIGGER RELEASE THUMB, SUPINE  performed by Derick Espinoza MD at [address] [facility] N/A 2/14/2019    USLS POSSIBLE SSLS performed by Brandan Mccrary MD at [address] [facility] N/A 2/14/2019    A-P REPAIR performed by Brandan Mccrary MD at [address] History     Socioeconomic History    Marital status:      Spouse name: Not on file    Number of children: 2    Years of education: Not on file    Highest education level: Not on file   Occupational History    Occupation: retired   Social Needs    Financial resource strain: Not hard at all   Luis M-Crystal insecurity:     Worry: Never true     Inability: Never true    Transportation needs:     Medical: No     Non-medical: No   Tobacco Use    Smoking status: Former Smoker     Packs/day: 0.50     Years: 20.00     Pack years: 10.00     Types: Cigarettes     Last attempt to quit: 8/13/2004     Years since quitting: 15.4    Smokeless tobacco: Never Used    Tobacco comment: quit in her 42's   Substance and Sexual Activity    Alcohol use:  Yes     Alcohol/week: 0.0 standard drinks     Comment: wine 2 glasses per week    Drug use: No    Sexual activity: Not on file   Lifestyle    Physical activity:     Days per week: Not on file     Minutes per session: Not on file    Stress: Not on file   Relationships    Social connections:     Talks on phone: Not on file     Gets together: Not on file     Attends [employer] service: Not on file     Active member of club or organization: Not on file     Attends meetings of clubs or organizations: Not on file     Relationship status: Not on file    Intimate partner violence:     Fear of current or ex partner: Not on file     Emotionally abused: Not on file     Physically abused: Not on file     Forced sexual activity: Not on file   Other Topics Concern    Not on file   Social History Narrative    Has children that live in [place]     Family History   Problem Relation Age of Onset    Asthma Father     Diabetes Father     Heart Disease Mother     Diabetes Brother     Heart Disease Brother         heart surgery    Kidney Disease Brother     Other Brother         psoriasis    Other Daughter         shoulder surgery, gum surgeries    Asthma Daughter         multiple allergies    Arthritis Sister     High Cholesterol Sister      Allergies:  Patient has no known allergies. Review of Systems   Constitutional: Negative for activity change, appetite change, fatigue and unexpected weight change. HENT: Negative for congestion, dental problem, trouble swallowing and voice change. Eyes: Negative for discharge and visual disturbance. Respiratory: Negative for cough and shortness of breath. Cardiovascular: Negative for chest pain. Gastrointestinal: Negative for abdominal distention, abdominal pain, constipation, diarrhea and nausea. Endocrine: Negative for polydipsia and polyuria. Genitourinary: Negative for dysuria and urgency. Musculoskeletal: Negative for gait problem and joint swelling. Skin: Negative for color change and rash. Allergic/Immunologic: Negative for environmental allergies and food allergies. Neurological: Negative for speech difficulty and weakness. Hematological: Does not bruise/bleed easily. Psychiatric/Behavioral: Negative for agitation and behavioral problems.        Objective:   /60   Pulse 84   Temp 99.6 °F (37.6 °C)   Ht 5' 2\" (1.575 m)   Wt 147 lb 3.2 oz (66.8 kg)   SpO2 99%   BMI 26.92 kg/m²     Physical Exam  Constitutional:       General: She is not in acute distress. Appearance: She is well-developed. She is not diaphoretic. HENT:      Head: Normocephalic and atraumatic. Right Ear: External ear normal.      Left Ear: External ear normal.      Nose: Nose normal.   Eyes:      General:         Right eye: No discharge. Left eye: No discharge. Conjunctiva/sclera: Conjunctivae normal.      Pupils: Pupils are equal, round, and reactive to light. Neck:      Musculoskeletal: Neck supple. Thyroid: No thyroid mass, thyromegaly or thyroid tenderness. Trachea: No tracheal deviation. Cardiovascular:      Rate and Rhythm: Normal rate and regular rhythm. Pulmonary:      Effort: Pulmonary effort is normal. No respiratory distress. Abdominal:      General: There is no distension. Skin:     General: Skin is warm and dry. Findings: No bruising or rash. Comments: Bilateral forearms have large areas consistent with actinic keratoses. Neurological:      Mental Status: She is alert. Coordination: Coordination normal.   Psychiatric:         Thought Content: Thought content normal.         Judgment: Judgment normal.         No results found for this visit on 01/21/20.     Recent Results (from the past 2016 hour(s))   Comprehensive Metabolic Panel    Collection Time: 11/15/19  9:06 AM   Result Value Ref Range    Sodium 145 (H) 135 - 144 mEq/L    Potassium 4.9 3.4 - 4.9 mEq/L    Chloride 104 95 - 107 mEq/L    CO2 27 20 - 31 mEq/L    Anion Gap 14 9 - 15 mEq/L    Glucose 81 70 - 99 mg/dL    BUN 15 8 - 23 mg/dL    CREATININE 0.73 0.50 - 0.90 mg/dL    GFR Non-African American >60.0 >60    GFR  >60.0 >60    Calcium 10.3 (H) 8.5 - 9.9 mg/dL    Total Protein 7.6 6.3 - 8.0 g/dL    Alb 4.6 3.5 - 4.6 g/dL    Total Bilirubin 0.5 0.2 - 0.7 mg/dL    Alkaline Phosphatase 70 40 - 130 U/L    ALT 26 levothyroxine 2 tablets a day until gone. Then she will fill the new prescription for 50 mcg. TSH to be repeated in 4 weeks. We will check back on the hemoglobin A1c elevation after the patient's been therapeutic with her thyroid level for at least a month or 2. As this affects her metabolism. Patient will apply the Efudex to bilateral forearm rough areas consistent with AK twice a day for 2 weeks and then follow-up in 4 to 6 weeks for cryotherapy of any remaining lesions          Garett Moncada M.D.

## 2020-01-21 NOTE — PATIENT INSTRUCTIONS
Patient take the remaining 25 mcg levothyroxine 2 tablets a day until gone. Then she will fill the new prescription for 50 mcg. TSH to be repeated in 4 weeks. We will check back on the hemoglobin A1c elevation after the patient's been therapeutic with her thyroid level for at least a month or 2. As this affects her metabolism.     Patient will apply the Efudex to bilateral forearm rough areas consistent with AK twice a day for 2 weeks and then follow-up in 4 to 6 weeks for cryotherapy of any remaining lesions

## 2020-02-17 DIAGNOSIS — E03.9 HYPOTHYROIDISM, UNSPECIFIED TYPE: ICD-10-CM

## 2020-02-17 LAB — TSH REFLEX: 2.19 UIU/ML (ref 0.44–3.86)

## 2020-02-21 ENCOUNTER — TELEPHONE (OUTPATIENT)
Dept: FAMILY MEDICINE CLINIC | Age: 75
End: 2020-02-21

## 2020-02-21 NOTE — TELEPHONE ENCOUNTER
Pt is coming in next week to have spots on her skin frozen, and is going to [place] 2 days later. She isn't sure if she would be able to be in the sun, after the OV. If she can't be, she will more than likely rsch the appt so she can enjoy [place]. Please advise pt.

## 2020-03-13 ENCOUNTER — PROCEDURE VISIT (OUTPATIENT)
Dept: FAMILY MEDICINE CLINIC | Age: 75
End: 2020-03-13
Payer: MEDICARE

## 2020-03-13 VITALS
SYSTOLIC BLOOD PRESSURE: 122 MMHG | DIASTOLIC BLOOD PRESSURE: 66 MMHG | OXYGEN SATURATION: 98 % | HEART RATE: 55 BPM | WEIGHT: 145 LBS | TEMPERATURE: 97.3 F | HEIGHT: 62 IN | BODY MASS INDEX: 26.68 KG/M2

## 2020-03-13 PROCEDURE — 99214 OFFICE O/P EST MOD 30 MIN: CPT | Performed by: FAMILY MEDICINE

## 2020-03-13 NOTE — PATIENT INSTRUCTIONS
Refer to Dermatology for further opinion on accuracy of diagnosis as well as treatment options due to extensive body surface involvement. There did not seem to be much of a response to Efudex small area trials.    the legs and certain areas of the forearms might better respond to steroids as they appear more xerotic/eczematic

## 2020-03-13 NOTE — PROGRESS NOTES
wine 2 glasses per week    Drug use: No    Sexual activity: Not on file   Lifestyle    Physical activity     Days per week: Not on file     Minutes per session: Not on file    Stress: Not on file   Relationships    Social connections     Talks on phone: Not on file     Gets together: Not on file     Attends [employer] service: Not on file     Active member of club or organization: Not on file     Attends meetings of clubs or organizations: Not on file     Relationship status: Not on file    Intimate partner violence     Fear of current or ex partner: Not on file     Emotionally abused: Not on file     Physically abused: Not on file     Forced sexual activity: Not on file   Other Topics Concern    Not on file   Social History Narrative    Has children that live in [place]     Family History   Problem Relation Age of Onset    Asthma Father     Diabetes Father     Heart Disease Mother     Diabetes Brother     Heart Disease Brother         heart surgery    Kidney Disease Brother     Other Brother         psoriasis    Other Daughter         shoulder surgery, gum surgeries    Asthma Daughter         multiple allergies    Arthritis Sister     High Cholesterol Sister      Allergies:  Patient has no known allergies. Review of Systems   Constitutional: Negative for chills and fever. Allergic/Immunologic: Negative for environmental allergies, food allergies and immunocompromised state. Hematological: Negative for adenopathy. Does not bruise/bleed easily. Psychiatric/Behavioral: Negative for behavioral problems and sleep disturbance. Objective:   /66   Pulse 55   Temp 97.3 °F (36.3 °C)   Ht 5' 2\" (1.575 m)   Wt 145 lb (65.8 kg)   SpO2 98%   BMI 26.52 kg/m²     Physical Exam  Constitutional:       General: She is not in acute distress. Appearance: Normal appearance. She is well-developed. She is not toxic-appearing. HENT:      Head: Normocephalic and atraumatic.       Right Ear: Hearing and tympanic membrane normal.      Left Ear: Hearing and tympanic membrane normal.      Nose: Nose normal. No nasal deformity. Eyes:      General: Lids are normal.         Right eye: No discharge. Left eye: No discharge. Conjunctiva/sclera: Conjunctivae normal.      Pupils: Pupils are equal, round, and reactive to light. Neck:      Musculoskeletal: Full passive range of motion without pain. Thyroid: No thyroid mass or thyromegaly. Vascular: No JVD. Trachea: Trachea and phonation normal.   Cardiovascular:      Rate and Rhythm: Normal rate and regular rhythm. Pulmonary:      Effort: No accessory muscle usage or respiratory distress. Musculoskeletal:      Comments: FROM all large muscle groups and joints as witnessed when walking to exam table, getting on, and getting off the exam table. Skin:     General: Skin is warm and dry. Findings: No rash. Comments: Patient has a large amount of actinic keratosis on her forearms and a few on her legs. Also has large areas of the eczema and patches. Arms and legs. Neurological:      Mental Status: She is alert. Motor: No tremor or atrophy. Gait: Gait normal.   Psychiatric:         Speech: Speech normal.         Behavior: Behavior normal.         Thought Content: Thought content normal.         No results found for this visit on 03/13/20. Recent Results (from the past 2016 hour(s))   TSH with Reflex    Collection Time: 01/16/20  9:28 AM   Result Value Ref Range    TSH 5.930 (H) 0.440 - 3.860 uIU/mL   T4, Free    Collection Time: 01/16/20  9:28 AM   Result Value Ref Range    T4 Free 1.22 0.84 - 1.68 ng/dL   TSH with Reflex    Collection Time: 02/17/20  9:34 AM   Result Value Ref Range    TSH 2.190 0.440 - 3.860 uIU/mL           Assessment:       Diagnosis Orders   1. Actinic keratosis  KIRTI Parra MD, Dermatology, [facility]   2. Dermatitis  KIRTI Parra MD, Dermatology, [facility]   3.  Xerotic eczema Orders Placed This Encounter   Procedures   Tanya Candelaria MD, Dermatology, [facility]     Referral Priority:   Routine     Referral Type:   Eval and Treat     Referral Reason:   Specialty Services Required     Referred to Provider:   Tito Cespedes DO     Requested Specialty:   Dermatology     Number of Visits Requested:   1         Plan:   Return if symptoms worsen or fail to improve. Patient Instructions   Refer to Dermatology for further opinion on accuracy of diagnosis as well as treatment options due to extensive body surface involvement. There did not seem to be much of a response to Efudex small area trials. the legs and certain areas of the forearms might better respond to steroids as they appear more xerotic/eczematic          Garett Stallworth M.D.

## 2020-03-25 ENCOUNTER — E-VISIT (OUTPATIENT)
Dept: PRIMARY CARE CLINIC | Age: 75
End: 2020-03-25
Payer: MEDICARE

## 2020-03-25 PROCEDURE — 98970 NQHP OL DIG ASSMT&MGMT 5-10: CPT | Performed by: NURSE PRACTITIONER

## 2020-03-26 RX ORDER — CIPROFLOXACIN 500 MG/1
500 TABLET, FILM COATED ORAL 2 TIMES DAILY
Qty: 6 TABLET | Refills: 0 | Status: SHIPPED | OUTPATIENT
Start: 2020-03-26 | End: 2020-03-29

## 2020-03-26 NOTE — PROGRESS NOTES
HPI: Per pt questionnaire  Exam: Not applicable  Diagnosis and all orders for this visit    Assessment:      Acute cystitis        Plan:      1. Medications: ciprofloxacin 500mg twice daily for 3 days  2. Maintain adequate hydration  3. Follow up if symptoms not improving, and prn.     5-10 minutes were spent on the digital evaluation and management of this patient.

## 2020-05-19 ENCOUNTER — VIRTUAL VISIT (OUTPATIENT)
Dept: FAMILY MEDICINE CLINIC | Age: 75
End: 2020-05-19
Payer: MEDICARE

## 2020-05-19 VITALS — WEIGHT: 144 LBS | RESPIRATION RATE: 12 BRPM | BODY MASS INDEX: 26.5 KG/M2 | HEIGHT: 62 IN

## 2020-05-19 PROCEDURE — 99214 OFFICE O/P EST MOD 30 MIN: CPT | Performed by: FAMILY MEDICINE

## 2020-05-19 RX ORDER — ESTRADIOL 0.1 MG/G
1 CREAM VAGINAL DAILY
Qty: 1 TUBE | Refills: 1 | Status: SHIPPED | OUTPATIENT
Start: 2020-05-19 | End: 2021-08-11 | Stop reason: SDUPTHER

## 2020-05-19 ASSESSMENT — VISUAL ACUITY: OU: 1

## 2020-05-19 ASSESSMENT — ENCOUNTER SYMPTOMS
ABDOMINAL PAIN: 0
PHOTOPHOBIA: 0
CHEST TIGHTNESS: 0
ABDOMINAL DISTENTION: 0
SHORTNESS OF BREATH: 0

## 2020-05-19 NOTE — PROGRESS NOTES
surgery, gum surgeries    Asthma Daughter         multiple allergies    Arthritis Sister     High Cholesterol Sister      Allergies:  Patient has no known allergies. Review of Systems   Constitutional: Negative for activity change, appetite change, diaphoresis and unexpected weight change. Eyes: Negative for photophobia and visual disturbance. Respiratory: Negative for chest tightness and shortness of breath. No orthopnea   Cardiovascular: Negative for chest pain, palpitations and leg swelling. Gastrointestinal: Negative for abdominal distention and abdominal pain. Genitourinary: Negative for flank pain and frequency. Urine incontinence   Musculoskeletal: Negative for gait problem and joint swelling. Neurological: Negative for dizziness, weakness, light-headedness and headaches. Psychiatric/Behavioral: Negative for confusion. PHYSICAL EXAM/ RESULTS    (Limited exam: only performed what is available through a visual exam during the video encounter as indicated due to the restrictions of the COVID-19 pandemic)    Wt 144 lb (65.3 kg)   BMI 26.34 kg/m²         Physical Exam  Vitals signs and nursing note reviewed. Constitutional:       General: She is not in acute distress. Appearance: Normal appearance. She is well-developed. She is not ill-appearing or diaphoretic. HENT:      Head: Normocephalic and atraumatic. No right periorbital erythema or left periorbital erythema. Hair is normal.      Jaw: No swelling or pain on movement. Right Ear: Hearing normal.      Left Ear: Hearing normal.      Ears:      Comments: External ears are normal shape and even level placement on the head     Nose: No nasal deformity. Right Nostril: No epistaxis. Left Nostril: No epistaxis. Mouth/Throat:      Lips: Pink. Comments: Lips have appropriate movement with conversation. Eyes:      General: Lids are normal. Vision grossly intact. Gaze aligned appropriately.  No vaginal cream         Orders Placed This Encounter   Procedures    TSH with Reflex     This lab can be drawn as OFTEN as every 4 weeks, but DOES NOT HAVE  to be done every 4 weeks     Standing Status:   Standing     Number of Occurrences:   12     Standing Expiration Date:   5/19/2021    Hemoglobin A1C     Standing Status:   Future     Standing Expiration Date:   5/19/2021    Microalbumin / Creatinine Urine Ratio    Comprehensive Metabolic Panel     Standing Status:   Future     Standing Expiration Date:   5/19/2021         Plan:   Return in about 2 months (around 8/3/2020) for AWV and medical conditions. There are no Patient Instructions on file for this visit. This visit ended at 10:13am    The resources used for this visit were [employer] for chart access and doxy. me      Sindi Pradhan M.D. An  electronic signature was used to authenticate this note. --Taz Longoria MD on 5/19/2020 at 1:57 PM        Pursuant to the emergency declaration under the [facility], [facility] waiver authority and the [employer] Act, this Virtual  Visit was conducted, with patient's consent, to reduce the patient's risk of exposure to COVID-19 and provide continuity of care for an established patient. Services were provided through a video synchronous discussion virtually to substitute for in-person clinic visit.

## 2020-05-27 DIAGNOSIS — E03.9 HYPOTHYROIDISM, UNSPECIFIED TYPE: ICD-10-CM

## 2020-05-27 DIAGNOSIS — I10 ESSENTIAL HYPERTENSION: ICD-10-CM

## 2020-05-27 DIAGNOSIS — R73.09 ELEVATED HEMOGLOBIN A1C: ICD-10-CM

## 2020-05-27 LAB
ALBUMIN SERPL-MCNC: 4.5 G/DL (ref 3.5–4.6)
ALP BLD-CCNC: 64 U/L (ref 40–130)
ALT SERPL-CCNC: 29 U/L (ref 0–33)
ANION GAP SERPL CALCULATED.3IONS-SCNC: 9 MEQ/L (ref 9–15)
AST SERPL-CCNC: 26 U/L (ref 0–35)
BILIRUB SERPL-MCNC: <0.2 MG/DL (ref 0.2–0.7)
BUN BLDV-MCNC: 13 MG/DL (ref 8–23)
CALCIUM SERPL-MCNC: 10.2 MG/DL (ref 8.5–9.9)
CHLORIDE BLD-SCNC: 103 MEQ/L (ref 95–107)
CO2: 25 MEQ/L (ref 20–31)
CREAT SERPL-MCNC: 0.69 MG/DL (ref 0.5–0.9)
GFR AFRICAN AMERICAN: >60
GFR NON-AFRICAN AMERICAN: >60
GLOBULIN: 2.6 G/DL (ref 2.3–3.5)
GLUCOSE BLD-MCNC: 88 MG/DL (ref 70–99)
HBA1C MFR BLD: 6 % (ref 4.8–5.9)
POTASSIUM SERPL-SCNC: 4.3 MEQ/L (ref 3.4–4.9)
SODIUM BLD-SCNC: 137 MEQ/L (ref 135–144)
TOTAL PROTEIN: 7.1 G/DL (ref 6.3–8)
TSH REFLEX: 1.99 UIU/ML (ref 0.44–3.86)

## 2020-08-06 ENCOUNTER — OFFICE VISIT (OUTPATIENT)
Dept: FAMILY MEDICINE CLINIC | Age: 75
End: 2020-08-06
Payer: MEDICARE

## 2020-08-06 VITALS
HEART RATE: 64 BPM | HEIGHT: 61 IN | SYSTOLIC BLOOD PRESSURE: 120 MMHG | TEMPERATURE: 99 F | WEIGHT: 141.4 LBS | OXYGEN SATURATION: 98 % | DIASTOLIC BLOOD PRESSURE: 66 MMHG | BODY MASS INDEX: 26.7 KG/M2

## 2020-08-06 PROCEDURE — G0439 PPPS, SUBSEQ VISIT: HCPCS | Performed by: FAMILY MEDICINE

## 2020-08-06 ASSESSMENT — LIFESTYLE VARIABLES
HOW OFTEN DURING THE LAST YEAR HAVE YOU HAD A FEELING OF GUILT OR REMORSE AFTER DRINKING: 0
AUDIT-C TOTAL SCORE: 2
HAS A RELATIVE, FRIEND, DOCTOR, OR ANOTHER HEALTH PROFESSIONAL EXPRESSED CONCERN ABOUT YOUR DRINKING OR SUGGESTED YOU CUT DOWN: 0
HAVE YOU OR SOMEONE ELSE BEEN INJURED AS A RESULT OF YOUR DRINKING: 0
HOW OFTEN DO YOU HAVE SIX OR MORE DRINKS ON ONE OCCASION: 0
HOW OFTEN DURING THE LAST YEAR HAVE YOU NEEDED AN ALCOHOLIC DRINK FIRST THING IN THE MORNING TO GET YOURSELF GOING AFTER A NIGHT OF HEAVY DRINKING: 0
HOW OFTEN DURING THE LAST YEAR HAVE YOU BEEN UNABLE TO REMEMBER WHAT HAPPENED THE NIGHT BEFORE BECAUSE YOU HAD BEEN DRINKING: 0
HOW OFTEN DURING THE LAST YEAR HAVE YOU FOUND THAT YOU WERE NOT ABLE TO STOP DRINKING ONCE YOU HAD STARTED: 0
AUDIT TOTAL SCORE: 2
HOW OFTEN DO YOU HAVE A DRINK CONTAINING ALCOHOL: 2
HOW OFTEN DURING THE LAST YEAR HAVE YOU FAILED TO DO WHAT WAS NORMALLY EXPECTED FROM YOU BECAUSE OF DRINKING: 0
HOW MANY STANDARD DRINKS CONTAINING ALCOHOL DO YOU HAVE ON A TYPICAL DAY: 0

## 2020-08-06 ASSESSMENT — PATIENT HEALTH QUESTIONNAIRE - PHQ9
SUM OF ALL RESPONSES TO PHQ QUESTIONS 1-9: 0
SUM OF ALL RESPONSES TO PHQ QUESTIONS 1-9: 0

## 2020-08-06 NOTE — PATIENT INSTRUCTIONS
tests and screenings are paid in full while other may be subject to a deductible, co-insurance, and/or copay. Some of these benefits include a comprehensive review of your medical history including lifestyle, illnesses that may run in your family, and various assessments and screenings as appropriate. After reviewing your medical record and screening and assessments performed today your provider may have ordered immunizations, labs, imaging, and/or referrals for you. A list of these orders (if applicable) as well as your Preventive Care list are included within your After Visit Summary for your review. Other Preventive Recommendations:    A preventive eye exam performed by an eye specialist is recommended every 1-2 years to screen for glaucoma; cataracts, macular degeneration, and other eye disorders. A preventive dental visit is recommended every 6 months. Try to get at least 150 minutes of exercise per week or 10,000 steps per day on a pedometer . Order or download the FREE \"Exercise & Physical Activity: Your Everyday Guide\" from [employer]. Call 7-414.131.1766 or search [employer] online. You need 2486-6338 mg of calcium and 2632-5580 IU of vitamin D per day. It is possible to meet your calcium requirement with diet alone, but a vitamin D supplement is usually necessary to meet this goal.  When exposed to the sun, use a sunscreen that protects against both UVA and UVB radiation with an SPF of 30 or greater. Reapply every 2 to 3 hours or after sweating, drying off with a towel, or swimming. Always wear a seat belt when traveling in a car. Always wear a helmet when riding a bicycle or motorcycle.

## 2020-08-06 NOTE — PROGRESS NOTES
Medicare Annual Wellness Visit  Name: Jed Goins Date: 2020   MRN: [id number] Sex: Female   Age: 76 y.o. Ethnicity: Non-/Non    : 1945 Race: Ulysses Char is here for [place] and Check-Up (Address hcc's )    Screenings for behavioral, psychosocial and functional/safety risks, and cognitive dysfunction are all negative except as indicated below. These results, as well as other patient data from the [address] form, are documented in Flowsheets linked to this Encounter. No Known Allergies      Prior to Visit Medications    Medication Sig Taking? Authorizing Provider   estradiol (ESTRACE VAGINAL) 0.1 MG/GM vaginal cream Place 1 g vaginally daily Yes Vera Rico MD   levothyroxine (SYNTHROID) 50 MCG tablet Take 1 tablet by mouth daily Yes Vera Rico MD   fluorouracil (EFUDEX) 5 % cream Apply topically 2 times daily. Yes Vera Rico MD   carvedilol (COREG) 6.25 MG tablet Take 1 tablet by mouth 2 times daily Yes Vera Rico MD   fenofibrate 160 MG tablet TAKE 1 TABLET DAILY  Patient taking differently: Take by mouth TAKE 1/2 TABLET DAILY Yes Vera Rico MD   acyclovir (ZOVIRAX) 5 % ointment Apply 6 times per day for 7 day Yes Ac Holder MD   aspirin 81 MG EC tablet Take 81 mg by mouth daily. Yes Historical Provider, MD   Multiple Vitamins-Minerals (MULTIPLE VITAMINS/WOMENS PO) Take  by mouth daily.    Yes Historical Provider, MD         Past Medical History:   Diagnosis Date    Abnormal EKG     BPPV (benign paroxysmal positional vertigo)     Colon polyps 2016    needs f/u in 5 years    Elevated liver enzymes 2013    Family history of heart disease     Female bladder prolapse     Heart murmur     History of bone density study 11    History of mammography, diagnostic 11    Category 3 Left mammogram/ Category 2 Right mammogram    Hyperlipidemia     on meds > 10 yrs    Hypertension     on meds > 10 yrs    Internal hemorrhoids     Normal cardiac stress test 12/09/2015    Osteoarthritis, multiple sites     shoulders     Osteopenia 06/09/2015    -1.4; -1.5 9/2018    Recurrent cold sores     Rheumatic fever     Trigger thumb of left hand        Past Surgical History:   Procedure Laterality Date    COLONOSCOPY  7/12/2006    COLONOSCOPY  09/12/2016    EN MYERS MD    HYSTERECTOMY VAGINAL N/A 2/14/2019    TLH BSO performed by Nhung Solo MD at [address] INCISE FINGER TENDON SHEATH Left 5/9/2017    LEFT HAND  FINGER TRIGGER RELEASE THUMB, SUPINE  performed by Niru Fournier MD at [address] N/A 2/14/2019    USLS POSSIBLE SSLS performed by Nhung Solo MD at [address] N/A 2/14/2019    A-P REPAIR performed by Nhung Solo MD at [address] History   Problem Relation Age of Onset    Asthma Father     Diabetes Father     Heart Disease Mother     Diabetes Brother     Heart Disease Brother         heart surgery    Kidney Disease Brother     Other Brother         psoriasis    Other Daughter         shoulder surgery, gum surgeries    Asthma Daughter         multiple allergies    Arthritis Sister     High Cholesterol Sister        CareTeam (Including outside providers/suppliers regularly involved in providing care):   Patient Care Team:  Vera Rico MD as PCP - General (Family Medicine)  Vera Rico MD as PCP - [facility] Empaneled Provider    Wt Readings from Last 3 Encounters:   08/06/20 141 lb 6.4 oz (64.1 kg)   05/19/20 144 lb (65.3 kg)   03/13/20 145 lb (65.8 kg)     Vitals:    08/06/20 1508   BP: 120/66   Pulse: 64   Temp: 99 °F (37.2 °C)   SpO2: 98%   Weight: 141 lb 6.4 oz (64.1 kg)   Height: 5' 1\" (1.549 m)     Body mass index is 26.72 kg/m². Based upon direct observation of the patient, evaluation of cognition reveals recent and remote memory intact.         Patient's complete Health Risk Assessment and screening values have Abdominal:      General: There is no distension. Musculoskeletal:         General: No deformity. Cervical back: She exhibits normal range of motion, no tenderness and no deformity. Lymphadenopathy:      Cervical:      Right cervical: No superficial cervical adenopathy. Left cervical: No superficial cervical adenopathy. Upper Body:      Right upper body: No supraclavicular adenopathy. Left upper body: No supraclavicular adenopathy. Skin:     General: Skin is warm and dry. Findings: No bruising or rash. Nails: There is no clubbing. Neurological:      Mental Status: She is alert. Cranial Nerves: Cranial nerve deficit: CN II-XII GI without obvious deficit. Sensory: Sensory deficit: grossly intact for hands. Motor: No tremor. Atrophy: B UE and LE without atrophy. Abnormal muscle tone: B UE and LE have normal tone. Coordination: Coordination normal.      Gait: Gait normal.   Psychiatric:         Attention and Perception: She is attentive. Mood and Affect: Mood is not anxious. Affect is not inappropriate. Speech: Speech normal.         Behavior: Behavior is not agitated. Behavior is cooperative. Judgment: Judgment normal.         Recommendations for Preventive Services Due: see orders and patient instructions/AVS.  . Recommended screening schedule for the next 5-10 years is provided to the patient in written form: see Patient Madeleine Robertson was seen today for medicare awv and check-up. Diagnoses and all orders for this visit:    Medicare annual wellness visit, subsequent    Essential hypertension  -     Basic Metabolic Panel; Future  -     Lipid, Fasting;  Future    Actinic keratosis

## 2020-08-25 ENCOUNTER — HOSPITAL ENCOUNTER (OUTPATIENT)
Dept: WOMENS IMAGING | Age: 75
Discharge: HOME | End: 2020-08-27
Payer: MEDICARE

## 2020-08-25 PROCEDURE — 77063 BREAST TOMOSYNTHESIS BI: CPT

## 2020-11-02 RX ORDER — CARVEDILOL 6.25 MG/1
TABLET ORAL
Qty: 180 TABLET | Refills: 3 | Status: SHIPPED | OUTPATIENT
Start: 2020-11-02 | End: 2021-11-09 | Stop reason: SDUPTHER

## 2020-11-02 RX ORDER — FENOFIBRATE 160 MG/1
TABLET ORAL
Qty: 90 TABLET | Refills: 3 | Status: SHIPPED | OUTPATIENT
Start: 2020-11-02 | End: 2022-01-04

## 2020-11-11 DIAGNOSIS — I10 ESSENTIAL HYPERTENSION: ICD-10-CM

## 2020-11-11 LAB
ANION GAP SERPL CALCULATED.3IONS-SCNC: 14 MEQ/L (ref 9–15)
BUN BLDV-MCNC: 12 MG/DL (ref 8–23)
CALCIUM SERPL-MCNC: 9.8 MG/DL (ref 8.5–9.9)
CHLORIDE BLD-SCNC: 104 MEQ/L (ref 95–107)
CHOLESTEROL, FASTING: 144 MG/DL (ref 0–199)
CO2: 24 MEQ/L (ref 20–31)
CREAT SERPL-MCNC: 0.62 MG/DL (ref 0.5–0.9)
GFR AFRICAN AMERICAN: >60
GFR NON-AFRICAN AMERICAN: >60
GLUCOSE BLD-MCNC: 92 MG/DL (ref 70–99)
HDLC SERPL-MCNC: 37 MG/DL (ref 40–59)
LDL CHOLESTEROL CALCULATED: 84 MG/DL (ref 0–129)
POTASSIUM SERPL-SCNC: 4.1 MEQ/L (ref 3.4–4.9)
SODIUM BLD-SCNC: 142 MEQ/L (ref 135–144)
TRIGLYCERIDE, FASTING: 116 MG/DL (ref 0–150)

## 2020-11-29 RX ORDER — LEVOTHYROXINE SODIUM 0.05 MG/1
TABLET ORAL
Qty: 90 TABLET | Refills: 3 | Status: SHIPPED | OUTPATIENT
Start: 2020-11-29 | End: 2021-10-20

## 2021-01-26 ENCOUNTER — OFFICE VISIT (OUTPATIENT)
Dept: FAMILY MEDICINE CLINIC | Age: 76
End: 2021-01-26
Payer: MEDICARE

## 2021-01-26 VITALS
WEIGHT: 141.6 LBS | DIASTOLIC BLOOD PRESSURE: 70 MMHG | OXYGEN SATURATION: 98 % | HEART RATE: 58 BPM | BODY MASS INDEX: 26.73 KG/M2 | HEIGHT: 61 IN | TEMPERATURE: 98.2 F | SYSTOLIC BLOOD PRESSURE: 126 MMHG

## 2021-01-26 DIAGNOSIS — E03.9 HYPOTHYROIDISM, UNSPECIFIED TYPE: ICD-10-CM

## 2021-01-26 DIAGNOSIS — I10 ESSENTIAL HYPERTENSION: Primary | ICD-10-CM

## 2021-01-26 DIAGNOSIS — I10 ESSENTIAL HYPERTENSION: ICD-10-CM

## 2021-01-26 DIAGNOSIS — R73.09 ELEVATED HEMOGLOBIN A1C: ICD-10-CM

## 2021-01-26 DIAGNOSIS — L82.1 SEBORRHEIC KERATOSES: ICD-10-CM

## 2021-01-26 DIAGNOSIS — L57.0 ACTINIC KERATOSIS: ICD-10-CM

## 2021-01-26 DIAGNOSIS — K64.1 GRADE II INTERNAL HEMORRHOIDS: ICD-10-CM

## 2021-01-26 PROBLEM — H81.10 BPPV (BENIGN PAROXYSMAL POSITIONAL VERTIGO): Status: ACTIVE | Noted: 2021-01-09

## 2021-01-26 PROBLEM — B00.1 RECURRENT COLD SORES: Status: ACTIVE | Noted: 2021-01-09

## 2021-01-26 PROBLEM — M15.9 OSTEOARTHRITIS, MULTIPLE SITES: Status: ACTIVE | Noted: 2021-01-09

## 2021-01-26 LAB
ANION GAP SERPL CALCULATED.3IONS-SCNC: 12 MEQ/L (ref 9–15)
BASOPHILS ABSOLUTE: 0.1 K/UL (ref 0–0.2)
BASOPHILS RELATIVE PERCENT: 0.7 %
BUN BLDV-MCNC: 9 MG/DL (ref 8–23)
CALCIUM SERPL-MCNC: 9.4 MG/DL (ref 8.5–9.9)
CHLORIDE BLD-SCNC: 105 MEQ/L (ref 95–107)
CO2: 23 MEQ/L (ref 20–31)
CREAT SERPL-MCNC: 0.67 MG/DL (ref 0.5–0.9)
EOSINOPHILS ABSOLUTE: 0.1 K/UL (ref 0–0.7)
EOSINOPHILS RELATIVE PERCENT: 1.4 %
GFR AFRICAN AMERICAN: >60
GFR NON-AFRICAN AMERICAN: >60
GLUCOSE BLD-MCNC: 103 MG/DL (ref 70–99)
HBA1C MFR BLD: 6 % (ref 4.8–5.9)
HCT VFR BLD CALC: 47.5 % (ref 37–47)
HEMOGLOBIN: 15.3 G/DL (ref 12–16)
LYMPHOCYTES ABSOLUTE: 1.7 K/UL (ref 1–4.8)
LYMPHOCYTES RELATIVE PERCENT: 22.7 %
MCH RBC QN AUTO: 29.7 PG (ref 27–31.3)
MCHC RBC AUTO-ENTMCNC: 32.1 % (ref 33–37)
MCV RBC AUTO: 92.3 FL (ref 82–100)
MONOCYTES ABSOLUTE: 0.6 K/UL (ref 0.2–0.8)
MONOCYTES RELATIVE PERCENT: 8.4 %
NEUTROPHILS ABSOLUTE: 5.1 K/UL (ref 1.4–6.5)
NEUTROPHILS RELATIVE PERCENT: 66.8 %
PDW BLD-RTO: 14.2 % (ref 11.5–14.5)
PLATELET # BLD: 183 K/UL (ref 130–400)
POTASSIUM SERPL-SCNC: 4.7 MEQ/L (ref 3.4–4.9)
RBC # BLD: 5.14 M/UL (ref 4.2–5.4)
SODIUM BLD-SCNC: 140 MEQ/L (ref 135–144)
TSH REFLEX: 2.68 UIU/ML (ref 0.44–3.86)
WBC # BLD: 7.6 K/UL (ref 4.8–10.8)

## 2021-01-26 PROCEDURE — 99215 OFFICE O/P EST HI 40 MIN: CPT | Performed by: FAMILY MEDICINE

## 2021-01-26 PROCEDURE — G8510 SCR DEP NEG, NO PLAN REQD: HCPCS | Performed by: FAMILY MEDICINE

## 2021-01-26 PROCEDURE — 3288F FALL RISK ASSESSMENT DOCD: CPT | Performed by: FAMILY MEDICINE

## 2021-01-26 ASSESSMENT — PATIENT HEALTH QUESTIONNAIRE - PHQ9
1. LITTLE INTEREST OR PLEASURE IN DOING THINGS: 0
SUM OF ALL RESPONSES TO PHQ QUESTIONS 1-9: 0
SUM OF ALL RESPONSES TO PHQ QUESTIONS 1-9: 0
SUM OF ALL RESPONSES TO PHQ9 QUESTIONS 1 & 2: 0

## 2021-01-26 ASSESSMENT — ENCOUNTER SYMPTOMS
ABDOMINAL PAIN: 0
DIARRHEA: 0
ABDOMINAL DISTENTION: 0
NAUSEA: 1
CONSTIPATION: 0
TROUBLE SWALLOWING: 0
VOICE CHANGE: 0
EYE DISCHARGE: 0
COLOR CHANGE: 0
COUGH: 0
SHORTNESS OF BREATH: 0

## 2021-01-26 NOTE — PATIENT INSTRUCTIONS
Labs ordered today due to the symptom when taking thyroid medication. She is also due for other labs that can be done between now in May. So we will order them for today. No change in hypertensive medications. Skin exam reveals SKs that are stable and actinic keratosis that are stable patient declines treatment at this time. Hemoglobin A1c will be rechecked due to history of elevation. CBC will be checked to make sure the blood loss from hemorrhoids or not significant. Patient Education        Learning About High Blood Pressure  What is high blood pressure? Blood pressure is a measure of how hard the blood pushes against the walls of your arteries. It's normal for blood pressure to go up and down throughout the day. But if it stays up, you have high blood pressure. Another name for high blood pressure is hypertension. Two numbers tell you your blood pressure. The first number is the systolic pressure (top number). It shows how hard the blood pushes when your heart is pumping. The second number is the diastolic pressure (bottom number). It shows how hard the blood pushes between heartbeats, when your heart is relaxed and filling with blood. Your doctor will give you a goal for your blood pressure based on your health and your age. High blood pressure (hypertension) means that the top number stays high, or the bottom number stays high, or both. High blood pressure increases the risk of stroke, heart attack, and other problems. What happens when you have high blood pressure? · Blood flows through your arteries with too much force. Over time, this can damage the heart and the walls of your arteries. But you can't feel it. High blood pressure usually doesn't cause symptoms. · High blood pressure makes your heart work harder. And that can lead to heart failure, which means your heart doesn't pump as much blood as your body needs. · Fat and calcium start to build up in your arteries. This buildup is called hardening of the arteries. It can cause many problems including a heart attack and stroke. · Arteries also carry blood and oxygen to organs like your eyes, kidneys, and brain. If high blood pressure damages those arteries, it can lead to vision loss, kidney disease, stroke, and a higher risk of dementia. How can you prevent high blood pressure? · Stay at a healthy weight. · Try to limit how much sodium you eat to less than 2,300 milligrams (mg) a day. If you limit your sodium to 1,500 mg a day, you can lower your blood pressure even more. ? Buy foods that are labeled \"unsalted,\" \"sodium-free,\" or \"low-sodium. \" Foods labeled \"reduced-sodium\" and \"light sodium\" may still have too much sodium. ? Flavor your food with garlic, lemon juice, onion, vinegar, herbs, and spices instead of salt. Do not use soy sauce, steak sauce, onion salt, garlic salt, mustard, or ketchup on your food. ? Use less salt (or none) when recipes call for it. You can often use half the salt a recipe calls for without losing flavor. · Be physically active. Get at least 30 minutes of exercise on most days of the week. Walking is a good choice. You also may want to do other activities, such as running, swimming, cycling, or playing tennis or team sports. · Limit alcohol to 2 drinks a day for men and 1 drink a day for women. · Eat plenty of fruits, vegetables, and low-fat dairy products. Eat less saturated and total fats. How is high blood pressure treated? · Your doctor will suggest making lifestyle changes to help your heart. For example, your doctor may ask you to eat healthy foods, quit smoking, lose extra weight, and be more active. · If lifestyle changes don't help enough, your doctor may recommend that you take medicine. · When blood pressure is very high, medicines are needed to lower it.

## 2021-08-11 ENCOUNTER — OFFICE VISIT (OUTPATIENT)
Dept: FAMILY MEDICINE CLINIC | Age: 76
End: 2021-08-11
Payer: MEDICARE

## 2021-08-11 VITALS
HEART RATE: 58 BPM | DIASTOLIC BLOOD PRESSURE: 62 MMHG | BODY MASS INDEX: 27.23 KG/M2 | WEIGHT: 144.2 LBS | SYSTOLIC BLOOD PRESSURE: 122 MMHG | HEIGHT: 61 IN | TEMPERATURE: 97.1 F | OXYGEN SATURATION: 98 %

## 2021-08-11 DIAGNOSIS — L29.9 ITCHING: ICD-10-CM

## 2021-08-11 DIAGNOSIS — N99.3 VAGINAL VAULT PROLAPSE AFTER HYSTERECTOMY: ICD-10-CM

## 2021-08-11 DIAGNOSIS — I10 ESSENTIAL HYPERTENSION: ICD-10-CM

## 2021-08-11 DIAGNOSIS — E03.9 HYPOTHYROIDISM, UNSPECIFIED TYPE: ICD-10-CM

## 2021-08-11 DIAGNOSIS — Z00.00 MEDICARE ANNUAL WELLNESS VISIT, SUBSEQUENT: Primary | ICD-10-CM

## 2021-08-11 DIAGNOSIS — E78.2 MIXED HYPERLIPIDEMIA: ICD-10-CM

## 2021-08-11 DIAGNOSIS — R73.03 PREDIABETES: ICD-10-CM

## 2021-08-11 PROCEDURE — 99214 OFFICE O/P EST MOD 30 MIN: CPT | Performed by: FAMILY MEDICINE

## 2021-08-11 PROCEDURE — 99497 ADVNCD CARE PLAN 30 MIN: CPT | Performed by: FAMILY MEDICINE

## 2021-08-11 PROCEDURE — G0439 PPPS, SUBSEQ VISIT: HCPCS | Performed by: FAMILY MEDICINE

## 2021-08-11 RX ORDER — ESTRADIOL 0.1 MG/G
1 CREAM VAGINAL DAILY
Qty: 1 TUBE | Refills: 1 | Status: SHIPPED | OUTPATIENT
Start: 2021-08-11 | End: 2022-01-25 | Stop reason: SDUPTHER

## 2021-08-11 SDOH — ECONOMIC STABILITY: FOOD INSECURITY: WITHIN THE PAST 12 MONTHS, YOU WORRIED THAT YOUR FOOD WOULD RUN OUT BEFORE YOU GOT MONEY TO BUY MORE.: NEVER TRUE

## 2021-08-11 SDOH — ECONOMIC STABILITY: FOOD INSECURITY: WITHIN THE PAST 12 MONTHS, THE FOOD YOU BOUGHT JUST DIDN'T LAST AND YOU DIDN'T HAVE MONEY TO GET MORE.: NEVER TRUE

## 2021-08-11 ASSESSMENT — LIFESTYLE VARIABLES
HOW OFTEN DURING THE LAST YEAR HAVE YOU NEEDED AN ALCOHOLIC DRINK FIRST THING IN THE MORNING TO GET YOURSELF GOING AFTER A NIGHT OF HEAVY DRINKING: 0
AUDIT-C TOTAL SCORE: 3
HOW OFTEN DURING THE LAST YEAR HAVE YOU BEEN UNABLE TO REMEMBER WHAT HAPPENED THE NIGHT BEFORE BECAUSE YOU HAD BEEN DRINKING: 0
HAVE YOU OR SOMEONE ELSE BEEN INJURED AS A RESULT OF YOUR DRINKING: 0
HOW OFTEN DURING THE LAST YEAR HAVE YOU FOUND THAT YOU WERE NOT ABLE TO STOP DRINKING ONCE YOU HAD STARTED: 0
HOW OFTEN DURING THE LAST YEAR HAVE YOU HAD A FEELING OF GUILT OR REMORSE AFTER DRINKING: 0
HOW OFTEN DO YOU HAVE A DRINK CONTAINING ALCOHOL: 3
HAS A RELATIVE, FRIEND, DOCTOR, OR ANOTHER HEALTH PROFESSIONAL EXPRESSED CONCERN ABOUT YOUR DRINKING OR SUGGESTED YOU CUT DOWN: 0
HOW MANY STANDARD DRINKS CONTAINING ALCOHOL DO YOU HAVE ON A TYPICAL DAY: 0
AUDIT TOTAL SCORE: 3
HOW OFTEN DURING THE LAST YEAR HAVE YOU FAILED TO DO WHAT WAS NORMALLY EXPECTED FROM YOU BECAUSE OF DRINKING: 0
HOW OFTEN DO YOU HAVE SIX OR MORE DRINKS ON ONE OCCASION: 0

## 2021-08-11 ASSESSMENT — PATIENT HEALTH QUESTIONNAIRE - PHQ9
SUM OF ALL RESPONSES TO PHQ9 QUESTIONS 1 & 2: 0
2. FEELING DOWN, DEPRESSED OR HOPELESS: 0
1. LITTLE INTEREST OR PLEASURE IN DOING THINGS: 0
SUM OF ALL RESPONSES TO PHQ QUESTIONS 1-9: 0

## 2021-08-11 ASSESSMENT — SOCIAL DETERMINANTS OF HEALTH (SDOH): HOW HARD IS IT FOR YOU TO PAY FOR THE VERY BASICS LIKE FOOD, HOUSING, MEDICAL CARE, AND HEATING?: NOT HARD AT ALL

## 2021-08-11 NOTE — PROGRESS NOTES
Medicare Annual Wellness Visit  Name: Dieudonne Elliott Date: 2021   MRN: [id number] Sex: Female   Age: 76 y.o. Ethnicity: Non- / Non    : 1945 Race: White (non-)      Tomasa Zapata is here for Medicare AWV and Advance Care Planning (confirmed)    Screenings for behavioral, psychosocial and functional/safety risks, and cognitive dysfunction are all negative except as indicated below. These results, as well as other patient data from the [address] form, are documented in Flowsheets linked to this Encounter. No Known Allergies      Prior to Visit Medications    Medication Sig Taking? Authorizing Provider   estradiol (ESTRACE VAGINAL) 0.1 MG/GM vaginal cream Place 1 g vaginally daily Yes Lala Vazquez MD   levothyroxine (SYNTHROID) 50 MCG tablet TAKE 1 TABLET DAILY Yes Lala Vazquez MD   carvedilol (COREG) 6.25 MG tablet TAKE 1 TABLET TWICE A DAY Yes Lala Vazquez MD   fenofibrate (TRIGLIDE) 160 MG tablet TAKE 1/2 TABLET DAILY Yes Lala Vazquez MD   acyclovir (ZOVIRAX) 5 % ointment Apply 6 times per day for 7 day Yes Nic Daigle MD   aspirin 81 MG EC tablet Take 81 mg by mouth daily. Yes Historical Provider, MD   Multiple Vitamins-Minerals (MULTIPLE VITAMINS/WOMENS PO) Take  by mouth daily.    Yes Historical Provider, MD         Past Medical History:   Diagnosis Date    Abnormal EKG     BPPV (benign paroxysmal positional vertigo)     Colon polyps 2016    needs f/u in 5 years    Elevated liver enzymes 2013    Family history of heart disease     Female bladder prolapse     Heart murmur     History of bone density study 11    History of mammography, diagnostic 11    Category 3 Left mammogram/ Category 2 Right mammogram    Hyperlipidemia     on meds > 10 yrs    Hypertension     on meds > 10 yrs    Hypothyroidism 2021    Internal hemorrhoids     Normal cardiac stress test 2015    Osteoarthritis, multiple sites shoulders     Osteopenia 06/09/2015    -1.4; -1.5 9/2018    Recurrent cold sores     Rheumatic fever     Trigger thumb of left hand        Past Surgical History:   Procedure Laterality Date    COLONOSCOPY  7/12/2006    COLONOSCOPY  09/12/2016    EN MYERS MD    HYSTERECTOMY VAGINAL N/A 2/14/2019    TLH BSO performed by Elizabeth Allen MD at [address] INCISE FINGER TENDON SHEATH Left 5/9/2017    LEFT HAND  FINGER TRIGGER RELEASE THUMB, SUPINE  performed by Aretha Carrion MD at [address] N/A 2/14/2019    USLS POSSIBLE SSLS performed by Elizabeth Allen MD at [address] [facility] N/A 2/14/2019    A-P REPAIR performed by Elizabeth Allen MD at [address] History   Problem Relation Age of Onset    Asthma Father     Diabetes Father     Heart Disease Mother     Diabetes Brother     Heart Disease Brother         heart surgery    Kidney Disease Brother     Other Brother         psoriasis    Other Daughter         shoulder surgery, gum surgeries    Asthma Daughter         multiple allergies    Arthritis Sister     High Cholesterol Sister        CareTeam (Including outside providers/suppliers regularly involved in providing care):   Patient Care Team:  Kevin Hendrix MD as PCP - General (Family Medicine)  Kevin Hendrix MD as PCP - REHABILITATION [facility] Empaneled Provider  Isidoro Ramos MD as Consulting Physician (Internal Medicine)    Wt Readings from Last 3 Encounters:   08/11/21 144 lb 3.2 oz (65.4 kg)   01/26/21 141 lb 9.6 oz (64.2 kg)   08/06/20 141 lb 6.4 oz (64.1 kg)     Vitals:    08/11/21 1304   BP: 122/62   Pulse: 58   Temp: 97.1 °F (36.2 °C)   SpO2: 98%   Weight: 144 lb 3.2 oz (65.4 kg)   Height: 5' 1\" (1.549 m)     Body mass index is 27.25 kg/m². Based upon direct observation of the patient, evaluation of cognition reveals recent and remote memory intact.         Patient's complete Health Risk Assessment and screening values have been reviewed and are found in [address] [facility]. The following problems were reviewed today and where indicated follow up appointments were made and/or referrals ordered. Positive Risk Factor Screenings with Interventions:            General Health and ACP:  General  In general, how would you say your health is?: Excellent  In the past 7 days, have you experienced any of the following? New or Increased Pain, New or Increased Fatigue, Loneliness, Social Isolation, Stress or Anger?: None of These  Do you get the social and emotional support that you need?: Yes  Do you have a Living Will?: Yes  Advance Directives     Power of [address] Will ACP-Advance Directive ACP-Power of     Not on File Not on File Not on File Not on File      General Health Risk Interventions:  · pt has and will get on file     [address] (ACP) Physician/NP/PA Conversation    Date of Conversation: 8/11/2021  Conducted with: Patient with Decision Making Capacity    Healthcare Decision Maker:      Primary Decision Maker: Maggie Abimbola - [phone]    Click here to complete [place] including selection of the [place] Relationship (ie \"Primary\")  Today we documented Decision Maker(s) consistent with Legal Next of Kin hierarchy. Care Preferences:    Hospitalization: \"If your health worsens and it becomes clear that your chance of recovery is unlikely, what would be your preference regarding hospitalization? \"  The patient would prefer hospitalization. Ventilation: \"If you were unable to breath on your own and your chance of recovery was unlikely, what would be your preference about the use of a ventilator (breathing machine) if it was available to you? \"  The patient would desire the use of a ventilator. Resuscitation:   \"In the event your heart stopped as a result of an underlying serious health condition, would you want attempts made to restart your heart, or would you prefer a natural death? \"  Yes, attempt to resuscitate. treatment goals    Conversation Outcomes / Follow-Up Plan:  ACP complete - no further action today  Reviewed DNR/DNI and patient elects Full Code (Attempt Resuscitation)    Length of Voluntary ACP Conversation in minutes:  16 minutes    Anibal Monzon MD           Diagnosis Orders   1. Medicare annual wellness visit, subsequent     2. Vaginal vault prolapse after hysterectomy  estradiol (ESTRACE VAGINAL) 0.1 MG/GM vaginal cream   3. Itching     4. Essential hypertension  Basic Metabolic Panel    CBC Auto Differential   5. Hypothyroidism, unspecified type  TSH with Reflex   6. Mixed hyperlipidemia  Lipid, Fasting   7. Prediabetes  Hemoglobin A1C    Microalbumin / Creatinine Urine Ratio     Return for Medicare Annual Wellness Visit in 1 year, January 2022 appt BP, etc.  Patient Instructions   Recommend argan oil shampoo for scalp. Continue lotion and cold compresses for control of symptoms. Patient declines any other medication types of itching control. Blood pressure thyroid etc. well-controlled on current medications. Yearly labs ordered for January 2022. Follow-up for health care maintenance then. Personalized Preventive Plan for Karlene Aguilar - 8/11/2021  Medicare offers a range of preventive health benefits. Some of the tests and screenings are paid in full while other may be subject to a deductible, co-insurance, and/or copay. Some of these benefits include a comprehensive review of your medical history including lifestyle, illnesses that may run in your family, and various assessments and screenings as appropriate. After reviewing your medical record and screening and assessments performed today your provider may have ordered immunizations, labs, imaging, and/or referrals for you. A list of these orders (if applicable) as well as your Preventive Care list are included within your After Visit Summary for your review.     Other Preventive Recommendations:    · A preventive eye exam performed by an eye specialist is recommended every 1-2 years to screen for glaucoma; cataracts, macular degeneration, and other eye disorders. · A preventive dental visit is recommended every 6 months. · Try to get at least 150 minutes of exercise per week or 10,000 steps per day on a pedometer . · Order or download the FREE \"Exercise & Physical Activity: Your Everyday Guide\" from The [employer]. Call 2-252.778.2080 or search The [employer] online. · You need 5815-0529 mg of calcium and 3283-1146 IU of vitamin D per day. It is possible to meet your calcium requirement with diet alone, but a vitamin D supplement is usually necessary to meet this goal.  · When exposed to the sun, use a sunscreen that protects against both UVA and UVB radiation with an SPF of 30 or greater. Reapply every 2 to 3 hours or after sweating, drying off with a towel, or swimming. · Always wear a seat belt when traveling in a car. Always wear a helmet when riding a bicycle or motorcycle. Subjective:      Patient ID: Nancy Perez is a 76 y.o. female who presents for:  Chief Complaint   Patient presents with    Medicare AWV    Advance Care Planning     confirmed       Pt is noting itching from head to toe. Arms are helped by [employer] diabetic formula. Pt states it does not interrupt her sleep          Current Outpatient Medications on File Prior to Visit   Medication Sig Dispense Refill    levothyroxine (SYNTHROID) 50 MCG tablet TAKE 1 TABLET DAILY 90 tablet 3    carvedilol (COREG) 6.25 MG tablet TAKE 1 TABLET TWICE A  tablet 3    fenofibrate (TRIGLIDE) 160 MG tablet TAKE 1/2 TABLET DAILY 90 tablet 3    acyclovir (ZOVIRAX) 5 % ointment Apply 6 times per day for 7 day 30 g 0    aspirin 81 MG EC tablet Take 81 mg by mouth daily.  Multiple Vitamins-Minerals (MULTIPLE VITAMINS/WOMENS PO) Take  by mouth daily.          No current and Sexual Activity    Alcohol use: Yes     Alcohol/week: 0.0 standard drinks     Comment: wine 2 glasses per week    Drug use: No    Sexual activity: Not on file   Other Topics Concern    Not on file   Social History Narrative    Has children that live in [place]     Social Determinants of Health     Financial Resource Strain: Low Risk     Difficulty of Paying Living Expenses: Not hard at all   Food Insecurity: No Food Insecurity    Worried About [email or web address] in the Last Year: Never true    [address] in the Last Year: Never true   Transportation Needs:     Lack of Transportation (Medical):  Lack of Transportation (Non-Medical):    Physical Activity:     Days of Exercise per Week:     Minutes of Exercise per Session:    Stress:     Feeling of Stress :    Social Connections:     Frequency of Communication with Friends and Family:     Frequency of Social Gatherings with Friends and Family:     Attends Sabian[employer] Services:     Active Member of Clubs or Organizations:     Attends Club or Organization Meetings:     Marital Status:    Intimate Partner Violence:     Fear of Current or Ex-Partner:     Emotionally Abused:     Physically Abused:     Sexually Abused:      Family History   Problem Relation Age of Onset    Asthma Father     Diabetes Father     Heart Disease Mother     Diabetes Brother     Heart Disease Brother         heart surgery    Kidney Disease Brother     Other Brother         psoriasis    Other Daughter         shoulder surgery, gum surgeries    Asthma Daughter         multiple allergies    Arthritis Sister     High Cholesterol Sister      Allergies:  Patient has no known allergies. Review of Systems    Objective:   /62   Pulse 58   Temp 97.1 °F (36.2 °C)   Ht 5' 1\" (1.549 m)   Wt 144 lb 3.2 oz (65.4 kg)   SpO2 98%   BMI 27.25 kg/m²     Physical Exam    No results found for this visit on 08/11/21.     No results found for this or any previous and various assessments and screenings as appropriate. After reviewing your medical record and screening and assessments performed today your provider may have ordered immunizations, labs, imaging, and/or referrals for you. A list of these orders (if applicable) as well as your Preventive Care list are included within your After Visit Summary for your review. Other Preventive Recommendations:    · A preventive eye exam performed by an eye specialist is recommended every 1-2 years to screen for glaucoma; cataracts, macular degeneration, and other eye disorders. · A preventive dental visit is recommended every 6 months. · Try to get at least 150 minutes of exercise per week or 10,000 steps per day on a pedometer . · Order or download the FREE \"Exercise & Physical Activity: Your Everyday Guide\" from The [employer]. Call 2-284.317.3291 or search The [employer] online. · You need 4609-0566 mg of calcium and 4429-7426 IU of vitamin D per day. It is possible to meet your calcium requirement with diet alone, but a vitamin D supplement is usually necessary to meet this goal.  · When exposed to the sun, use a sunscreen that protects against both UVA and UVB radiation with an SPF of 30 or greater. Reapply every 2 to 3 hours or after sweating, drying off with a towel, or swimming. · Always wear a seat belt when traveling in a car. Always wear a helmet when riding a bicycle or motorcycle. This note was partially created with the assistance of dictation. This may lead to grammatical or spelling errors. Garett Davis M.D.                 Safety:  Safety  Do you have working smoke detectors?: Yes  Have all throw rugs been removed or fastened?: Yes  Do you have non-slip mats or surfaces in all bathtubs/showers?: Yes  Do all of your stairways have a railing or banister?: (!) No  Are your doorways, halls and stairs free of clutter?: Yes  Do you always fasten your seatbelt when you are in a car?: Yes  Safety Interventions:  · Patient declines any further evaluation/treatment for this issue     Personalized Preventive Plan   Current Health Maintenance Status  Immunization History   Administered Date(s) Administered    COVID-19, Neil Finch, PF, 30mcg/0.3mL 02/01/2021, 02/22/2021    Hepatitis A Adult (Havrix, Vaqta) 09/16/2011, 03/16/2012    Hepatitis B 09/23/1994, 11/28/1994, 04/21/1995    Hepatitis B Adult (Engerix-B) 09/23/1994, 11/28/1994, 04/21/1995    Hepatitis B Adult (Recombivax HB) 09/23/1994, 11/28/1994, 04/21/1995    Influenza Vaccine, unspecified formulation 10/13/2015, 10/24/2016    Influenza Virus Vaccine 01/01/2010, 11/08/2012, 11/21/2013, 09/30/2014    Influenza Whole 11/21/2013, 09/30/2014    Influenza, High Dose (Fluzone 65 yrs and older) 10/24/2016, 10/20/2017, 09/10/2018, 10/03/2019, 09/22/2020    Influenza, Quadv, IM, PF (6 mo and older Fluzone, Flulaval, Fluarix, and 3 yrs and older Afluria) 09/22/2020    Influenza, Quadv, adjuvanted, 65 yrs +, IM, PF (Fluad) 10/24/2016, 09/22/2020    Influenza, Triv, inactivated, subunit, adjuvanted, IM (Fluad 65 yrs and older) 10/03/2019    Pneumococcal Conjugate 13-valent ([id number]) 10/17/2016    Pneumococcal Polysaccharide ([id number]) 03/26/2013    Tdap (Boostrix, Adacel) 09/12/2011, 04/05/2017    Typhoid Vi capsular polysaccharide (Typhim VI) 09/05/2018    Zoster Live (Zostavax) 01/01/2009, 12/14/2009    Zoster Recombinant (Shingrix) 02/19/2019, 05/06/2019, 05/14/2019        Health Maintenance   Topic Date Due    Annual Wellness Visit (AWV)  Never done    Flu vaccine (1) 09/01/2021    Colon cancer screen colonoscopy  09/12/2021    A1C test (Diabetic or Prediabetic)  01/26/2022    TSH testing  01/26/2022    Lipid screen  11/11/2025    DTaP/Tdap/Td vaccine (3 - Td or Tdap) 04/05/2027    DEXA (modify frequency per FRAX score)  Completed    Shingles Vaccine  Completed    Pneumococcal 65+ years Vaccine

## 2021-08-11 NOTE — PATIENT INSTRUCTIONS
Recommend argan oil shampoo for scalp. Continue lotion and cold compresses for control of symptoms. Patient declines any other medication types of itching control. Blood pressure thyroid etc. well-controlled on current medications. Yearly labs ordered for January 2022. Follow-up for health care maintenance then. Personalized Preventive Plan for Carolin Luna - 8/11/2021  Medicare offers a range of preventive health benefits. Some of the tests and screenings are paid in full while other may be subject to a deductible, co-insurance, and/or copay. Some of these benefits include a comprehensive review of your medical history including lifestyle, illnesses that may run in your family, and various assessments and screenings as appropriate. After reviewing your medical record and screening and assessments performed today your provider may have ordered immunizations, labs, imaging, and/or referrals for you. A list of these orders (if applicable) as well as your Preventive Care list are included within your After Visit Summary for your review. Other Preventive Recommendations:    · A preventive eye exam performed by an eye specialist is recommended every 1-2 years to screen for glaucoma; cataracts, macular degeneration, and other eye disorders. · A preventive dental visit is recommended every 6 months. · Try to get at least 150 minutes of exercise per week or 10,000 steps per day on a pedometer . · Order or download the FREE \"Exercise & Physical Activity: Your Everyday Guide\" from The [employer]. Call 5-625.942.2982 or search The [employer] online. · You need 6821-2329 mg of calcium and 7276-7429 IU of vitamin D per day.  It is possible to meet your calcium requirement with diet alone, but a vitamin D supplement is usually necessary to meet this goal.  · When exposed to the sun, use a sunscreen that protects against both UVA and UVB radiation with an SPF of 30 or greater. Reapply every 2 to 3 hours or after sweating, drying off with a towel, or swimming. · Always wear a seat belt when traveling in a car. Always wear a helmet when riding a bicycle or motorcycle.

## 2021-09-01 NOTE — PROGRESS NOTES
Diagnosis Orders   1. Essential hypertension  Basic Metabolic Panel    CBC Auto Differential   2. Grade II internal hemorrhoids scope 2016 confirmed     3. Hypothyroidism, unspecified type  TSH with Reflex   4. Actinic keratosis     5. Elevated hemoglobin A1c  Hemoglobin A1C   6. Seborrheic keratoses       Return in about 6 months (around 7/26/2021) for MAW exam due. Patient Instructions   Labs ordered today due to the symptom when taking thyroid medication. She is also due for other labs that can be done between now in May. So we will order them for today. No change in hypertensive medications. Skin exam reveals SKs that are stable and actinic keratosis that are stable patient declines treatment at this time. Hemoglobin A1c will be rechecked due to history of elevation. CBC will be checked to make sure the blood loss from hemorrhoids or not significant. Patient Education        Learning About High Blood Pressure  What is high blood pressure? Blood pressure is a measure of how hard the blood pushes against the walls of your arteries. It's normal for blood pressure to go up and down throughout the day. But if it stays up, you have high blood pressure. Another name for high blood pressure is hypertension. Two numbers tell you your blood pressure. The first number is the systolic pressure (top number). It shows how hard the blood pushes when your heart is pumping. The second number is the diastolic pressure (bottom number). It shows how hard the blood pushes between heartbeats, when your heart is relaxed and filling with blood. Your doctor will give you a goal for your blood pressure based on your health and your age. High blood pressure (hypertension) means that the top number stays high, or the bottom number stays high, or both. High blood pressure increases the risk of stroke, heart attack, and other problems. What happens when you have high blood pressure? Cough    Coughing is a reflex that clears your throat and your airways. Coughing helps to heal and protect your lungs. It is normal to cough occasionally, but a cough that happens with other symptoms or lasts a long time may be a sign of a condition that needs treatment. Coughing may be caused by infections, asthma or COPD, smoking, postnasal drip, gastroesophageal reflux, as well as other medical conditions. Take medicines only as instructed by your health care provider. Avoid environments or triggers that causes you to cough at work or at home.    SEEK IMMEDIATE MEDICAL CARE IF YOU HAVE ANY OF THE FOLLOWING SYMPTOMS: coughing up blood, shortness of breath, rapid heart rate, chest pain, unexplained weight loss or night sweats. · Blood flows through your arteries with too much force. Over time, this can damage the heart and the walls of your arteries. But you can't feel it. High blood pressure usually doesn't cause symptoms. · High blood pressure makes your heart work harder. And that can lead to heart failure, which means your heart doesn't pump as much blood as your body needs. · Fat and calcium start to build up in your arteries. This buildup is called hardening of the arteries. It can cause many problems including a heart attack and stroke. · Arteries also carry blood and oxygen to organs like your eyes, kidneys, and brain. If high blood pressure damages those arteries, it can lead to vision loss, kidney disease, stroke, and a higher risk of dementia. How can you prevent high blood pressure? · Stay at a healthy weight. · Try to limit how much sodium you eat to less than 2,300 milligrams (mg) a day. If you limit your sodium to 1,500 mg a day, you can lower your blood pressure even more. ? Buy foods that are labeled \"unsalted,\" \"sodium-free,\" or \"low-sodium. \" Foods labeled \"reduced-sodium\" and \"light sodium\" may still have too much sodium. ? Flavor your food with garlic, lemon juice, onion, vinegar, herbs, and spices instead of salt. Do not use soy sauce, steak sauce, onion salt, garlic salt, mustard, or ketchup on your food. ? Use less salt (or none) when recipes call for it. You can often use half the salt a recipe calls for without losing flavor. · Be physically active. Get at least 30 minutes of exercise on most days of the week. Walking is a good choice. You also may want to do other activities, such as running, swimming, cycling, or playing tennis or team sports. · Limit alcohol to 2 drinks a day for men and 1 drink a day for women. · Eat plenty of fruits, vegetables, and low-fat dairy products. Eat less saturated and total fats. How is high blood pressure treated? · Your doctor will suggest making lifestyle changes to help your heart. For example, your doctor may ask you to eat healthy foods, quit smoking, lose extra weight, and be more active. · If lifestyle changes don't help enough, your doctor may recommend that you take medicine. · When blood pressure is very high, medicines are needed to lower it. Follow-up care is a key part of your treatment and safety. Be sure to make and go to all appointments, and call your doctor if you are having problems. It's also a good idea to know your test results and keep a list of the medicines you take. Where can you learn more? Go to https://Upstream Commercepepiceweb.Aureliant. org and sign in to your [employer] what[employer] in store account. Enter P501 in the Gencia box to learn more about \"Learning About High Blood Pressure. \"     If you do not have an account, please click on the \"Sign Up Now\" link. Current as of: December 16, 2019               Content Version: 12.6  © 1422-8514 Reissued, [employer]. Care instructions adapted under license by [facility] . If you have questions about a medical condition or this instruction, always ask your healthcare professional. [place] [address] any warranty or liability for your use of this information. Subjective:      Patient ID: Sundeep Chambers is a 76 y.o. female who presents for:  Chief Complaint   Patient presents with    6 Month Follow-Up     Thyroid issues & HTN        Hemorrhoid bleeding a couple times a month. Doesn't upset her or worry her. Does not think it is enough blood loss to cause anemia. Just wanted to make sure this and expected course. She does states she can feel it externally when she wipes. She notes it tends to get more irritated if she has had frequent loose stools than constipation. Patient notes no problems with her hypertensive medications. Denies hypertensive symptoms or cardiovascular symptoms. Feeling well. Is noting that recently when she takes her thyroid medication she gets very nauseated. She is concerned that something might be wrong with her thyroid levels. History of disseminated superficial actinic porokeratosis. States it tends to be a little bit better in the winter. When she is not outside. She is using a healing Goldbond lotion on it at this time. Feels satisfied with that product. Has not noticed lesions that have enlarged or changed dramatically. She was on Efudex for treatment in the past and the lesions did not respond so was discontinued. Patient denies any symptoms of hyperglycemia or hypoglycemia but has had abnormal hemoglobin A1c in the past.  No medications for this at this time. Current Outpatient Medications on File Prior to Visit   Medication Sig Dispense Refill    levothyroxine (SYNTHROID) 50 MCG tablet TAKE 1 TABLET DAILY 90 tablet 3    carvedilol (COREG) 6.25 MG tablet TAKE 1 TABLET TWICE A  tablet 3    fenofibrate (TRIGLIDE) 160 MG tablet TAKE 1/2 TABLET DAILY 90 tablet 3    estradiol (ESTRACE VAGINAL) 0.1 MG/GM vaginal cream Place 1 g vaginally daily 1 Tube 1    acyclovir (ZOVIRAX) 5 % ointment Apply 6 times per day for 7 day 30 g 0    aspirin 81 MG EC tablet Take 81 mg by mouth daily.  Multiple Vitamins-Minerals (MULTIPLE VITAMINS/WOMENS PO) Take  by mouth daily. No current facility-administered medications on file prior to visit.       Past Medical History:   Diagnosis Date    Abnormal EKG     BPPV (benign paroxysmal positional vertigo)     Colon polyps 09/2016    needs f/u in 5 years    Elevated liver enzymes 11/5/2013    Family history of heart disease     Female bladder prolapse     Heart murmur     History of bone density study 4/7/11    History of mammography, diagnostic 4/13/11    Category 3 Left mammogram/ Category 2 Right mammogram    Hyperlipidemia     on meds > 10 yrs    Hypertension     on meds > 10 yrs  Hypothyroidism 2021    Internal hemorrhoids     Normal cardiac stress test 2015    Osteoarthritis, multiple sites     shoulders     Osteopenia 2015    -1.4; -1.5 2018    Recurrent cold sores     Rheumatic fever     Trigger thumb of left hand      Past Surgical History:   Procedure Laterality Date    COLONOSCOPY  2006    COLONOSCOPY  2016    EN MYERS MD    HYSTERECTOMY VAGINAL N/A 2019    TLH BSO performed by Soumya Katz MD at [address] Left 2017    LEFT HAND  FINGER TRIGGER RELEASE THUMB, SUPINE  performed by Yefri Nation MD at [address] [facility] N/A 2019    USLS POSSIBLE SSLS performed by Soumya Katz MD at [address] [facility] N/A 2019    [facility] History     Socioeconomic History    Marital status: Single     Spouse name: Not on file    Number of children: 2    Years of education: Not on file    Highest education level: Not on file   Occupational History    Occupation: retired   Social Needs    Financial resource strain: Not hard at all   [employer] insecurity     Worry: Never true     Inability: Never true    Transportation needs     Medical: No     Non-medical: No   Tobacco Use    Smoking status: Former Smoker     Packs/day: 0.50     Years: 20.00     Pack years: 10.00     Types: Cigarettes     Quit date: 2004     Years since quittin.4    Smokeless tobacco: Never Used    Tobacco comment: quit in her 42's   Substance and Sexual Activity    Alcohol use:  Yes     Alcohol/week: 0.0 standard drinks     Comment: wine 2 glasses per week    Drug use: No    Sexual activity: Not on file   Lifestyle    Physical activity     Days per week: Not on file     Minutes per session: Not on file    Stress: Not on file   Relationships    Social connections     Talks on phone: Not on file     Gets together: Not on file Attends Church service: Not on file     Active member of club or organization: Not on file     Attends meetings of clubs or organizations: Not on file     Relationship status: Not on file    Intimate partner violence     Fear of current or ex partner: Not on file     Emotionally abused: Not on file     Physically abused: Not on file     Forced sexual activity: Not on file   Other Topics Concern    Not on file   Social History Narrative    Has children that live in [place]     Family History   Problem Relation Age of Onset    Asthma Father     Diabetes Father     Heart Disease Mother     Diabetes Brother     Heart Disease Brother         heart surgery    Kidney Disease Brother     Other Brother         psoriasis    Other Daughter         shoulder surgery, gum surgeries    Asthma Daughter         multiple allergies    Arthritis Sister     High Cholesterol Sister      Allergies:  Patient has no known allergies. Review of Systems   Constitutional: Negative for activity change, appetite change, fatigue and unexpected weight change. HENT: Negative for congestion, dental problem, trouble swallowing and voice change. Eyes: Negative for discharge and visual disturbance. Respiratory: Negative for cough and shortness of breath. Cardiovascular: Negative for chest pain. Gastrointestinal: Positive for nausea. Negative for abdominal distention, abdominal pain, constipation and diarrhea. Endocrine: Negative for polydipsia and polyuria. Genitourinary: Negative for dysuria and urgency. Musculoskeletal: Negative for gait problem and joint swelling. Skin: Positive for rash. Negative for color change and wound. Allergic/Immunologic: Negative for environmental allergies and food allergies. Neurological: Negative for dizziness, speech difficulty, weakness, light-headedness and headaches. Hematological: Does not bruise/bleed easily. Psychiatric/Behavioral: Negative for agitation, behavioral problems and sleep disturbance. Objective:   /70   Pulse 58   Temp 98.2 °F (36.8 °C)   Ht 5' 1\" (1.549 m)   Wt 141 lb 9.6 oz (64.2 kg)   SpO2 98%   BMI 26.76 kg/m²     Physical Exam  Constitutional:       Appearance: Normal appearance. She is well-developed. She is not ill-appearing or diaphoretic. Comments: Vitals signs reviewed   HENT:      Head: Normocephalic and atraumatic. Right Ear: Hearing and external ear normal.      Left Ear: Hearing and external ear normal.      Nose: No nasal deformity or rhinorrhea. Eyes:      General: Lids are normal.         Right eye: No discharge. Left eye: No discharge. Extraocular Movements:      Right eye: No nystagmus. Left eye: No nystagmus. Conjunctiva/sclera: Conjunctivae normal.      Right eye: No hemorrhage. Left eye: No hemorrhage. Pupils: Pupils are equal, round, and reactive to light. Neck:      Musculoskeletal: Full passive range of motion without pain and neck supple. Normal range of motion. Thyroid: No thyroid mass or thyromegaly. Vascular: Normal carotid pulses. No carotid bruit or JVD. Trachea: No tracheal tenderness or tracheal deviation. Cardiovascular:      Rate and Rhythm: Normal rate and regular rhythm. Chest Wall: PMI displaced: normal location PMI. Pulses:           Carotid pulses are 2+ on the right side and 2+ on the left side. Radial pulses are 2+ on the right side and 2+ on the left side. Heart sounds: S1 normal and S2 normal. No murmur. No friction rub. No gallop. No S3 sounds. Pulmonary:      Effort: Pulmonary effort is normal. No accessory muscle usage or respiratory distress. Breath sounds: Normal breath sounds. Chest:      Chest wall: No deformity. Abdominal:      General: There is no distension. Musculoskeletal:         General: No deformity. Cervical back: She exhibits normal range of motion, no tenderness and no deformity. Lymphadenopathy:      Cervical:      Right cervical: No superficial cervical adenopathy. Left cervical: No superficial cervical adenopathy. Upper Body:      Right upper body: No supraclavicular adenopathy. Left upper body: No supraclavicular adenopathy. Skin:     General: Skin is warm and dry. Findings: No bruising or rash. Nails: There is no clubbing. Comments: Diffuse erythematous lesions of the skin with minimal flake on the legs. Back and chest and abdomen are covered with varying size SKs. No facial or neck lesions noted. Neurological:      Mental Status: She is alert. Cranial Nerves: Cranial nerve deficit: CN II-XII GI without obvious deficit. Sensory: Sensory deficit: grossly intact for hands. Motor: No tremor. Atrophy: B UE and LE without atrophy. Abnormal muscle tone: B UE and LE have normal tone. Coordination: Coordination normal.      Gait: Gait normal.   Psychiatric:         Attention and Perception: She is attentive. Mood and Affect: Mood is not anxious. Affect is not inappropriate. Speech: Speech normal.         Behavior: Behavior is not agitated. Behavior is cooperative. Judgment: Judgment normal.         No results found for this visit on 01/26/21.     Recent Results (from the past 2016 hour(s))   Lipid, Fasting    Collection Time: 11/11/20  8:11 AM   Result Value Ref Range    Cholesterol, Fasting 144 0 - 199 mg/dL    Triglyceride, Fasting 116 0 - 150 mg/dL    HDL 37 (L) 40 - 59 mg/dL    LDL Calculated 84 0 - 129 mg/dL   Basic Metabolic Panel    Collection Time: 11/11/20  8:11 AM   Result Value Ref Range    Sodium 142 135 - 144 mEq/L    Potassium 4.1 3.4 - 4.9 mEq/L    Chloride 104 95 - 107 mEq/L    CO2 24 20 - 31 mEq/L    Anion Gap 14 9 - 15 mEq/L    Glucose 92 70 - 99 mg/dL    BUN 12 8 - 23 mg/dL Blood pressure is a measure of how hard the blood pushes against the walls of your arteries. It's normal for blood pressure to go up and down throughout the day. But if it stays up, you have high blood pressure. Another name for high blood pressure is hypertension. Two numbers tell you your blood pressure. The first number is the systolic pressure (top number). It shows how hard the blood pushes when your heart is pumping. The second number is the diastolic pressure (bottom number). It shows how hard the blood pushes between heartbeats, when your heart is relaxed and filling with blood. Your doctor will give you a goal for your blood pressure based on your health and your age. High blood pressure (hypertension) means that the top number stays high, or the bottom number stays high, or both. High blood pressure increases the risk of stroke, heart attack, and other problems. What happens when you have high blood pressure? · Blood flows through your arteries with too much force. Over time, this can damage the heart and the walls of your arteries. But you can't feel it. High blood pressure usually doesn't cause symptoms. · High blood pressure makes your heart work harder. And that can lead to heart failure, which means your heart doesn't pump as much blood as your body needs. · Fat and calcium start to build up in your arteries. This buildup is called hardening of the arteries. It can cause many problems including a heart attack and stroke. · Arteries also carry blood and oxygen to organs like your eyes, kidneys, and brain. If high blood pressure damages those arteries, it can lead to vision loss, kidney disease, stroke, and a higher risk of dementia. How can you prevent high blood pressure? · Stay at a healthy weight. · Try to limit how much sodium you eat to less than 2,300 milligrams (mg) a day. If you limit your sodium to 1,500 mg a day, you can lower your blood pressure even more. ? Buy foods that are labeled \"unsalted,\" \"sodium-free,\" or \"low-sodium. \" Foods labeled \"reduced-sodium\" and \"light sodium\" may still have too much sodium. ? Flavor your food with garlic, lemon juice, onion, vinegar, herbs, and spices instead of salt. Do not use soy sauce, steak sauce, onion salt, garlic salt, mustard, or ketchup on your food. ? Use less salt (or none) when recipes call for it. You can often use half the salt a recipe calls for without losing flavor. · Be physically active. Get at least 30 minutes of exercise on most days of the week. Walking is a good choice. You also may want to do other activities, such as running, swimming, cycling, or playing tennis or team sports. · Limit alcohol to 2 drinks a day for men and 1 drink a day for women. · Eat plenty of fruits, vegetables, and low-fat dairy products. Eat less saturated and total fats. How is high blood pressure treated? · Your doctor will suggest making lifestyle changes to help your heart. For example, your doctor may ask you to eat healthy foods, quit smoking, lose extra weight, and be more active. · If lifestyle changes don't help enough, your doctor may recommend that you take medicine. · When blood pressure is very high, medicines are needed to lower it. Follow-up care is a key part of your treatment and safety. Be sure to make and go to all appointments, and call your doctor if you are having problems. It's also a good idea to know your test results and keep a list of the medicines you take. Where can you learn more? Go to https://CYBERHAWK Innovationspeyadieweb.Glu Mobile. org and sign in to your [employer]zama account. Enter P501 in the Lightonus.com box to learn more about \"Learning About High Blood Pressure. \"     If you do not have an account, please click on the \"Sign Up Now\" link. Current as of: December 16, 2019               Content Version: 12.6  © 7799-0518 [employer]. Care instructions adapted under license by [facility] ([facility]). If you have questions about a medical condition or this instruction, always ask your healthcare professional. [address] any warranty or liability for your use of this information. This note was partially created with the assistance of dictation. This may lead to grammatical or spelling errors. Garett Bateman M.D.

## 2021-09-15 ENCOUNTER — HOSPITAL ENCOUNTER (OUTPATIENT)
Dept: WOMENS IMAGING | Age: 76
Discharge: HOME | End: 2021-09-17
Payer: MEDICARE

## 2021-09-15 DIAGNOSIS — Z12.31 ENCOUNTER FOR SCREENING MAMMOGRAM FOR MALIGNANT NEOPLASM OF BREAST: ICD-10-CM

## 2021-09-15 PROCEDURE — 77063 BREAST TOMOSYNTHESIS BI: CPT

## 2021-10-20 DIAGNOSIS — E03.9 HYPOTHYROIDISM, UNSPECIFIED TYPE: ICD-10-CM

## 2021-10-20 RX ORDER — LEVOTHYROXINE SODIUM 0.05 MG/1
TABLET ORAL
Qty: 90 TABLET | Refills: 0 | Status: SHIPPED | OUTPATIENT
Start: 2021-10-20 | End: 2022-01-25 | Stop reason: SDUPTHER

## 2021-11-09 DIAGNOSIS — I10 ESSENTIAL HYPERTENSION: ICD-10-CM

## 2021-11-10 RX ORDER — CARVEDILOL 6.25 MG/1
TABLET ORAL
Qty: 180 TABLET | Refills: 2 | Status: SHIPPED | OUTPATIENT
Start: 2021-11-10 | End: 2021-11-11 | Stop reason: SDUPTHER

## 2021-11-11 DIAGNOSIS — I10 ESSENTIAL HYPERTENSION: ICD-10-CM

## 2021-11-11 RX ORDER — CARVEDILOL 6.25 MG/1
TABLET ORAL
Qty: 180 TABLET | Refills: 0 | Status: SHIPPED | OUTPATIENT
Start: 2021-11-11 | End: 2022-01-25 | Stop reason: SDUPTHER

## 2022-01-03 DIAGNOSIS — E78.5 HYPERLIPIDEMIA, UNSPECIFIED HYPERLIPIDEMIA TYPE: ICD-10-CM

## 2022-01-03 NOTE — TELEPHONE ENCOUNTER
Future Appointments    Encounter Information    Provider Department Appt Notes   1/25/2022 Trudy Leblanc MD [facility] January 2022 appt BP, etc.   8/15/2022 Trudy Leblanc MD [facility] awv       Recent Visits    08/11/2021 Medicare annual wellness visit, subsequent   [facility] Trudy Leblanc MD   01/26/2021 Essential hypertension   [facility] Trudy Leblanc MD

## 2022-01-04 RX ORDER — FENOFIBRATE 160 MG/1
TABLET ORAL
Qty: 45 TABLET | Refills: 3 | Status: SHIPPED | OUTPATIENT
Start: 2022-01-04 | End: 2022-01-25 | Stop reason: SDUPTHER

## 2022-01-14 DIAGNOSIS — E78.2 MIXED HYPERLIPIDEMIA: ICD-10-CM

## 2022-01-14 DIAGNOSIS — I10 ESSENTIAL HYPERTENSION: ICD-10-CM

## 2022-01-14 DIAGNOSIS — E03.9 HYPOTHYROIDISM, UNSPECIFIED TYPE: ICD-10-CM

## 2022-01-14 DIAGNOSIS — R73.03 PREDIABETES: ICD-10-CM

## 2022-01-14 LAB
ANION GAP SERPL CALCULATED.3IONS-SCNC: 10 MEQ/L (ref 9–15)
BASOPHILS ABSOLUTE: 0 K/UL (ref 0–0.2)
BASOPHILS RELATIVE PERCENT: 0.6 %
BUN BLDV-MCNC: 16 MG/DL (ref 8–23)
CALCIUM SERPL-MCNC: 10.1 MG/DL (ref 8.5–9.9)
CHLORIDE BLD-SCNC: 103 MEQ/L (ref 95–107)
CHOLESTEROL, FASTING: 132 MG/DL (ref 0–199)
CO2: 27 MEQ/L (ref 20–31)
CREAT SERPL-MCNC: 0.73 MG/DL (ref 0.5–0.9)
CREATININE URINE: 87.3 MG/DL
EOSINOPHILS ABSOLUTE: 0.1 K/UL (ref 0–0.7)
EOSINOPHILS RELATIVE PERCENT: 2.4 %
GFR AFRICAN AMERICAN: >60
GFR NON-AFRICAN AMERICAN: >60
GLUCOSE BLD-MCNC: 95 MG/DL (ref 70–99)
HBA1C MFR BLD: 5.9 % (ref 4.8–5.9)
HCT VFR BLD CALC: 47.3 % (ref 37–47)
HDLC SERPL-MCNC: 38 MG/DL (ref 40–59)
HEMOGLOBIN: 15.4 G/DL (ref 12–16)
LDL CHOLESTEROL CALCULATED: 80 MG/DL (ref 0–129)
LYMPHOCYTES ABSOLUTE: 2 K/UL (ref 1–4.8)
LYMPHOCYTES RELATIVE PERCENT: 36 %
MCH RBC QN AUTO: 30 PG (ref 27–31.3)
MCHC RBC AUTO-ENTMCNC: 32.6 % (ref 33–37)
MCV RBC AUTO: 92.3 FL (ref 82–100)
MICROALBUMIN UR-MCNC: <1.2 MG/DL
MICROALBUMIN/CREAT UR-RTO: NORMAL MG/G (ref 0–30)
MONOCYTES ABSOLUTE: 0.6 K/UL (ref 0.2–0.8)
MONOCYTES RELATIVE PERCENT: 10.8 %
NEUTROPHILS ABSOLUTE: 2.8 K/UL (ref 1.4–6.5)
NEUTROPHILS RELATIVE PERCENT: 50.2 %
PDW BLD-RTO: 13.9 % (ref 11.5–14.5)
PLATELET # BLD: 170 K/UL (ref 130–400)
POTASSIUM SERPL-SCNC: 4.7 MEQ/L (ref 3.4–4.9)
RBC # BLD: 5.12 M/UL (ref 4.2–5.4)
SODIUM BLD-SCNC: 140 MEQ/L (ref 135–144)
TRIGLYCERIDE, FASTING: 71 MG/DL (ref 0–150)
TSH REFLEX: 2.4 UIU/ML (ref 0.44–3.86)
WBC # BLD: 5.5 K/UL (ref 4.8–10.8)

## 2022-01-25 ENCOUNTER — OFFICE VISIT (OUTPATIENT)
Dept: FAMILY MEDICINE CLINIC | Age: 77
End: 2022-01-25
Payer: MEDICARE

## 2022-01-25 VITALS
TEMPERATURE: 97.4 F | BODY MASS INDEX: 26.06 KG/M2 | SYSTOLIC BLOOD PRESSURE: 114 MMHG | OXYGEN SATURATION: 98 % | WEIGHT: 138 LBS | DIASTOLIC BLOOD PRESSURE: 66 MMHG | HEIGHT: 61 IN | HEART RATE: 53 BPM

## 2022-01-25 DIAGNOSIS — B00.1 RECURRENT COLD SORES: ICD-10-CM

## 2022-01-25 DIAGNOSIS — N99.3 VAGINAL VAULT PROLAPSE AFTER HYSTERECTOMY: ICD-10-CM

## 2022-01-25 DIAGNOSIS — E03.9 HYPOTHYROIDISM, UNSPECIFIED TYPE: Primary | ICD-10-CM

## 2022-01-25 DIAGNOSIS — R73.03 PREDIABETES: ICD-10-CM

## 2022-01-25 DIAGNOSIS — K64.1 GRADE II INTERNAL HEMORRHOIDS: ICD-10-CM

## 2022-01-25 DIAGNOSIS — E78.2 MIXED HYPERLIPIDEMIA: ICD-10-CM

## 2022-01-25 DIAGNOSIS — I10 ESSENTIAL HYPERTENSION: ICD-10-CM

## 2022-01-25 PROCEDURE — 99214 OFFICE O/P EST MOD 30 MIN: CPT | Performed by: FAMILY MEDICINE

## 2022-01-25 RX ORDER — LEVOTHYROXINE SODIUM 0.05 MG/1
50 TABLET ORAL DAILY
Qty: 90 TABLET | Refills: 3 | Status: SHIPPED | OUTPATIENT
Start: 2022-01-25

## 2022-01-25 RX ORDER — FENOFIBRATE 160 MG/1
160 TABLET ORAL DAILY
Qty: 90 TABLET | Refills: 1 | Status: SHIPPED | OUTPATIENT
Start: 2022-01-25 | End: 2022-08-31 | Stop reason: SDUPTHER

## 2022-01-25 RX ORDER — ESTRADIOL 0.1 MG/G
1 CREAM VAGINAL DAILY
Qty: 42.5 G | Refills: 1 | Status: SHIPPED | OUTPATIENT
Start: 2022-01-25

## 2022-01-25 RX ORDER — CARVEDILOL 6.25 MG/1
TABLET ORAL
Qty: 180 TABLET | Refills: 3 | Status: SHIPPED | OUTPATIENT
Start: 2022-01-25

## 2022-01-25 RX ORDER — ACYCLOVIR 50 MG/G
OINTMENT TOPICAL
Qty: 30 G | Refills: 0 | Status: SHIPPED | OUTPATIENT
Start: 2022-01-25

## 2022-01-25 ASSESSMENT — ENCOUNTER SYMPTOMS
SHORTNESS OF BREATH: 0
CHEST TIGHTNESS: 0
PHOTOPHOBIA: 0
ABDOMINAL DISTENTION: 0
ABDOMINAL PAIN: 0

## 2022-01-25 NOTE — PROGRESS NOTES
Return for Medicare Annual Wellness Visit in 1 year, January 2022 appt BP, etc.  Patient Instructions   Recommend argan oil shampoo for scalp. Continue lotion and cold compresses for control of symptoms. Patient declines any other medication types of itching control. Blood pressure thyroid etc. well-controlled on current medications. Yearly labs ordered for January 2022. Follow-up for health care maintenance then.

## 2022-01-25 NOTE — PROGRESS NOTES
Diagnosis Orders   1. Hypothyroidism, unspecified type  levothyroxine (SYNTHROID) 50 MCG tablet   2. Essential hypertension  carvedilol (COREG) 6.25 MG tablet   3. Mixed hyperlipidemia  fenofibrate (TRIGLIDE) 160 MG tablet   4. Prediabetes     5. Grade II internal hemorrhoids scope 2016 confirmed     6. Vaginal vault prolapse after hysterectomy  estradiol (ESTRACE VAGINAL) 0.1 MG/GM vaginal cream   7. Recurrent cold sores  acyclovir (ZOVIRAX) 5 % ointment     Return in about 7 months (around 8/12/2022) for MAW exam due plus bp check. Patient Instructions   Pt can add Miralax to her medications for hard stool prevention and hemorrhoid management      Subjective:      Patient ID: Ange Benedict is a 68 y.o. female who presents for:  Chief Complaint   Patient presents with    Hypertension     address hcc's     Hemorrhoids       Patient states she is feeling well. Having no trouble. Has recently changed her diet, started upper body workouts, and continued her 3 times a week walking regimen. Feeling good. Glad to see her blood pressures down. Reviewed all her labs. Review of systems      Current Outpatient Medications on File Prior to Visit   Medication Sig Dispense Refill    aspirin 81 MG EC tablet Take 81 mg by mouth daily.  Multiple Vitamins-Minerals (MULTIPLE VITAMINS/WOMENS PO) Take  by mouth daily. No current facility-administered medications on file prior to visit.      Past Medical History:   Diagnosis Date    Abnormal EKG     BPPV (benign paroxysmal positional vertigo)     Colon polyps 09/2016    needs f/u in 5 years    Elevated liver enzymes 11/5/2013    Family history of heart disease     Female bladder prolapse     Heart murmur     History of bone density study 4/7/11    History of mammography, diagnostic 4/13/11    Category 3 Left mammogram/ Category 2 Right mammogram    Hyperlipidemia     on meds > 10 yrs    Hypertension     on meds > 10 yrs    Hypothyroidism 1/9/2021  Internal hemorrhoids     Normal cardiac stress test 2015    Osteoarthritis, multiple sites     shoulders     Osteopenia 2015    -1.4; -1.5 2018    Recurrent cold sores     Rheumatic fever     Trigger thumb of left hand      Past Surgical History:   Procedure Laterality Date    COLONOSCOPY  2006    COLONOSCOPY  2016    EN MYERS MD    HYSTERECTOMY VAGINAL N/A 2019    TLH BSO performed by Gregor Villanueva MD at [address] Left 2017    LEFT HAND  FINGER TRIGGER RELEASE THUMB, SUPINE  performed by Art Queen MD at [address] N/A 2019    USLS POSSIBLE SSLS performed by Gregor Villanueva MD at [address] [facility] N/A 2019    A-P REPAIR performed by Gregor Villanueva MD at [address] [place] History     Socioeconomic History    Marital status: Single     Spouse name: Not on file    Number of children: 2    Years of education: Not on file    Highest education level: Not on file   Occupational History    Occupation: retired   Tobacco Use    Smoking status: Former Smoker     Packs/day: 0.50     Years: 20.00     Pack years: 10.00     Types: Cigarettes     Quit date: 2004     Years since quittin.4    Smokeless tobacco: Never Used    Tobacco comment: quit in her 42's   Vaping Use    Vaping Use: Never used   Substance and Sexual Activity    Alcohol use:  Yes     Alcohol/week: 0.0 standard drinks     Comment: wine 2 glasses per week    Drug use: No    Sexual activity: Not on file   Other Topics Concern    Not on file   Social History Narrative    Has children that live in [place]     Social Determinants of Health     Financial Resource Strain: Low Risk     Difficulty of Paying Living Expenses: Not hard at all   Food Insecurity: No Food Insecurity    Worried About [address] in the Last Year: Never true    [employer] in the Last Year: Never true   Transportation Needs:     Lack of Transportation (Medical): Not on file    Lack of Transportation (Non-Medical): Not on file   Physical Activity:     Days of Exercise per Week: Not on file    Minutes of Exercise per Session: Not on file   Stress:     Feeling of Stress : Not on file   Social Connections:     Frequency of Communication with Friends and Family: Not on file    Frequency of Social Gatherings with Friends and Family: Not on file    Attends [employer] Services: Not on file    Active Member of [address] [place] [address] [employer] or Organizations: Not on file    Attends Club or Organization Meetings: Not on file    Marital Status: Not on file   Intimate Partner Violence:     Fear of Current or Ex-Partner: Not on file    Emotionally Abused: Not on file    Physically Abused: Not on file    Sexually Abused: Not on file   Housing Stability:     Unable to Pay for Housing in the Last Year: Not on file    Number of [place] in the Last Year: Not on file    Unstable Housing in the Last Year: Not on file     Family History   Problem Relation Age of Onset    Asthma Father     Diabetes Father     Heart Disease Mother     Diabetes Brother     Heart Disease Brother         heart surgery    Kidney Disease Brother     Other Brother         psoriasis    Other Daughter         shoulder surgery, gum surgeries    Asthma Daughter         multiple allergies    Arthritis Sister     High Cholesterol Sister      Allergies:  Patient has no known allergies. Review of Systems   Constitutional: Negative for activity change, appetite change, diaphoresis and unexpected weight change. Eyes: Negative for photophobia and visual disturbance. Respiratory: Negative for chest tightness and shortness of breath. No orthopnea   Cardiovascular: Negative for chest pain, palpitations and leg swelling. Gastrointestinal: Negative for abdominal distention and abdominal pain. Genitourinary: Negative for flank pain and frequency.    Musculoskeletal: Negative for gait problem and joint swelling. Neurological: Negative for dizziness, weakness, light-headedness and headaches. Psychiatric/Behavioral: Negative for confusion. Objective:   /66   Pulse 53   Temp 97.4 °F (36.3 °C)   Ht 5' 1\" (1.549 m)   Wt 138 lb (62.6 kg)   SpO2 98%   BMI 26.07 kg/m²     Physical Exam  Constitutional:       General: She is not in acute distress. Appearance: She is well-developed. She is not diaphoretic. HENT:      Head: Normocephalic and atraumatic. Right Ear: External ear normal.      Left Ear: External ear normal.      Nose: Nose normal.   Eyes:      General:         Right eye: No discharge. Left eye: No discharge. Conjunctiva/sclera: Conjunctivae normal.      Pupils: Pupils are equal, round, and reactive to light. Neck:      Thyroid: No thyromegaly. Vascular: No carotid bruit. Trachea: No tracheal deviation. Cardiovascular:      Rate and Rhythm: Normal rate and regular rhythm. Heart sounds: Normal heart sounds. Pulmonary:      Effort: Pulmonary effort is normal. No respiratory distress. Breath sounds: Normal breath sounds. Abdominal:      General: There is no distension. Musculoskeletal:      Cervical back: Neck supple. Skin:     General: Skin is warm and dry. Findings: No bruising or rash. Neurological:      Mental Status: She is alert. Coordination: Coordination normal.   Psychiatric:         Thought Content: Thought content normal.         Judgment: Judgment normal.         No results found for this visit on 01/25/22.     Recent Results (from the past 2016 hour(s))   TSH with Reflex    Collection Time: 01/14/22  8:58 AM   Result Value Ref Range    TSH 2.400 0.440 - 3.860 uIU/mL   Lipid, Fasting    Collection Time: 01/14/22  8:58 AM   Result Value Ref Range    Cholesterol, Fasting 132 0 - 199 mg/dL    Triglyceride, Fasting 71 0 - 150 mg/dL    HDL 38 (L) 40 - 59 mg/dL    LDL Calculated 80 0 - 129 mg/dL Hemoglobin A1C    Collection Time: 01/14/22  8:58 AM   Result Value Ref Range    Hemoglobin A1C 5.9 4.8 - 5.9 %   CBC Auto Differential    Collection Time: 01/14/22  8:58 AM   Result Value Ref Range    WBC 5.5 4.8 - 10.8 K/uL    RBC 5.12 4.20 - 5.40 M/uL    Hemoglobin 15.4 12.0 - 16.0 g/dL    Hematocrit 47.3 (H) 37.0 - 47.0 %    MCV 92.3 82.0 - 100.0 fL    MCH 30.0 27.0 - 31.3 pg    MCHC 32.6 (L) 33.0 - 37.0 %    RDW 13.9 11.5 - 14.5 %    Platelets 036 346 - 295 K/uL    Neutrophils % 50.2 %    Lymphocytes % 36.0 %    Monocytes % 10.8 %    Eosinophils % 2.4 %    Basophils % 0.6 %    Neutrophils Absolute 2.8 1.4 - 6.5 K/uL    Lymphocytes Absolute 2.0 1.0 - 4.8 K/uL    Monocytes Absolute 0.6 0.2 - 0.8 K/uL    Eosinophils Absolute 0.1 0.0 - 0.7 K/uL    Basophils Absolute 0.0 0.0 - 0.2 K/uL   Basic Metabolic Panel    Collection Time: 01/14/22  8:58 AM   Result Value Ref Range    Sodium 140 135 - 144 mEq/L    Potassium 4.7 3.4 - 4.9 mEq/L    Chloride 103 95 - 107 mEq/L    CO2 27 20 - 31 mEq/L    Anion Gap 10 9 - 15 mEq/L    Glucose 95 70 - 99 mg/dL    BUN 16 8 - 23 mg/dL    CREATININE 0.73 0.50 - 0.90 mg/dL    GFR Non-African American >60.0 >60    GFR  >60.0 >60    Calcium 10.1 (H) 8.5 - 9.9 mg/dL   Microalbumin / Creatinine Urine Ratio    Collection Time: 01/14/22  8:58 AM   Result Value Ref Range    Microalbumin, Random Urine <1.20 Not Established mg/dL    Creatinine, Ur 87.3 Not Established mg/dL    Microalbumin Creatinine Ratio see below 0.0 - 30.0 mg/G       [] Pt was seen by provider for      Minutes  Counseling and coordination of care was done for all assessment diagnosis listed for today with patient and any family/friend present. More than 50% of this visit was spent coordinating cuurent care, obtaining information for prior records, and counseling for current plan of action. Assessment:       Diagnosis Orders   1.  Hypothyroidism, unspecified type  levothyroxine (SYNTHROID) 50 MCG tablet   2. Essential hypertension  carvedilol (COREG) 6.25 MG tablet   3. Mixed hyperlipidemia  fenofibrate (TRIGLIDE) 160 MG tablet   4. Prediabetes     5. Grade II internal hemorrhoids scope 2016 confirmed     6. Vaginal vault prolapse after hysterectomy  estradiol (ESTRACE VAGINAL) 0.1 MG/GM vaginal cream   7. Recurrent cold sores  acyclovir (ZOVIRAX) 5 % ointment         No orders of the defined types were placed in this encounter. Orders Placed This Encounter   Medications    fenofibrate (TRIGLIDE) 160 MG tablet     Sig: Take 1 tablet by mouth daily     Dispense:  90 tablet     Refill:  1    carvedilol (COREG) 6.25 MG tablet     Sig: TAKE 1 TABLET TWICE A DAY     Dispense:  180 tablet     Refill:  3    levothyroxine (SYNTHROID) 50 MCG tablet     Sig: Take 1 tablet by mouth Daily     Dispense:  90 tablet     Refill:  3    estradiol (ESTRACE VAGINAL) 0.1 MG/GM vaginal cream     Sig: Place 1 g vaginally daily     Dispense:  42.5 g     Refill:  1    acyclovir (ZOVIRAX) 5 % ointment     Sig: Apply 6 times per day for 7 day     Dispense:  30 g     Refill:  0          Medication List          Accurate as of January 25, 2022 11:14 AM. If you have any questions, ask your nurse or doctor. CHANGE how you take these medications    fenofibrate 160 MG tablet  Commonly known as: TRIGLIDE  Take 1 tablet by mouth daily  What changed:   · how much to take  · how to take this  · when to take this  · additional instructions  Changed by: Elías Bailey MD     levothyroxine 50 MCG tablet  Commonly known as: SYNTHROID  Take 1 tablet by mouth Daily  What changed: See the new instructions.   Changed by: Elías Bailey MD        CONTINUE taking these medications    acyclovir 5 % ointment  Commonly known as: ZOVIRAX  Apply 6 times per day for 7 day     aspirin 81 MG EC tablet     carvedilol 6.25 MG tablet  Commonly known as: COREG  TAKE 1 TABLET TWICE A DAY     estradiol 0.1 MG/GM vaginal cream  Commonly known as: ESTRACE VAGINAL  Place 1 g vaginally daily     MULTIPLE VITAMINS/WOMENS PO           Where to Get Your Medications      These medications were sent to [address], [address] - [address] [phone]  [place] [address]    Phone: [phone]   · acyclovir 5 % ointment  · carvedilol 6.25 MG tablet  · estradiol 0.1 MG/GM vaginal cream  · fenofibrate 160 MG tablet  · levothyroxine 50 MCG tablet           Plan:   Return in about 7 months (around 8/12/2022) for MAW exam due plus bp check. Patient Instructions   Pt can add Miralax to her medications for hard stool prevention and hemorrhoid management      This note was partially created with the assistance of dictation. This may lead to grammatical or spelling errors. Garett Guajrado M.D.

## 2022-03-01 ENCOUNTER — TELEPHONE (OUTPATIENT)
Dept: FAMILY MEDICINE CLINIC | Age: 77
End: 2022-03-01

## 2022-03-01 NOTE — TELEPHONE ENCOUNTER
Her presenting complaints were hypertension and hemorrhoids. You can read her her chief complaint from the nursing staff and then the HPI.   That is not what she came in for according to what she talked about

## 2022-03-01 NOTE — TELEPHONE ENCOUNTER
Pt calling stating that the visit for [facility] 1/25/22 was to be a annual physical,  Pt was billed as a reg office visit, states it was to be a annual physical. Please advise. Pt phone number is [phone].

## 2022-03-04 NOTE — TELEPHONE ENCOUNTER
Pt called back and wanted to speak to [facility] more about this. Pt will call back on Monday to speak to [facility].

## 2022-03-19 ENCOUNTER — OFFICE VISIT (OUTPATIENT)
Dept: FAMILY MEDICINE CLINIC | Age: 77
End: 2022-03-19
Payer: MEDICARE

## 2022-03-19 VITALS
BODY MASS INDEX: 25.34 KG/M2 | TEMPERATURE: 97.9 F | HEART RATE: 62 BPM | DIASTOLIC BLOOD PRESSURE: 64 MMHG | SYSTOLIC BLOOD PRESSURE: 120 MMHG | OXYGEN SATURATION: 98 % | HEIGHT: 61 IN | WEIGHT: 134.2 LBS

## 2022-03-19 DIAGNOSIS — J06.9 URI WITH COUGH AND CONGESTION: Primary | ICD-10-CM

## 2022-03-19 DIAGNOSIS — J02.9 SORE THROAT: ICD-10-CM

## 2022-03-19 LAB — S PYO AG THROAT QL: NORMAL

## 2022-03-19 PROCEDURE — 87880 STREP A ASSAY W/OPTIC: CPT | Performed by: NURSE PRACTITIONER

## 2022-03-19 PROCEDURE — 99213 OFFICE O/P EST LOW 20 MIN: CPT | Performed by: NURSE PRACTITIONER

## 2022-03-19 RX ORDER — AZITHROMYCIN 250 MG/1
250 TABLET, FILM COATED ORAL SEE ADMIN INSTRUCTIONS
Qty: 6 TABLET | Refills: 0 | Status: SHIPPED | OUTPATIENT
Start: 2022-03-19 | End: 2022-03-24

## 2022-03-19 RX ORDER — DEXTROMETHORPHAN HYDROBROMIDE AND PROMETHAZINE HYDROCHLORIDE 15; 6.25 MG/5ML; MG/5ML
5 SYRUP ORAL 4 TIMES DAILY PRN
Qty: 118 ML | Refills: 0 | Status: SHIPPED | OUTPATIENT
Start: 2022-03-19 | End: 2022-05-17 | Stop reason: ALTCHOICE

## 2022-03-19 ASSESSMENT — PATIENT HEALTH QUESTIONNAIRE - PHQ9
SUM OF ALL RESPONSES TO PHQ QUESTIONS 1-9: 0
1. LITTLE INTEREST OR PLEASURE IN DOING THINGS: 0
2. FEELING DOWN, DEPRESSED OR HOPELESS: 0
SUM OF ALL RESPONSES TO PHQ QUESTIONS 1-9: 0
SUM OF ALL RESPONSES TO PHQ9 QUESTIONS 1 & 2: 0

## 2022-03-19 ASSESSMENT — ENCOUNTER SYMPTOMS
SINUS PRESSURE: 1
DIARRHEA: 0
SHORTNESS OF BREATH: 0
COUGH: 1
VOMITING: 0
SORE THROAT: 1
WHEEZING: 0
NAUSEA: 0
RHINORRHEA: 1

## 2022-03-19 NOTE — PROGRESS NOTES
Subjective:      Patient ID: Sarah Salazar is a 68 y.o. female who presents today for:  Chief Complaint   Patient presents with    URI     URI symptoms x 3 days. Negative COVID testing at home. Nasal congestion/drainage. Sore throat. Headaches, sinus pressure. Has not taken anything OTC for relief.         HPI      Her son in law was sick and gave it to her   She has more of a sore throat   Wanted tested for strep   She took an at home covid test Thursday and Friday   She got sick wends  She hasnt taken anything OTC for her symptoms             Past Medical History:   Diagnosis Date    Abnormal EKG     BPPV (benign paroxysmal positional vertigo)     Colon polyps 09/2016    needs f/u in 5 years    Elevated liver enzymes 11/5/2013    Family history of heart disease     Female bladder prolapse     Heart murmur     History of bone density study 4/7/11    History of mammography, diagnostic 4/13/11    Category 3 Left mammogram/ Category 2 Right mammogram    Hyperlipidemia     on meds > 10 yrs    Hypertension     on meds > 10 yrs    Hypothyroidism 1/9/2021    Internal hemorrhoids     Normal cardiac stress test 12/09/2015    Osteoarthritis, multiple sites     shoulders     Osteopenia 06/09/2015    -1.4; -1.5 9/2018    Recurrent cold sores     Rheumatic fever     Trigger thumb of left hand      Past Surgical History:   Procedure Laterality Date    COLONOSCOPY  7/12/2006    COLONOSCOPY  09/12/2016    EN MYERS MD    HYSTERECTOMY VAGINAL N/A 2/14/2019    TLH BSO performed by Mikal Alonzo MD at [address] Left 5/9/2017    LEFT HAND  FINGER TRIGGER RELEASE THUMB, SUPINE  performed by Byron Cox MD at [address] [facility] N/A 2/14/2019    USLS POSSIBLE SSLS performed by Mikal Alonzo MD at [facility] N/A 2/14/2019    [facility]PA[facility] History     Socioeconomic History    Marital status: Single Spouse name: Not on file    Number of children: 2    Years of education: Not on file    Highest education level: Not on file   Occupational History    Occupation: retired   Tobacco Use    Smoking status: Former Smoker     Packs/day: 0.50     Years: 20.00     Pack years: 10.00     Types: Cigarettes     Quit date: 2004     Years since quittin.6    Smokeless tobacco: Never Used    Tobacco comment: quit in her 42's   Vaping Use    Vaping Use: Never used   Substance and Sexual Activity    Alcohol use: Yes     Alcohol/week: 0.0 standard drinks     Comment: wine 2 glasses per week    Drug use: No    Sexual activity: Not on file   Other Topics Concern    Not on file   Social History Narrative    Has children that live in [place]     Social Determinants of Health     Financial Resource Strain: Low Risk     Difficulty of Paying Living Expenses: Not hard at all   Food Insecurity: No Food Insecurity    Worried About [address] in the Last Year: Never true    [address] in the Last Year: Never true   Transportation Needs:     Lack of Transportation (Medical): Not on file    Lack of Transportation (Non-Medical):  Not on file   Physical Activity:     Days of Exercise per Week: Not on file    Minutes of Exercise per Session: Not on file   Stress:     Feeling of Stress : Not on file   Social Connections:     Frequency of Communication with Friends and Family: Not on file    Frequency of Social Gatherings with Friends and Family: Not on file    Attends [employer] Services: Not on file    Active Member of Clubs or Organizations: Not on file    Attends Club or Organization Meetings: Not on file    Marital Status: Not on file   Intimate Partner Violence:     Fear of Current or Ex-Partner: Not on file    Emotionally Abused: Not on file    Physically Abused: Not on file    Sexually Abused: Not on file   Housing Stability:     Unable to Pay for Housing in the Last Year: Not on file    Number of Places Lived in the Last Year: Not on file    Unstable Housing in the Last Year: Not on file     Family History   Problem Relation Age of Onset    Asthma Father     Diabetes Father     Heart Disease Mother     Diabetes Brother     Heart Disease Brother         heart surgery    Kidney Disease Brother     Other Brother         psoriasis    Other Daughter         shoulder surgery, gum surgeries    Asthma Daughter         multiple allergies    Arthritis Sister     High Cholesterol Sister      No Known Allergies  Current Outpatient Medications   Medication Sig Dispense Refill    promethazine-dextromethorphan (PROMETHAZINE-DM) 6.25-15 MG/5ML syrup Take 5 mLs by mouth 4 times daily as needed for Cough 118 mL 0    azithromycin (ZITHROMAX) 250 MG tablet Take 1 tablet by mouth See Admin Instructions for 5 days 500mg on day 1 followed by 250mg on days 2 - 5 6 tablet 0    fenofibrate (TRIGLIDE) 160 MG tablet Take 1 tablet by mouth daily 90 tablet 1    carvedilol (COREG) 6.25 MG tablet TAKE 1 TABLET TWICE A  tablet 3    levothyroxine (SYNTHROID) 50 MCG tablet Take 1 tablet by mouth Daily 90 tablet 3    estradiol (ESTRACE VAGINAL) 0.1 MG/GM vaginal cream Place 1 g vaginally daily 42.5 g 1    acyclovir (ZOVIRAX) 5 % ointment Apply 6 times per day for 7 day 30 g 0    Multiple Vitamins-Minerals (MULTIPLE VITAMINS/WOMENS PO) Take  by mouth daily.  aspirin 81 MG EC tablet Take 81 mg by mouth daily. (Patient not taking: Reported on 3/19/2022)       No current facility-administered medications for this visit. Review of Systems   Constitutional: Positive for chills. Negative for appetite change, fatigue and fever. HENT: Positive for congestion, ear pain (ache comes and goes ), rhinorrhea, sinus pressure and sore throat. Respiratory: Positive for cough. Negative for shortness of breath and wheezing. Gastrointestinal: Negative for diarrhea, nausea and vomiting. Musculoskeletal: Positive for myalgias. Skin: Negative for rash. Neurological: Positive for headaches. Negative for dizziness and light-headedness. Psychiatric/Behavioral: Negative for agitation, confusion and hallucinations. Objective:   /64 (Site: Left Upper Arm, Position: Sitting, Cuff Size: Medium Adult)   Pulse 62   Temp 97.9 °F (36.6 °C) (Infrared)   Ht 5' 1\" (1.549 m)   Wt 134 lb 3.2 oz (60.9 kg)   SpO2 98%   BMI 25.36 kg/m²     Physical Exam  Vitals reviewed. Constitutional:       Appearance: Normal appearance. HENT:      Head: Normocephalic and atraumatic. Right Ear: Hearing, tympanic membrane, ear canal and external ear normal. No middle ear effusion. No foreign body. Tympanic membrane is not injected, erythematous or bulging. Left Ear: Hearing, tympanic membrane, ear canal and external ear normal.  No middle ear effusion. No foreign body. Tympanic membrane is not injected, erythematous or bulging. Nose: Nose normal. No congestion or rhinorrhea. Right Nostril: No foreign body. Left Nostril: No foreign body. Right Turbinates: Not enlarged. Left Turbinates: Not enlarged. Right Sinus: No maxillary sinus tenderness or frontal sinus tenderness. Left Sinus: No maxillary sinus tenderness or frontal sinus tenderness. Mouth/Throat:      Lips: Pink. Mouth: Mucous membranes are moist.      Pharynx: Oropharynx is clear. Uvula midline. Posterior oropharyngeal erythema present. No pharyngeal swelling, oropharyngeal exudate or uvula swelling. Tonsils: No tonsillar exudate or tonsillar abscesses. Eyes:      General: Lids are normal.         Right eye: No foreign body. Left eye: No foreign body. Extraocular Movements: Extraocular movements intact. Conjunctiva/sclera: Conjunctivae normal.   Cardiovascular:      Rate and Rhythm: Normal rate and regular rhythm. Heart sounds: Normal heart sounds.    Pulmonary: Effort: Pulmonary effort is normal. No tachypnea, accessory muscle usage or respiratory distress. Breath sounds: Normal breath sounds. No wheezing or rhonchi. Chest:   Breasts:      Right: No supraclavicular adenopathy. Left: No supraclavicular adenopathy. Abdominal:      Tenderness: There is no abdominal tenderness. There is no guarding. Musculoskeletal:         General: Normal range of motion. Cervical back: Normal range of motion. Lymphadenopathy:      Cervical: No cervical adenopathy. Upper Body:      Right upper body: No supraclavicular adenopathy. Left upper body: No supraclavicular adenopathy. Skin:     General: Skin is warm and dry. Capillary Refill: Capillary refill takes less than 2 seconds. Neurological:      General: No focal deficit present. Mental Status: She is alert and oriented to person, place, and time. Cranial Nerves: Cranial nerves are intact. Gait: Gait is intact. Psychiatric:         Mood and Affect: Mood normal.         Speech: Speech normal.         Behavior: Behavior normal. Behavior is cooperative. Thought Content: Thought content normal.         Judgment: Judgment normal.         Assessment:       Diagnosis Orders   1. URI with cough and congestion  promethazine-dextromethorphan (PROMETHAZINE-DM) 6.25-15 MG/5ML syrup    azithromycin (ZITHROMAX) 250 MG tablet   2. Sore throat  POCT rapid strep A     Results for POC orders placed in visit on 03/19/22   POCT rapid strep A   Result Value Ref Range    Strep A Ag None Detected None Detected      Plan:     Assessment & Plan   [place] Loss was seen today for uri. Diagnoses and all orders for this visit:    URI with cough and congestion  -     promethazine-dextromethorphan (PROMETHAZINE-DM) 6.25-15 MG/5ML syrup; Take 5 mLs by mouth 4 times daily as needed for Cough  -     azithromycin (ZITHROMAX) 250 MG tablet;  Take 1 tablet by mouth See Admin Instructions for 5 days 500mg on day 1 followed by 250mg on days 2 - 5    Sore throat  -     POCT rapid strep A      Orders Placed This Encounter   Procedures    POCT rapid strep A     Orders Placed This Encounter   Medications    promethazine-dextromethorphan (PROMETHAZINE-DM) 6.25-15 MG/5ML syrup     Sig: Take 5 mLs by mouth 4 times daily as needed for Cough     Dispense:  118 mL     Refill:  0    azithromycin (ZITHROMAX) 250 MG tablet     Sig: Take 1 tablet by mouth See Admin Instructions for 5 days 500mg on day 1 followed by 250mg on days 2 - 5     Dispense:  6 tablet     Refill:  0     There are no discontinued medications. Return if symptoms worsen or fail to improve. Reviewed with the patient/family: current clinical status & medications. Side effects of the medication prescribed today, as well as treatment plan/rationale and result expectations have been discussed with the patient/family who expresses understanding. Patient will be discharged home in stable condition. Follow up with PCP to evaluate treatment results or return if symptoms worsen or fail to improve. Discussed signs and symptoms which require immediate follow-up in ED/call to 911. Understanding verbalized. I have reviewed the patient's medical history in detail and updated the computerized patient record.     Cruz Quintana, APRN - CNP

## 2022-03-19 NOTE — PATIENT INSTRUCTIONS
Patient Education        Sore Throat: Care Instructions  Your Care Instructions     Infection by bacteria or a virus causes most sore throats. Cigarette smoke, dry air, air pollution, allergies, and yelling can also cause a sore throat. Sore throats can be painful and annoying. Fortunately, most sore throats go away on their own. If you have a bacterial infection, your doctor may prescribe antibiotics. Follow-up care is a key part of your treatment and safety. Be sure to make and go to all appointments, and call your doctor if you are having problems. It's also a good idea to know your test results and keep a list of the medicines you take. How can you care for yourself at home? · If your doctor prescribed antibiotics, take them as directed. Do not stop taking them just because you feel better. You need to take the full course of antibiotics. · Gargle with warm salt water once an hour to help reduce swelling and relieve discomfort. Use 1 teaspoon of salt mixed in 1 cup of warm water. · Take an over-the-counter pain medicine, such as acetaminophen (Tylenol), ibuprofen (Advil, Motrin), or naproxen (Aleve). Read and follow all instructions on the label. · Be careful when taking over-the-counter cold or flu medicines and Tylenol at the same time. Many of these medicines have acetaminophen, which is Tylenol. Read the labels to make sure that you are not taking more than the recommended dose. Too much acetaminophen (Tylenol) can be harmful. · Drink plenty of fluids. Fluids may help soothe an irritated throat. Hot fluids, such as tea or soup, may help decrease throat pain. · Use over-the-counter throat lozenges to soothe pain. Regular cough drops or hard candy may also help. These should not be given to young children because of the risk of choking. · Do not smoke or allow others to smoke around you. If you need help quitting, talk to your doctor about stop-smoking programs and medicines.  These can increase your chances of quitting for good. · Use a vaporizer or humidifier to add moisture to your bedroom. Follow the directions for cleaning the machine. When should you call for help? Call your doctor now or seek immediate medical care if:    · You have new or worse trouble swallowing.     · Your sore throat gets much worse on one side. Watch closely for changes in your health, and be sure to contact your doctor if you do not get better as expected. Where can you learn more? Go to https://Storrz.Kelan. org and sign in to your [employer]el[employer] account. Enter O883 in the Agora [employer]pping box to learn more about \"Sore Throat: Care Instructions. \"     If you do not have an account, please click on the \"Sign Up Now\" link. Current as of: September 8, 2021               Content Version: 13.1  © 0394-9038 [employer]. Care instructions adapted under license by [facility] ([facility]). If you have questions about a medical condition or this instruction, always ask your healthcare professional. [place] [address] any warranty or liability for your use of this information. Patient Education        Viral Respiratory Infection: Care Instructions  Your Care Instructions     Viruses are very small organisms. They grow in number after they enter your body. There are many types that cause different illnesses, such as colds and the mumps. The symptoms of a viral respiratory infection often start quickly. They include a fever, sore throat, and runny nose. You may also just not feel well. Or you may not want to eat much. Most viral respiratory infections are not serious. They usually get better with time and self-care. Antibiotics are not used to treat a viral infection. That's because antibiotics will not help cure a viral illness. In some cases, antiviral medicine can help your body fight a serious viral infection. Follow-up care is a key part of your treatment and safety.  Be sure to make and go to all appointments, and call your doctor if you are having problems. It's also a good idea to know your test results and keep a list of the medicines you take. How can you care for yourself at home? · Rest as much as possible until you feel better. · Be safe with medicines. Take your medicine exactly as prescribed. Call your doctor if you think you are having a problem with your medicine. You will get more details on the specific medicine your doctor prescribes. · Take an over-the-counter pain medicine, such as acetaminophen (Tylenol), ibuprofen (Advil, Motrin), or naproxen (Aleve), as needed for pain and fever. Read and follow all instructions on the label. Do not give aspirin to anyone younger than 20. It has been linked to Reye syndrome, a serious illness. · Drink plenty of fluids. Hot fluids, such as tea or soup, may help relieve congestion in your nose and throat. If you have kidney, heart, or liver disease and have to limit fluids, talk with your doctor before you increase the amount of fluids you drink. · Try to clear mucus from your lungs by breathing deeply and coughing. · Gargle with warm salt water once an hour. This can help reduce swelling and throat pain. Use 1 teaspoon of salt mixed in 1 cup of warm water. · Do not smoke or allow others to smoke around you. If you need help quitting, talk to your doctor about stop-smoking programs and medicines. These can increase your chances of quitting for good. To avoid spreading the virus  · Cough or sneeze into a tissue. Then throw the tissue away. · If you don't have a tissue, use your hand to cover your cough or sneeze. Then clean your hand. You can also cough into your sleeve. · Wash your hands often. Use soap and warm water. Wash for 15 to 20 seconds each time. · If you don't have soap and water near you, you can clean your hands with alcohol wipes or gel. When should you call for help?    Call your doctor now or seek immediate medical care if:    · You have a new or higher fever.     · Your fever lasts more than 48 hours.     · You have trouble breathing.     · You have a fever with a stiff neck or a severe headache.     · You are sensitive to light.     · You feel very sleepy or confused. Watch closely for changes in your health, and be sure to contact your doctor if:    · You do not get better as expected. Where can you learn more? Go to https://Art of DefencepeReniaceweb.Tenex Health. org and sign in to your BRAIN account. Enter T948 in the TidePool box to learn more about \"Viral Respiratory Infection: Care Instructions. \"     If you do not have an account, please click on the \"Sign Up Now\" link. Current as of: July 6, 2021               Content Version: 13.1  © 3441-3415 Healthwise, Incorporated. Care instructions adapted under license by [facility] ([facility]). If you have questions about a medical condition or this instruction, always ask your healthcare professional. [place] [address] any warranty or liability for your use of this information.

## 2022-03-24 ENCOUNTER — TELEPHONE (OUTPATIENT)
Dept: FAMILY MEDICINE CLINIC | Age: 77
End: 2022-03-24

## 2022-03-24 DIAGNOSIS — K64.1 GRADE II INTERNAL HEMORRHOIDS: Primary | ICD-10-CM

## 2022-03-24 NOTE — TELEPHONE ENCOUNTER
Patient calling to ask for a Colonoscopy order due to the bleeding she is still experiencing. States Dr. Leno Ott is already aware of her situation.      Pt ph. [phone]

## 2022-04-20 ENCOUNTER — OFFICE VISIT (OUTPATIENT)
Dept: FAMILY MEDICINE CLINIC | Age: 77
End: 2022-04-20
Payer: MEDICARE

## 2022-04-20 VITALS
TEMPERATURE: 97.3 F | HEART RATE: 58 BPM | HEIGHT: 61 IN | OXYGEN SATURATION: 97 % | BODY MASS INDEX: 25.98 KG/M2 | WEIGHT: 137.6 LBS | DIASTOLIC BLOOD PRESSURE: 64 MMHG | SYSTOLIC BLOOD PRESSURE: 112 MMHG

## 2022-04-20 DIAGNOSIS — B34.9 VIRAL ILLNESS: ICD-10-CM

## 2022-04-20 DIAGNOSIS — R35.0 URINE FREQUENCY: ICD-10-CM

## 2022-04-20 DIAGNOSIS — J02.9 SORE THROAT: ICD-10-CM

## 2022-04-20 DIAGNOSIS — R35.0 URINE FREQUENCY: Primary | ICD-10-CM

## 2022-04-20 LAB
Lab: NORMAL
PERFORMING INSTRUMENT: NORMAL
QC PASS/FAIL: NORMAL
S PYO AG THROAT QL: NORMAL
SARS-COV-2, POC: NORMAL

## 2022-04-20 PROCEDURE — 87426 SARSCOV CORONAVIRUS AG IA: CPT | Performed by: PHYSICIAN ASSISTANT

## 2022-04-20 PROCEDURE — 81003 URINALYSIS AUTO W/O SCOPE: CPT | Performed by: PHYSICIAN ASSISTANT

## 2022-04-20 PROCEDURE — 87880 STREP A ASSAY W/OPTIC: CPT | Performed by: PHYSICIAN ASSISTANT

## 2022-04-20 PROCEDURE — 99213 OFFICE O/P EST LOW 20 MIN: CPT | Performed by: PHYSICIAN ASSISTANT

## 2022-04-20 RX ORDER — PHENAZOPYRIDINE HYDROCHLORIDE 200 MG/1
200 TABLET, FILM COATED ORAL 3 TIMES DAILY PRN
Qty: 6 TABLET | Refills: 0 | Status: SHIPPED | OUTPATIENT
Start: 2022-04-20 | End: 2022-05-17 | Stop reason: ALTCHOICE

## 2022-04-20 RX ORDER — NITROFURANTOIN 25; 75 MG/1; MG/1
100 CAPSULE ORAL 2 TIMES DAILY
Qty: 14 CAPSULE | Refills: 0 | Status: SHIPPED | OUTPATIENT
Start: 2022-04-20 | End: 2022-04-27

## 2022-04-20 RX ORDER — MELATONIN
5000 DAILY
Qty: 35 TABLET | Refills: 0 | Status: SHIPPED | OUTPATIENT
Start: 2022-04-20 | End: 2022-05-17 | Stop reason: ALTCHOICE

## 2022-04-20 RX ORDER — PREDNISONE 20 MG/1
20 TABLET ORAL DAILY
Qty: 5 TABLET | Refills: 0 | Status: SHIPPED | OUTPATIENT
Start: 2022-04-20 | End: 2022-04-25

## 2022-04-20 RX ORDER — GREEN TEA/HOODIA GORDONII 315-12.5MG
1 CAPSULE ORAL DAILY
Qty: 30 TABLET | Refills: 1 | Status: SHIPPED | OUTPATIENT
Start: 2022-04-20 | End: 2022-05-17 | Stop reason: ALTCHOICE

## 2022-04-20 ASSESSMENT — ENCOUNTER SYMPTOMS
SORE THROAT: 1
COUGH: 1
EYES NEGATIVE: 1
SHORTNESS OF BREATH: 0
GASTROINTESTINAL NEGATIVE: 1

## 2022-04-20 NOTE — PATIENT INSTRUCTIONS
Drink plenty of fluids to stay hydrated  Incorporate green tea  Consider extra immunity supplements such as vitamin C, vitamin D3, and zinc     Upper Respiratory Infection (Cold): Care Instructions  Your Care Instructions     An upper respiratory infection, or URI, is an infection of the nose, sinuses, or throat. URIs are spread by coughs, sneezes, and direct contact. The common cold is the most frequent kind of URI. The flu and sinus infections are otherkinds of URIs. Almost all URIs are caused by viruses. Antibiotics won't cure them. But you can treat most infections with home care. This may include drinking lots of fluids and taking over-the-counter pain medicine. You will probably feel better in 4to 10 days. The doctor has checked you carefully, but problems can develop later. If you notice any problems or new symptoms, get medical treatment right away. Follow-up care is a key part of your treatment and safety. Be sure to make and go to all appointments, and call your doctor if you are having problems. It's also a good idea to know your test results and keep alist of the medicines you take. How can you care for yourself at home?  To prevent dehydration, drink plenty of fluids. Choose water and other clear liquids until you feel better. If you have kidney, heart, or liver disease and have to limit fluids, talk with your doctor before you increase the amount of fluids you drink.  Take an over-the-counter pain medicine, such as acetaminophen (Tylenol), ibuprofen (Advil, Motrin), or naproxen (Aleve). Read and follow all instructions on the label.  Before you use cough and cold medicines, check the label. These medicines may not be safe for young children or for people with certain health problems.  Be careful when taking over-the-counter cold or flu medicines and Tylenol at the same time. Many of these medicines have acetaminophen, which is Tylenol.  Read the labels to make sure that you are not taking more than the recommended dose. Too much acetaminophen (Tylenol) can be harmful.  Get plenty of rest.   Do not smoke or allow others to smoke around you. If you need help quitting, talk to your doctor about stop-smoking programs and medicines. These can increase your chances of quitting for good. When should you call for help? Call 911 anytime you think you may need emergency care. For example, call if:     You have severe trouble breathing. Call your doctor now or seek immediate medical care if:     You seem to be getting much sicker.      You have new or worse trouble breathing.      You have a new or higher fever.      You have a new rash. Watch closely for changes in your health, and be sure to contact your doctor if:     You have a new symptom, such as a sore throat, an earache, or sinus pain.      You cough more deeply or more often, especially if you notice more mucus or a change in the color of your mucus.      You do not get better as expected. Where can you learn more? Go to https://Celestial Semiconductor.WeLab. org and sign in to your Linkovery account. Enter F933 in the Aztek Networks box to learn more about \"Upper Respiratory Infection (Cold): Care Instructions. \"     If you do not have an account, please click on the \"Sign Up Now\" link. Current as of: July 6, 2021               Content Version: 13.2  © 6094-0884 Healthwise, Incorporated. Care instructions adapted under license by [facility] ([facility]). If you have questions about a medical condition or this instruction, always ask your healthcare professional. [place] [address] any warranty or liability for your use of this information. Urinary Tract Infection (UTI) in Women: Care Instructions  Overview     A urinary tract infection, or UTI, is a general term for an infection anywhere between the kidneys and the urethra (where urine comes out). Most UTIs arebladder infections.  They often cause pain or burning when you urinate. UTIs are caused by bacteria and can be cured with antibiotics. Be sure tocomplete your treatment so that the infection does not get worse. Follow-up care is a key part of your treatment and safety. Be sure to make and go to all appointments, and call your doctor if you are having problems. It's also a good idea to know your test results and keep alist of the medicines you take. How can you care for yourself at home?  Take your antibiotics as directed. Do not stop taking them just because you feel better. You need to take the full course of antibiotics.  Drink extra water and other fluids for the next day or two. This will help make the urine less concentrated and help wash out the bacteria that are causing the infection. (If you have kidney, heart, or liver disease and have to limit fluids, talk with your doctor before you increase the amount of fluids you drink.)   Avoid drinks that are carbonated or have caffeine. They can irritate the bladder.  Urinate often. Try to empty your bladder each time.  To relieve pain, take a hot bath or lay a heating pad set on low over your lower belly or genital area. Never go to sleep with a heating pad in place. To prevent UTIs   Drink plenty of water each day. This helps you urinate often, which clears bacteria from your system. (If you have kidney, heart, or liver disease and have to limit fluids, talk with your doctor before you increase the amount of fluids you drink.)   Urinate when you need to.  If you are sexually active, urinate right after you have sex.  Change sanitary pads often.  Avoid douches, bubble baths, feminine hygiene sprays, and other feminine hygiene products that have deodorants.  After going to the bathroom, wipe from front to back. When should you call for help?    Call your doctor now or seek immediate medical care if:     Symptoms such as fever, chills, nausea, or vomiting get worse or appear for the first time.      You have new pain in your back just below your rib cage. This is called flank pain.      There is new blood or pus in your urine.      You have any problems with your antibiotic medicine. Watch closely for changes in your health, and be sure to contact your doctor if:     You are not getting better after taking an antibiotic for 2 days.      Your symptoms go away but then come back. Where can you learn more? Go to https://easy2comply (Dynasec)peixigoeweb.healthAlexza Pharmaceuticals. org and sign in to your [employer]ell[employer] account. Enter N344 in the Reg[employer] Solutions box to learn more about \"Urinary Tract Infection (UTI) in Women: Care Instructions. \"     If you do not have an account, please click on the \"Sign Up Now\" link. Current as of: October 18, 2021               Content Version: 13.2  © 5358-1278 Healthwise, Incorporated. Care instructions adapted under license by [facility] ([facility]). If you have questions about a medical condition or this instruction, always ask your healthcare professional. [place] [address] any warranty or liability for your use of this information.

## 2022-04-20 NOTE — PROGRESS NOTES
[facility] PRIMARY CARE  [address] [facility] [address]  Dept: [phone]  Loc: [phone]     Pt Name: Nancy Perez  MRN: [id number]  [place] 1945      HISTORY OF PRESENT ILLNESS    Nancy Perez is a 68 y.o. female who presents to the [facility] with chief complaint of UTI sx. She states she gets them occasionally. She noticed an odor to her urine along with decreased stream and urinary frequency. She denies fever or abdominal pain. She denies no new low back pain. Patient states she was also seen for sore throat a month ago. Patient states about 5 days ago she developed sore throat, slight cough, nasal congestion. No loss in smell or taste. No shortness of breath or chest pain. REVIEW OF SYSTEMS       Review of Systems   Constitutional: Negative. HENT: Positive for congestion and sore throat. Eyes: Negative. Respiratory: Positive for cough. Negative for shortness of breath. Cardiovascular: Negative. Gastrointestinal: Negative. Endocrine: Negative. Genitourinary: Positive for dysuria. Musculoskeletal: Negative. Skin: Negative. Neurological: Negative. Psychiatric/Behavioral: Negative.           PAST MEDICAL HISTORY     Past Medical History:   Diagnosis Date    Abnormal EKG     BPPV (benign paroxysmal positional vertigo)     Colon polyps 09/2016    needs f/u in 5 years    Elevated liver enzymes 11/5/2013    Family history of heart disease     Female bladder prolapse     Heart murmur     History of bone density study 4/7/11    History of mammography, diagnostic 4/13/11    Category 3 Left mammogram/ Category 2 Right mammogram    Hyperlipidemia     on meds > 10 yrs    Hypertension     on meds > 10 yrs    Hypothyroidism 1/9/2021    Internal hemorrhoids     Normal cardiac stress test 12/09/2015    Osteoarthritis, multiple sites     shoulders     Osteopenia 06/09/2015    -1.4; -1.5 9/2018  Recurrent cold sores     Rheumatic fever     Trigger thumb of left hand          SURGICAL HISTORY       Past Surgical History:   Procedure Laterality Date    COLONOSCOPY  7/12/2006    COLONOSCOPY  09/12/2016    EN MYERS MD    HYSTERECTOMY VAGINAL N/A 2/14/2019    [facility] BSO performed by Iron Andres MD at [address] Left 5/9/2017    LEFT HAND  FINGER TRIGGER RELEASE THUMB, SUPINE  performed by Cynthia Reyna MD at [address] [facility] N/A 2/14/2019    USLS POSSIBLE SSLS performed by Iron Andres MD at [facility] N/A 2/14/2019    A-P REPAIR performed by Iron Andres MD at [address] [facility]       Current Outpatient Medications   Medication Sig Dispense Refill    nitrofurantoin, macrocrystal-monohydrate, (MACROBID) 100 MG capsule Take 1 capsule by mouth 2 times daily for 7 days 14 capsule 0    phenazopyridine (PYRIDIUM) 200 MG tablet Take 1 tablet by mouth 3 times daily as needed for Pain 6 tablet 0    Probiotic Acidophilus (FLORANEX) TABS Take 1 tablet by mouth daily 30 tablet 1    vitamin D3 (CHOLECALCIFEROL) 25 MCG (1000 UT) TABS tablet Take 5 tablets by mouth daily for 7 days 35 tablet 0    promethazine-dextromethorphan (PROMETHAZINE-DM) 6.25-15 MG/5ML syrup Take 5 mLs by mouth 4 times daily as needed for Cough 118 mL 0    fenofibrate (TRIGLIDE) 160 MG tablet Take 1 tablet by mouth daily 90 tablet 1    carvedilol (COREG) 6.25 MG tablet TAKE 1 TABLET TWICE A  tablet 3    levothyroxine (SYNTHROID) 50 MCG tablet Take 1 tablet by mouth Daily 90 tablet 3    estradiol (ESTRACE VAGINAL) 0.1 MG/GM vaginal cream Place 1 g vaginally daily 42.5 g 1    acyclovir (ZOVIRAX) 5 % ointment Apply 6 times per day for 7 day 30 g 0    aspirin 81 MG EC tablet Take 81 mg by mouth daily. (Patient not taking: Reported on 3/19/2022)      Multiple Vitamins-Minerals (MULTIPLE VITAMINS/WOMENS PO) Take  by mouth daily.          No current facility-administered medications for this visit. ALLERGIES     Patient has no known allergies. FAMILY HISTORY       Family History   Problem Relation Age of Onset    Asthma Father     Diabetes Father     Heart Disease Mother     Diabetes Brother     Heart Disease Brother         heart surgery    Kidney Disease Brother     Other Brother         psoriasis    Other Daughter         shoulder surgery, gum surgeries    Asthma Daughter         multiple allergies    Arthritis Sister     High Cholesterol Sister           SOCIAL HISTORY       Social History     Socioeconomic History    Marital status: Single     Spouse name: None    Number of children: 2    Years of education: None    Highest education level: None   Occupational History    Occupation: retired   Tobacco Use    Smoking status: Former Smoker     Packs/day: 0.50     Years: 20.00     Pack years: 10.00     Types: Cigarettes     Quit date: 2004     Years since quittin.6    Smokeless tobacco: Never Used    Tobacco comment: quit in her 42's   Vaping Use    Vaping Use: Never used   Substance and Sexual Activity    Alcohol use: Yes     Alcohol/week: 0.0 standard drinks     Comment: wine 2 glasses per week    Drug use: No    Sexual activity: None   Other Topics Concern    None   Social History Narrative    Has children that live in [place]     Social Determinants of Health     Financial Resource Strain: Low Risk     Difficulty of Paying Living Expenses: Not hard at all   Food Insecurity: No Food Insecurity    Worried About [facility] in the Last Year: Never true    [facility] in the Last Year: Never true   Transportation Needs:     Lack of Transportation (Medical): Not on file    Lack of Transportation (Non-Medical):  Not on file   Physical Activity:     Days of Exercise per Week: Not on file    Minutes of Exercise per Session: Not on file   Stress:     Feeling of Stress : Not on file   Social Connections:     Frequency of Communication with Friends and Family: Not on file    Frequency of Social Gatherings with Friends and Family: Not on file    Attends Bahai Services: Not on file    Active Member of Clubs or Organizations: Not on file    Attends Club or Organization Meetings: Not on file    Marital Status: Not on file   Intimate Partner Violence:     Fear of Current or Ex-Partner: Not on file    Emotionally Abused: Not on file    Physically Abused: Not on file    Sexually Abused: Not on file   Housing Stability:     Unable to Pay for Housing in the Last Year: Not on file    Number of [place] in the Last Year: Not on file    Unstable Housing in the Last Year: Not on file         PHYSICAL EXAM    (up to 7 for level 4, 8 or more for level 5)       Physical Exam  Constitutional:       General: She is not in acute distress. Appearance: She is well-developed. HENT:      Head: Normocephalic and atraumatic. Nose: Congestion present. Mouth/Throat:      Pharynx: Oropharyngeal exudate and posterior oropharyngeal erythema present. Eyes:      Conjunctiva/sclera: Conjunctivae normal.      Pupils: Pupils are equal, round, and reactive to light. Cardiovascular:      Rate and Rhythm: Normal rate and regular rhythm. Heart sounds: No murmur heard. Pulmonary:      Effort: No respiratory distress. Breath sounds: Normal breath sounds. No wheezing or rales. Abdominal:      General: There is no distension. Palpations: Abdomen is soft. Tenderness: There is no abdominal tenderness. Musculoskeletal:         General: Normal range of motion. Cervical back: Normal range of motion and neck supple. Skin:     General: Skin is warm and dry. Findings: No erythema or rash. Neurological:      Mental Status: She is alert and oriented to person, place, and time. Cranial Nerves: No cranial nerve deficit.    Psychiatric:         Judgment: Judgment normal. All other labs were within normal range or not returned as of this dictation. Vitals:    Vitals:    04/20/22 0901   BP: 112/64   Site: Right Upper Arm   Position: Sitting   Cuff Size: Medium Adult   Pulse: 58   Temp: 97.3 °F (36.3 °C)   TempSrc: Temporal   SpO2: 97%   Weight: 137 lb 9.6 oz (62.4 kg)   Height: 5' 1\" (1.549 m)       Urine dip shows 2+ Leuks, + Nitrite, and 3+ blood. Patent will be placed on Macrobid, Pyridium, and probiotics for treatment at home. Patient to have urine checked in approximately 7 days. Rapid strep and rapid COVID test are both negative. Patient to follow-up with primary care provider for reevaluation and treatment. Return here if symptoms worsen or if new concerning symptoms arise. She verbalizes understanding of plan at discharge is no further questions. Vitamin D3 for extra immunity support. DISPOSITION/PLAN   1. Urine frequency    - POCT Urinalysis No Micro (Auto)  - Culture, Urine; Future    2. Sore throat    - POCT COVID-19, Antigen  - POCT rapid strep A    3.  Viral illness

## 2022-04-23 LAB
ORGANISM: ABNORMAL
URINE CULTURE, ROUTINE: ABNORMAL
URINE CULTURE, ROUTINE: ABNORMAL

## 2022-05-07 ENCOUNTER — OFFICE VISIT (OUTPATIENT)
Dept: FAMILY MEDICINE CLINIC | Age: 77
End: 2022-05-07
Payer: MEDICARE

## 2022-05-07 VITALS
BODY MASS INDEX: 25.86 KG/M2 | OXYGEN SATURATION: 97 % | WEIGHT: 137 LBS | TEMPERATURE: 97.5 F | HEIGHT: 61 IN | SYSTOLIC BLOOD PRESSURE: 110 MMHG | DIASTOLIC BLOOD PRESSURE: 70 MMHG | HEART RATE: 51 BPM

## 2022-05-07 DIAGNOSIS — Z20.822 EXPOSURE TO COVID-19 VIRUS: Primary | ICD-10-CM

## 2022-05-07 PROCEDURE — 99213 OFFICE O/P EST LOW 20 MIN: CPT | Performed by: NURSE PRACTITIONER

## 2022-05-07 ASSESSMENT — ENCOUNTER SYMPTOMS
SINUS PAIN: 0
DIARRHEA: 0
SORE THROAT: 0
CHEST TIGHTNESS: 0
COUGH: 0
CONSTIPATION: 0
NAUSEA: 0
APNEA: 0
SINUS PRESSURE: 0
SHORTNESS OF BREATH: 0
WHEEZING: 0
EYE REDNESS: 0
EYE ITCHING: 0
TROUBLE SWALLOWING: 0
ABDOMINAL DISTENTION: 0

## 2022-05-07 NOTE — PATIENT INSTRUCTIONS
Patient Education        Learning About Coronavirus [phone])  What is coronavirus (COVID-19)? COVID-19 is a disease caused by a type of coronavirus. This illness was firstfound in December 2019. It has since spread worldwide. Coronaviruses are a large group of viruses. They cause the common cold. They also cause more serious illnesses like [place] East respiratory syndrome (MERS) and severe acute respiratory syndrome (SARS). COVID-19 is caused by a novelcoronavirus. That means it's a new type that has not been seen in people before. What are the symptoms? COVID-19 symptoms may include:   Fever.  Cough.  Trouble breathing.  Chills or repeated shaking with chills.  Muscle and body aches.  Headache.  Sore throat.  New loss of taste or smell.  Vomiting.  Diarrhea. In severe cases, COVID-19 can cause pneumonia and make it hard to breathewithout help from a machine. It can cause death. How is it diagnosed? COVID-19 is diagnosed with a viral test. This may also be called a PCR test or antigen test. It looks for evidence of the virus in your breathing passages orlungs (respiratory system). The test is most often done on a sample from the nose, throat, or lungs. It's sometimes done on a sample of saliva. One way a sample is collected is byputting a long swab into the back of your nose. If you have questions about COVID-19 testing, ask your doctor or go to cdc.govto use the COVID-19 Viral Testing Tool. How is it treated? Mild cases of COVID-19 can be treated at home. Serious cases need treatment in the hospital. Treatment may include medicines to reduce symptoms, plus breathing support such as oxygen therapy or a ventilator. Some people may beplaced on their belly to help their oxygen levels. Treatments that may help people who have COVID-19 include:  Antiviral medicines. These medicines treat viral infections. Immune-based therapy. These medicines help the immune system fight COVID-19. Examples include monoclonal antibodies. Blood thinners. These medicines help prevent blood clots. People with severe illness are at risk for blood clots. How can you protect yourself and others?  Get vaccinated and boosted.  Avoid sick people and stay away from others if you are sick.  Stay at least 6 feet away from other people.  Avoid crowds, especially inside.  Get tested for COVID-19 before you have an indoor visit with people that don't live with you.  Cover your mouth with a tissue when you cough or sneeze.  Wash your hands often, especially after you cough or sneeze. Use soap and water, and scrub for at least 20 seconds. If soap and water aren't available, use an alcohol-based hand .  Avoid touching your mouth, nose, and eyes. Be sure to follow all instructions from the [facility] and your local health authorities. Here are some examples of specific precautions you may need totake.  If you are not fully vaccinated and boosted:  ? Wear a mask if you have to go to public areas.  Even if you're fully vaccinated and boosted, there's still a chance you can get and spread COVID-19. If you live in an area where COVID-19 is spreading quickly, wear a mask if you have to go to indoor public areas. You might also want to wear a mask in crowded outdoor areas as well as public indoor spaces if you:  ? Have certain health conditions. ? Live with someone who has a compromised immune system. ? Live with someone who is not fully vaccinated.  If you have been exposed to the virus and are not fully vaccinated and boosted:  ? Talk to your doctor as soon as you can. Your doctor might have you take medicine to help prevent serious illness. ? Get a COVID-19 test. You may need to be tested more than once. ? Stay home. Try to separate from other people where you live. Don't go to school, work, or public areas. ? Wear a mask around other people for a full 10 days.  Avoid travel and stay away from people at high risk for serious illness. ? Watch for symptoms.  If you have been exposed and you are fully vaccinated and boosted:  ? Talk to your doctor as soon as you can. Your doctor may have you take medicine to help prevent serious illness. ? Get a COVID-19 test. You may need to be tested more than once. ? Wear a mask around other people for a full 10 days. Avoid travel and stay away from people at high risk for serious illness. ? Watch for symptoms. If you're sick or test positive for COVID-19:   Talk to your doctor as soon as you can. Your doctor may have you take medicine to help prevent serious illness.  Get a COVID-19 test unless you have already been tested. You may need to be tested more than once.  Stay home. Leave only if you need to get medical care.  Wear a mask whenever you're around other people for a full 10 days.  Avoid travel and stay away from people at high risk for serious illness.  Limit contact with pets and people in your home. If possible, stay in a separate bedroom and use a separate bathroom.  Clean and disinfect your home every day. Use household  and disinfectant wipes or sprays. Take special care to clean things that you touch with your hands. If you have questions about COVID-19 testing, ask your doctor or go to cdc.govto use the COVID-19 Viral Testing Tool. How can you self-isolate when you have COVID-19? If you have COVID-19, there are things you can do to help avoid spreading thevirus to others.  Limit contact with people in your home. If possible, stay in a separate bedroom and use a separate bathroom.  Wear a mask when you are around other people.  If you have to leave home, avoid crowds and try to stay at least 6 feet away from other people.  Avoid contact with pets and other animals.  Cover your mouth and nose with a tissue when you cough or sneeze. Then throw it in the trash right away.    Wash your hands often, especially after you cough or sneeze. Use soap and water, and scrub for at least 20 seconds. If soap and water aren't available, use an alcohol-based hand .  Don't share personal household items. These include bedding, towels, cups and glasses, and eating utensils. [address] in the warmest water allowed for the fabric type, and dry it completely. It's okay to wash other people's laundry with yours.  Clean and disinfect your home. Use household  and disinfectant wipes or sprays. When should you call for help? Call 911 anytime you think you may need emergency care. For example, call if you have life-threatening symptoms, such as:     You have severe trouble breathing. (You can't talk at all.)      You have constant chest pain or pressure.      You are severely dizzy or lightheaded.      You are confused or can't think clearly.      You have pale, gray, or blue-colored skin or lips.      You pass out (lose consciousness) or are very hard to wake up.      You have loss of balance or trouble walking.      You have trouble seeing out of one or both eyes.      You have weakness or drooping on one side of the face.      You have weakness or numbness in an arm or a leg.      You have trouble speaking.      You have a severe headache.      You have a seizure. Call your doctor now or seek immediate medical care if:     You have moderate trouble breathing. (You can't speak a full sentence.)      You are coughing up blood.      You have signs of low blood pressure. These include feeling lightheaded; being too weak to stand; and having cold, pale, clammy skin. Watch closely for changes in your health, and be sure to contact your doctor if:     Your symptoms get worse.      You are not getting better as expected.      You have new or worse symptoms of anxiety, depression, nightmares, or flashbacks. Call before you go to the doctor's office. Follow their instructions. And wear a mask.   Where can you learn more?  Go to [email or web address] and sign in to your [employer] account. Enter C008 in the [facility] box to learn more about \"Learning About Coronavirus (COVID-19). \"     If you do not have an account, please click on the \"Sign Up Now\" link. Current as of: July 1, 2021               Content Version: 13.2  © 2006-2022 [employer]. Care instructions adapted under license by [facility] ([facility]). If you have questions about a medical condition or this instruction, always ask your healthcare professional. [place] [address] any warranty or liability for your use of this information. Patient Education        COVID-19 Antibody Test: About This Test  What is it? An antibody test looks for antibodies in the blood. These are proteins that your immune system makes, usually after you're exposed to germs like viruses orbacteria or after you get a vaccine. Antibodies work to fight illness. A COVID-19 antibody test looks for antibodies to SARS-CoV-2, the virus that causes COVID-19. If you test positive for these antibodies, it could mean thatyou already had COVID-19 or that you've been vaccinated for COVID-19. Why is it done? This test can be used to diagnose a past infection with the virus that causes COVID-19. Many people who get COVID-19 never have symptoms or have only mild ones. Without antibody testing, these people might never know that they already had the virus. Even if the test shows that you may have had COVID-19, you need to keep taking steps to protect yourself and others from the virus. HavingCOVID-19 in the past may not prevent you from getting it again. Antibody testing is important because:   It could show who has already had COVID-19.  It could show who hasn't had the infection.  It helps experts who are tracking COVID-19 learn more about the virus and how it spreads. Talk to your doctor about what the test results mean for you.   How do you prepare for the test?  You don't need to do anything to prepare for this test. But be sure to followany instructions your health care provider gives you. How is it done? This is a blood test. A health professional may prick your finger or use aneedle to take a sample of blood from your arm. What do your results mean? The result is either positive or negative. A positive result means antibodies to SARS-CoV-2 were found. You probablyalready had COVID-19 or had a COVID-19 vaccine. But:   You could get a \"false-positive\" result. The test might show that you have COVID-19 antibodies when you don't. The test may find antibodies that formed in response to another type of coronavirus.  It's not certain that having these antibodies will protect you from getting COVID-19 again. And if it does, it's not clear how long the protection lasts. A negative result means that these antibodies were not found.  You could get a \"false-negative\" result. It takes a while after you're infected or vaccinated for your immune system to make antibodies.  You could have a negative result but be infected now. You'd need a different test (viral test) to know if you have COVID-19 now. Where can you learn more? Go to [email or web address] and sign in to your [employer] account. Enter A128 in the [facility] box to learn more about \"COVID-19 Antibody Test: About This Test.\"     If you do not have an account, please click on the \"Sign Up Now\" link. Current as of: March 26, 2021               Content Version: 13.2  © 7758-7263 Healthwise, [employer]. Care instructions adapted under license by [facility] ([facility]). If you have questions about a medical condition or this instruction, always ask your healthcare professional. [address] any warranty or liability for your use of this information.

## 2022-05-07 NOTE — PROGRESS NOTES
Subjective:      Patient ID: Vicenta Mccallum is a 68 y.o. female who presents today for:  Chief Complaint   Patient presents with    Concern For COVID-19     exposure to family memeber who is + for covid. april 17th-5/6. POS 5-6     Pt here today and she is covid vaccinated and boostered. HPI     Pt here today requesting a send out PCR covid test as pt reports she stayed with a family member for 2 weeks in [place] and the family she was with tested + and then pt came home and worried and wants tested. Pt declines any covid s/s or distress today.      Past Medical History:   Diagnosis Date    Abnormal EKG     BPPV (benign paroxysmal positional vertigo)     Colon polyps 09/2016    needs f/u in 5 years    Elevated liver enzymes 11/5/2013    Family history of heart disease     Female bladder prolapse     Heart murmur     History of bone density study 4/7/11    History of mammography, diagnostic 4/13/11    Category 3 Left mammogram/ Category 2 Right mammogram    Hyperlipidemia     on meds > 10 yrs    Hypertension     on meds > 10 yrs    Hypothyroidism 1/9/2021    Internal hemorrhoids     Normal cardiac stress test 12/09/2015    Osteoarthritis, multiple sites     shoulders     Osteopenia 06/09/2015    -1.4; -1.5 9/2018    Recurrent cold sores     Rheumatic fever     Trigger thumb of left hand      Past Surgical History:   Procedure Laterality Date    COLONOSCOPY  7/12/2006    COLONOSCOPY  09/12/2016    EN MYERS MD    HYSTERECTOMY VAGINAL N/A 2/14/2019    TLH BSO performed by Kellie Santiago MD at [address] Left 5/9/2017    LEFT HAND  FINGER TRIGGER RELEASE THUMB, SUPINE  performed by Clyde Wilburn MD at [address] [facility] N/A 2/14/2019    USLS POSSIBLE SSLS performed by Kellie Santiago MD at [address] [facility] N/A 2/14/2019    [facility] [address] [facility]     Socioeconomic History    Marital status: Single     Spouse name: Not on file    Number of children: 2    Years of education: Not on file    Highest education level: Not on file   Occupational History    Occupation: retired   Tobacco Use    Smoking status: Former Smoker     Packs/day: 0.50     Years: 20.00     Pack years: 10.00     Types: Cigarettes     Quit date: 2004     Years since quittin.7    Smokeless tobacco: Never Used    Tobacco comment: quit in her 42's   Vaping Use    Vaping Use: Never used   Substance and Sexual Activity    Alcohol use: Yes     Alcohol/week: 0.0 standard drinks     Comment: wine 2 glasses per week    Drug use: No    Sexual activity: Not on file   Other Topics Concern    Not on file   Social History Narrative    Has children that live in [place]     Social Determinants of Health     Financial Resource Strain: Low Risk     Difficulty of Paying Living Expenses: Not hard at all   Food Insecurity: No Food Insecurity    Worried About [email or web address] in the Last Year: Never true    [address] [place] in the Last Year: Never true   Transportation Needs:     Lack of Transportation (Medical): Not on file    Lack of Transportation (Non-Medical):  Not on file   Physical Activity:     Days of Exercise per Week: Not on file    Minutes of Exercise per Session: Not on file   Stress:     Feeling of Stress : Not on file   Social Connections:     Frequency of Communication with Friends and Family: Not on file    Frequency of Social Gatherings with Friends and Family: Not on file    Attends Scientologist Services: Not on file    Active Member of Clubs or Organizations: Not on file    Attends Club or Organization Meetings: Not on file    Marital Status: Not on file   Intimate Partner Violence:     Fear of Current or Ex-Partner: Not on file    Emotionally Abused: Not on file    Physically Abused: Not on file    Sexually Abused: Not on file   Housing Stability:     Unable to Pay for Housing in the Last Year: Not on file    Number of Places Lived in the Last Year: Not on file    Unstable Housing in the Last Year: Not on file     Family History   Problem Relation Age of Onset    Asthma Father     Diabetes Father     Heart Disease Mother     Diabetes Brother     Heart Disease Brother         heart surgery    Kidney Disease Brother     Other Brother         psoriasis    Other Daughter         shoulder surgery, gum surgeries    Asthma Daughter         multiple allergies    Arthritis Sister     High Cholesterol Sister      No Known Allergies      Review of Systems   Constitutional: Negative for activity change, appetite change, chills, fatigue and fever. HENT: Negative for congestion, drooling, ear pain, hearing loss, postnasal drip, sinus pressure, sinus pain, sore throat and trouble swallowing. Eyes: Negative for redness, itching and visual disturbance. Respiratory: Negative for apnea, cough, chest tightness, shortness of breath and wheezing. Cardiovascular: Negative for chest pain and palpitations. Gastrointestinal: Negative for abdominal distention, constipation, diarrhea and nausea. Endocrine: Negative for heat intolerance. Genitourinary: Negative for difficulty urinating, dysuria, flank pain and genital sores. Musculoskeletal: Negative for gait problem, myalgias and neck stiffness. Skin: Negative for rash. Neurological: Negative for seizures, facial asymmetry, weakness and headaches. Hematological: Negative for adenopathy. Psychiatric/Behavioral: Negative for behavioral problems and confusion. All other systems reviewed and are negative. Objective:   /70 (Site: Left Upper Arm, Position: Sitting, Cuff Size: Medium Adult)   Pulse 51   Temp 97.5 °F (36.4 °C) (Tympanic)   Ht 5' 1\" (1.549 m)   Wt 137 lb (62.1 kg)   SpO2 97%   Breastfeeding No   BMI 25.89 kg/m²     Physical Exam  Vitals and nursing note reviewed. Constitutional:       General: She is awake.  She is not in acute distress. Appearance: Normal appearance. She is well-developed and well-groomed. She is not ill-appearing or toxic-appearing. HENT:      Head: Normocephalic and atraumatic. Right Ear: Tympanic membrane normal.      Left Ear: Tympanic membrane normal.      Nose: Nose normal.      Mouth/Throat:      Mouth: Mucous membranes are moist.      Pharynx: No posterior oropharyngeal erythema. Eyes:      Conjunctiva/sclera: Conjunctivae normal.      Pupils: Pupils are equal, round, and reactive to light. Cardiovascular:      Rate and Rhythm: Normal rate and regular rhythm. Pulses: Normal pulses. Heart sounds: Normal heart sounds. No murmur heard. Pulmonary:      Effort: Pulmonary effort is normal. No tachypnea, bradypnea or respiratory distress. Breath sounds: Normal breath sounds. No stridor. No wheezing or rhonchi. Abdominal:      General: Bowel sounds are normal. There is no distension. Palpations: Abdomen is soft. Tenderness: There is no abdominal tenderness. There is no guarding. Musculoskeletal:         General: No deformity or signs of injury. Normal range of motion. Cervical back: Normal range of motion. Lymphadenopathy:      Cervical: No cervical adenopathy. Skin:     General: Skin is warm and dry. Capillary Refill: Capillary refill takes less than 2 seconds. Findings: No erythema or rash. Neurological:      General: No focal deficit present. Mental Status: She is alert and oriented to person, place, and time. Mental status is at baseline. Motor: No weakness. Coordination: Coordination normal.   Psychiatric:         Attention and Perception: Attention and perception normal.         Mood and Affect: Mood and affect normal.         Speech: Speech normal.         Behavior: Behavior normal. Behavior is cooperative. Thought Content:  Thought content normal.         Judgment: Judgment normal.         Assessment:       Diagnosis Orders   1. Exposure to COVID-19 virus  COVID-19         Plan:      Orders Placed This Encounter   Procedures    COVID-19     Standing Status:   Future     Standing Expiration Date:   5/7/2023     Scheduling Instructions:      1) Due to current limited availability of the COVID-19 test, tests will be prioritized based on responses to questions above. Testing may be delayed due to volume. 2) Print and instruct patient to adhere to CDC home isolation program. (Link Above)              3) Set up or refer patient for a monitoring program.              4) Have patient sign up for and leverage MyChart (if not previously done). Order Specific Question:   Is this test for diagnosis or screening? Answer:   Screening     Order Specific Question:   Symptomatic for COVID-19 as defined by CDC? Answer:   No     Order Specific Question:   Date of Symptom Onset     Answer:   N/A     Order Specific Question:   Hospitalized for COVID-19? Answer:   No     Order Specific Question:   Admitted to ICU for COVID-19? Answer:   No     Order Specific Question:   Employed in healthcare setting? Answer:   No     Order Specific Question:   Resident in a congregate (group) care setting? Answer:   No     Order Specific Question:   Pregnant? Answer:   No     Order Specific Question:   Previously tested for COVID-19? Answer:   Yes     No orders of the defined types were placed in this encounter. Pt here today and requested a Covid PCR test after she states she was away with family 2 weeks and one of the family members tested + and pt nervous and would like tested. Pt declines any s/s of covid at this time. Pt was advised we will call her with the result when it comes back. PT verbalized understanding. Pt left the [facility] in stable condition. Return if symptoms worsen or fail to improve. Reviewed with the patient: current clinical status, medications, activities and diet.      Side effects, adverse effects of the medication prescribed today, as well as treatment plan and result expectations have been discussed with the patient who expresses understanding and desires to proceed. Close follow up to evaluate treatment results and for coordination of care. I have reviewed the patient's medical history in detail and updated the computerized patient record.       LIZ Purcell - CNP

## 2022-05-09 ENCOUNTER — TELEPHONE (OUTPATIENT)
Dept: FAMILY MEDICINE CLINIC | Age: 77
End: 2022-05-09

## 2022-05-09 DIAGNOSIS — Z20.822 EXPOSURE TO COVID-19 VIRUS: ICD-10-CM

## 2022-05-09 LAB — SARS-COV-2, PCR: NOT DETECTED

## 2022-05-17 ENCOUNTER — OFFICE VISIT (OUTPATIENT)
Dept: GASTROENTEROLOGY | Age: 77
End: 2022-05-17
Payer: MEDICARE

## 2022-05-17 VITALS
OXYGEN SATURATION: 99 % | DIASTOLIC BLOOD PRESSURE: 58 MMHG | HEART RATE: 55 BPM | SYSTOLIC BLOOD PRESSURE: 116 MMHG | HEIGHT: 61 IN | BODY MASS INDEX: 26.01 KG/M2 | WEIGHT: 137.8 LBS

## 2022-05-17 DIAGNOSIS — K62.5 RECTAL BLEEDING: Primary | ICD-10-CM

## 2022-05-17 PROCEDURE — 99203 OFFICE O/P NEW LOW 30 MIN: CPT | Performed by: SPECIALIST

## 2022-05-17 RX ORDER — SODIUM, POTASSIUM,MAG SULFATES 17.5-3.13G
SOLUTION, RECONSTITUTED, ORAL ORAL
Qty: 354 ML | Refills: 0 | Status: SHIPPED | OUTPATIENT
Start: 2022-05-17

## 2022-05-17 ASSESSMENT — ENCOUNTER SYMPTOMS
ABDOMINAL PAIN: 0
DIARRHEA: 0
CONSTIPATION: 0
ANAL BLEEDING: 0
RESPIRATORY NEGATIVE: 1
BLOOD IN STOOL: 1
EYES NEGATIVE: 1
NAUSEA: 0
ABDOMINAL DISTENTION: 0
VOMITING: 0
RECTAL PAIN: 0

## 2022-05-17 NOTE — PROGRESS NOTES
Gastroenterology Clinic Visit    Tomasa Zapata  [id number]  Chief Complaint   Patient presents with    New Patient     last colonoscopy 09/12/16    Hemorrhoids    Rectal Bleeding       HPI: 68 y.o. female presents to the clinic with history of intermittent rectal bleeding for the last 6 months.,  No change in bowel habits, last colonoscopy was in 2016 which showed hemorrhoid and a benign colon polyp, patient's grandfather had colon cancer, no abdominal pain no nausea or vomiting, history of voluntary weight loss with increased physical activity. Social history does not smoke drinks alcohol very socially, surgical history includes hysterectomy    Review of Systems   Constitutional: Negative. HENT: Negative. Eyes: Negative. Respiratory: Negative. Cardiovascular: Negative. History of hypertension under control   Gastrointestinal: Positive for blood in stool. Negative for abdominal distention, abdominal pain, anal bleeding, constipation, diarrhea, nausea, rectal pain and vomiting. Endocrine: Negative. Genitourinary: Negative. Musculoskeletal: Positive for arthralgias. Skin: Negative. Allergic/Immunologic: Negative for food allergies. Neurological: Negative. Hematological: Negative. Psychiatric/Behavioral: Negative.          Past Medical History:   Diagnosis Date    Abnormal EKG     BPPV (benign paroxysmal positional vertigo)     Colon polyps 09/2016    needs f/u in 5 years    Elevated liver enzymes 11/5/2013    Family history of heart disease     Female bladder prolapse     Heart murmur     History of bone density study 4/7/11    History of mammography, diagnostic 4/13/11    Category 3 Left mammogram/ Category 2 Right mammogram    Hyperlipidemia     on meds > 10 yrs    Hypertension     on meds > 10 yrs    Hypothyroidism 1/9/2021    Internal hemorrhoids     Normal cardiac stress test 12/09/2015    Osteoarthritis, multiple sites     shoulders     Osteopenia 06/09/2015    -1.4; -1.5 9/2018    Recurrent cold sores     Rheumatic fever     Trigger thumb of left hand       Past Surgical History:   Procedure Laterality Date    COLONOSCOPY  7/12/2006    COLONOSCOPY  09/12/2016    EN MYERS MD    HYSTERECTOMY VAGINAL N/A 2/14/2019    [facility] BSO performed by Iron Andres MD at [address] Left 5/9/2017    LEFT HAND  FINGER TRIGGER RELEASE THUMB, SUPINE  performed by Cynthia Reyna MD at [address] [facility] N/A 2/14/2019    USLS POSSIBLE SSLS performed by Iron Andres MD at [facility] N/A 2/14/2019    A-P REPAIR performed by Iron Andres MD at [facility]     Current Outpatient Medications on File Prior to Visit   Medication Sig Dispense Refill    fenofibrate (TRIGLIDE) 160 MG tablet Take 1 tablet by mouth daily (Patient taking differently: Take 80 mg by mouth daily ) 90 tablet 1    carvedilol (COREG) 6.25 MG tablet TAKE 1 TABLET TWICE A  tablet 3    levothyroxine (SYNTHROID) 50 MCG tablet Take 1 tablet by mouth Daily 90 tablet 3    acyclovir (ZOVIRAX) 5 % ointment Apply 6 times per day for 7 day 30 g 0    Multiple Vitamins-Minerals (MULTIPLE VITAMINS/WOMENS PO) Take  by mouth daily.  estradiol (ESTRACE VAGINAL) 0.1 MG/GM vaginal cream Place 1 g vaginally daily (Patient not taking: Reported on 5/17/2022) 42.5 g 1    aspirin 81 MG EC tablet Take 81 mg by mouth daily   (Patient not taking: Reported on 5/17/2022)       No current facility-administered medications on file prior to visit.      Family History   Problem Relation Age of Onset    Asthma Father     Diabetes Father     Heart Disease Mother     Diabetes Brother     Heart Disease Brother         heart surgery    Kidney Disease Brother     Other Brother         psoriasis    Other Daughter         shoulder surgery, gum surgeries    Asthma Daughter         multiple allergies    Arthritis Sister     High Cholesterol Sister Social History     Socioeconomic History    Marital status: Single     Spouse name: None    Number of children: 2    Years of education: None    Highest education level: None   Occupational History    Occupation: retired   Tobacco Use    Smoking status: Former Smoker     Packs/day: 0.50     Years: 20.00     Pack years: 10.00     Types: Cigarettes     Quit date: 2004     Years since quittin.7    Smokeless tobacco: Never Used    Tobacco comment: quit in her 42's   Vaping Use    Vaping Use: Never used   Substance and Sexual Activity    Alcohol use: Yes     Alcohol/week: 0.0 standard drinks     Comment: wine 2 glasses per week    Drug use: No    Sexual activity: None   Other Topics Concern    None   Social History Narrative    Has children that live in [place]     Social Determinants of Health     Financial Resource Strain: Low Risk     Difficulty of Paying Living Expenses: Not hard at all   Food Insecurity: No Food Insecurity    Worried About [facility] in the Last Year: Never true    [address] [facility] [address] [employer] in the Last Year: Never true   Transportation Needs:     Lack of Transportation (Medical): Not on file    Lack of Transportation (Non-Medical):  Not on file   Physical Activity:     Days of Exercise per Week: Not on file    Minutes of Exercise per Session: Not on file   Stress:     Feeling of Stress : Not on file   Social Connections:     Frequency of Communication with Friends and Family: Not on file    Frequency of Social Gatherings with Friends and Family: Not on file    Attends Sikhism Services: Not on file    Active Member of Clubs or Organizations: Not on file    Attends Club or Organization Meetings: Not on file    Marital Status: Not on file   Intimate Partner Violence:     Fear of Current or Ex-Partner: Not on file    Emotionally Abused: Not on file    Physically Abused: Not on file    Sexually Abused: Not on file   Housing Stability:     Unable to Pay for Housing in the Last Year: Not on file    Number of Places Lived in the Last Year: Not on file    Unstable Housing in the Last Year: Not on file       Blood pressure (!) 116/58, pulse 55, height 5' 1\" (1.549 m), weight 137 lb 12.8 oz (62.5 kg), SpO2 99 %, not currently breastfeeding. Physical Exam  Constitutional:       Appearance: She is well-developed. HENT:      Head: Normocephalic and atraumatic. Eyes:      Conjunctiva/sclera: Conjunctivae normal.      Pupils: Pupils are equal, round, and reactive to light. Cardiovascular:      Rate and Rhythm: Normal rate. Pulmonary:      Effort: Pulmonary effort is normal.   Abdominal:      General: Bowel sounds are normal.      Palpations: Abdomen is soft. Comments: Soft nontender no palpable mass   Musculoskeletal:         General: Normal range of motion. Cervical back: Normal range of motion. Skin:     General: Skin is warm. Neurological:      Mental Status: She is alert. Laboratory, Pathology, Radiology reviewed indetail with relevant important investigations summarized below:  Lab Results   Component Value Date    WBC 5.5 01/14/2022    HGB 15.4 01/14/2022    HCT 47.3 (H) 01/14/2022    MCV 92.3 01/14/2022     01/14/2022     Lab Results   Component Value Date    ALT 29 05/27/2020    AST 26 05/27/2020    ALKPHOS 64 05/27/2020    BILITOT <0.2 05/27/2020       No results found. Endoscopic investigations:     Assessmentand Plan:  68 y.o. female with history of intermittent rectal bleeding and past history of colon polyp, last colonoscopy was in 2016. Schedule colonoscopy   Diagnosis Orders   1. Rectal bleeding  Endoscopy, colon, diagnostic     Return in about 2 months (around 7/17/2022).     Madonna Kyle MD   Staff Gastroenterologist  [facility]    Please note this report has been partially produced using speech recognition software and may cause contain errors related to thatsystem including grammar, punctuation and spelling as well as words and phrases that may seem inappropriate. If there are questions or concerns please feel free to contact me to clarify.

## 2022-06-08 ENCOUNTER — ANESTHESIA EVENT (OUTPATIENT)
Dept: ENDOSCOPY | Age: 77
End: 2022-06-08
Payer: MEDICARE

## 2022-06-09 ENCOUNTER — ANESTHESIA (OUTPATIENT)
Dept: ENDOSCOPY | Age: 77
End: 2022-06-09
Payer: MEDICARE

## 2022-06-09 ENCOUNTER — HOSPITAL ENCOUNTER (OUTPATIENT)
Age: 77
Setting detail: OUTPATIENT SURGERY
Discharge: HOME | End: 2022-06-09
Attending: SPECIALIST | Admitting: SPECIALIST
Payer: MEDICARE

## 2022-06-09 ENCOUNTER — ANCILLARY PROCEDURE (OUTPATIENT)
Dept: ENDOSCOPY | Age: 77
End: 2022-06-09
Attending: SPECIALIST
Payer: MEDICARE

## 2022-06-09 VITALS
WEIGHT: 135 LBS | HEIGHT: 61 IN | RESPIRATION RATE: 16 BRPM | TEMPERATURE: 97.6 F | OXYGEN SATURATION: 96 % | HEART RATE: 51 BPM | BODY MASS INDEX: 25.49 KG/M2 | SYSTOLIC BLOOD PRESSURE: 130 MMHG | DIASTOLIC BLOOD PRESSURE: 63 MMHG

## 2022-06-09 PROCEDURE — 2709999900 HC NON-CHARGEABLE SUPPLY: Performed by: SPECIALIST

## 2022-06-09 PROCEDURE — 3700000000 HC ANESTHESIA ATTENDED CARE: Performed by: SPECIALIST

## 2022-06-09 PROCEDURE — 2580000003 HC RX 258: Performed by: SPECIALIST

## 2022-06-09 PROCEDURE — 6370000000 HC RX 637 (ALT 250 FOR IP): Performed by: SPECIALIST

## 2022-06-09 PROCEDURE — 6360000002 HC RX W HCPCS: Performed by: NURSE ANESTHETIST, CERTIFIED REGISTERED

## 2022-06-09 PROCEDURE — 7100000011 HC PHASE II RECOVERY - ADDTL 15 MIN: Performed by: SPECIALIST

## 2022-06-09 PROCEDURE — 2580000003 HC RX 258

## 2022-06-09 PROCEDURE — 45378 DIAGNOSTIC COLONOSCOPY: CPT | Performed by: SPECIALIST

## 2022-06-09 PROCEDURE — 3700000001 HC ADD 15 MINUTES (ANESTHESIA): Performed by: SPECIALIST

## 2022-06-09 PROCEDURE — 7100000010 HC PHASE II RECOVERY - FIRST 15 MIN: Performed by: SPECIALIST

## 2022-06-09 PROCEDURE — 3609027000 HC COLONOSCOPY: Performed by: SPECIALIST

## 2022-06-09 PROCEDURE — 2500000003 HC RX 250 WO HCPCS: Performed by: NURSE ANESTHETIST, CERTIFIED REGISTERED

## 2022-06-09 RX ORDER — SODIUM CHLORIDE 0.9 % (FLUSH) 0.9 %
5-40 SYRINGE (ML) INJECTION PRN
Status: DISCONTINUED | OUTPATIENT
Start: 2022-06-09 | End: 2022-06-09 | Stop reason: HOSPADM

## 2022-06-09 RX ORDER — PROPOFOL 10 MG/ML
INJECTION, EMULSION INTRAVENOUS PRN
Status: DISCONTINUED | OUTPATIENT
Start: 2022-06-09 | End: 2022-06-09 | Stop reason: SDUPTHER

## 2022-06-09 RX ORDER — SODIUM CHLORIDE 9 MG/ML
INJECTION, SOLUTION INTRAVENOUS
Status: COMPLETED
Start: 2022-06-09 | End: 2022-06-09

## 2022-06-09 RX ORDER — SODIUM CHLORIDE 0.9 % (FLUSH) 0.9 %
5-40 SYRINGE (ML) INJECTION EVERY 12 HOURS SCHEDULED
Status: DISCONTINUED | OUTPATIENT
Start: 2022-06-09 | End: 2022-06-09 | Stop reason: HOSPADM

## 2022-06-09 RX ORDER — SODIUM CHLORIDE 9 MG/ML
INJECTION, SOLUTION INTRAVENOUS PRN
Status: DISCONTINUED | OUTPATIENT
Start: 2022-06-09 | End: 2022-06-09 | Stop reason: HOSPADM

## 2022-06-09 RX ORDER — LIDOCAINE HYDROCHLORIDE 20 MG/ML
INJECTION, SOLUTION INFILTRATION; PERINEURAL PRN
Status: DISCONTINUED | OUTPATIENT
Start: 2022-06-09 | End: 2022-06-09 | Stop reason: SDUPTHER

## 2022-06-09 RX ORDER — ONDANSETRON 2 MG/ML
4 INJECTION INTRAMUSCULAR; INTRAVENOUS
Status: DISCONTINUED | OUTPATIENT
Start: 2022-06-09 | End: 2022-06-09 | Stop reason: HOSPADM

## 2022-06-09 RX ORDER — LIDOCAINE HYDROCHLORIDE 10 MG/ML
1 INJECTION, SOLUTION EPIDURAL; INFILTRATION; INTRACAUDAL; PERINEURAL
Status: DISCONTINUED | OUTPATIENT
Start: 2022-06-09 | End: 2022-06-09 | Stop reason: HOSPADM

## 2022-06-09 RX ORDER — SODIUM CHLORIDE 9 MG/ML
INJECTION, SOLUTION INTRAVENOUS CONTINUOUS
Status: DISCONTINUED | OUTPATIENT
Start: 2022-06-09 | End: 2022-06-09 | Stop reason: HOSPADM

## 2022-06-09 RX ORDER — MAGNESIUM HYDROXIDE 1200 MG/15ML
LIQUID ORAL PRN
Status: DISCONTINUED | OUTPATIENT
Start: 2022-06-09 | End: 2022-06-09 | Stop reason: ALTCHOICE

## 2022-06-09 RX ORDER — SIMETHICONE 20 MG/.3ML
EMULSION ORAL PRN
Status: DISCONTINUED | OUTPATIENT
Start: 2022-06-09 | End: 2022-06-09 | Stop reason: ALTCHOICE

## 2022-06-09 RX ADMIN — LIDOCAINE HYDROCHLORIDE 60 MG: 20 INJECTION, SOLUTION INFILTRATION; PERINEURAL at 11:23

## 2022-06-09 RX ADMIN — SODIUM CHLORIDE: 9 INJECTION, SOLUTION INTRAVENOUS at 10:43

## 2022-06-09 RX ADMIN — PROPOFOL 410 MG: 10 INJECTION, EMULSION INTRAVENOUS at 11:23

## 2022-06-09 ASSESSMENT — PAIN SCALES - GENERAL
PAINLEVEL_OUTOF10: 0
PAINLEVEL_OUTOF10: 0

## 2022-06-09 ASSESSMENT — PAIN - FUNCTIONAL ASSESSMENT: PAIN_FUNCTIONAL_ASSESSMENT: 0-10

## 2022-06-09 NOTE — ANESTHESIA PRE PROCEDURE
Department of Anesthesiology  Preprocedure Note       Name:  Serge Mendez   Age:  68 y.o.  :  1945                                          MRN:  [id number]         Date:  2022      Surgeon: Barbie Valencia):  Kory Mcintyre MD    Procedure: Procedure(s):  COLONOSCOPY DIAGNOSTIC    Medications prior to admission:   Prior to Admission medications    Medication Sig Start Date End Date Taking? Authorizing Provider   Na Sulfate-K Sulfate-Mg Sulf (SUPREP) 17.5-3.13-1.6 GM/177ML SOLN solution As directed  Patient not taking: Reported on 2022   Kory Mcintyre MD   fenofibrate (TRIGLIDE) 160 MG tablet Take 1 tablet by mouth daily  Patient taking differently: Take 80 mg by mouth daily  22   Anurag De Dios MD   carvedilol (COREG) 6.25 MG tablet TAKE 1 TABLET TWICE A DAY 22   Anurag De Dios MD   levothyroxine (SYNTHROID) 50 MCG tablet Take 1 tablet by mouth Daily 22   Anurag De Dios MD   estradiol (ESTRACE VAGINAL) 0.1 MG/GM vaginal cream Place 1 g vaginally daily  Patient not taking: Reported on 2022   Anurag De Dios MD   acyclovir (ZOVIRAX) 5 % ointment Apply 6 times per day for 7 day 22   Anurag De Dios MD   aspirin 81 MG EC tablet Take 81 mg by mouth daily    Patient not taking: Reported on 2022    Historical Provider, MD   Multiple Vitamins-Minerals (MULTIPLE VITAMINS/WOMENS PO) Take  by mouth daily.       Historical Provider, MD       Current medications:    Current Facility-Administered Medications   Medication Dose Route Frequency Provider Last Rate Last Admin    sodium chloride 0.9 % infusion                Allergies:  No Known Allergies    Problem List:    Patient Active Problem List   Diagnosis Code    Hypertension I10    Hyperlipidemia E78.5    BPPV (benign paroxysmal positional vertigo) H81.10    Recurrent cold sores B00.1    Osteoarthritis, multiple sites M15.9    Female bladder prolapse N81.10    Thickened endometrium R93.89    Cystocele with rectocele N81.10, N81.6    POP-Q stage 3 cystocele N81.10    Incomplete uterine prolapse N81.2    POP-Q stage 2 rectocele N81.6    Actinic keratosis L57.0    Closed comedone L70.0    Comedone L70.0    Disseminated superficial actinic porokeratosis L56.5    DSAP (disseminated superficial actinic porokeratosis) L56.5    Hemangioma of skin D18.01    Inflamed seborrheic keratosis L82.0    Porokeratosis Q82.8    Seborrheic keratosis L82.1    Senile sebaceous gland hyperplasia L73.8    Shoulder pain, left M25.512    Skin tag L91.8    ST (skin tag) L91.8    Syringoma D23.9    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    Vaginal vault prolapse after hysterectomy N99.3    Uterine prolapse N81.4    Pelvic organ prolapse quantification stage 3 cystocele N81.10    Adenoma polyp of asc colon scope 2016-sessile D12.2    Grade II internal hemorrhoids scope 2016 confirmed K64.1    Hypothyroidism E03.9       Past Medical History:        Diagnosis Date    Abnormal EKG     BPPV (benign paroxysmal positional vertigo)     Colon polyps 09/2016    needs f/u in 5 years    Elevated liver enzymes 11/5/2013    Family history of heart disease     Female bladder prolapse     Heart murmur     History of bone density study 4/7/11    History of mammography, diagnostic 4/13/11    Category 3 Left mammogram/ Category 2 Right mammogram    Hyperlipidemia     on meds > 10 yrs    Hypertension     on meds > 10 yrs    Hypothyroidism 1/9/2021    Internal hemorrhoids     Normal cardiac stress test 12/09/2015    Osteoarthritis, multiple sites     shoulders     Osteopenia 06/09/2015    -1.4; -1.5 9/2018    Recurrent cold sores     Rheumatic fever     Trigger thumb of left hand        Past Surgical History:        Procedure Laterality Date    COLONOSCOPY  7/12/2006    COLONOSCOPY  09/12/2016    EN MYERS MD    HYSTERECTOMY VAGINAL N/A 2/14/2019    [facility] BSO performed by Vlad Mcgee MD at [address] [place] [address] [place] FINGER TENDON SHEATH Left 2017    LEFT HAND  FINGER TRIGGER RELEASE THUMB, SUPINE  performed by Cynthia Reyna MD at [address]  N/A 2019    USLS POSSIBLE SSLS performed by Iron Andres MD at [address] [facility] N/A 2019    A-P REPAIR performed by Iron Andres MD at [facility] [address] History:    Social History     Tobacco Use    Smoking status: Former Smoker     Packs/day: 0.50     Years: 20.00     Pack years: 10.00     Types: Cigarettes     Quit date: 2004     Years since quittin.8    Smokeless tobacco: Never Used    Tobacco comment: quit in her 42's   Substance Use Topics    Alcohol use: Yes     Alcohol/week: 0.0 standard drinks     Comment: wine 2 glasses per week                                Counseling given: Not Answered  Comment: quit in her 40's      Vital Signs (Current): There were no vitals filed for this visit.                                            BP Readings from Last 3 Encounters:   22 (!) 116/58   22 110/70   22 112/64       NPO Status:                                                                                 BMI:   Wt Readings from Last 3 Encounters:   22 137 lb 12.8 oz (62.5 kg)   22 137 lb (62.1 kg)   22 137 lb 9.6 oz (62.4 kg)     There is no height or weight on file to calculate BMI.    CBC:   Lab Results   Component Value Date    WBC 5.5 2022    RBC 5.12 2022    HGB 15.4 2022    HCT 47.3 2022    MCV 92.3 2022    RDW 13.9 2022     2022       CMP:   Lab Results   Component Value Date     2022    K 4.7 2022     2022    CO2 27 2022    BUN 16 2022    CREATININE 0.73 2022    GFRAA >60.0 2022    LABGLOM >60.0 2022    GLUCOSE 95 2022    PROT 7.1 2020    CALCIUM 10.1 2022    BILITOT <0.2 2020    ALKPHOS 64 2020    AST 26 2020    ALT 29 2020       POC Tests: No results for input(s): POCGLU, POCNA, POCK, POCCL, POCBUN, POCHEMO, POCHCT in the last 72 hours. Coags:   Lab Results   Component Value Date    PROTIME 11.0 09/25/2018    INR 1.1 09/25/2018    APTT 27.1 09/25/2018       HCG (If Applicable): No results found for: PREGTESTUR, PREGSERUM, HCG, HCGQUANT     ABGs: No results found for: PHART, PO2ART, WNP9NDO, USC6QDH, BEART, H3ZQHLCW     Type & Screen (If Applicable):  No results found for: LABABO, LABRH    Drug/Infectious Status (If Applicable):  No results found for: HIV, HEPCAB    COVID-19 Screening (If Applicable):   Lab Results   Component Value Date    COVID19 Not Detected 05/09/2022           Anesthesia Evaluation  Patient summary reviewed and Nursing notes reviewed no history of anesthetic complications:   Airway: Mallampati: II  TM distance: >3 FB   Neck ROM: full  Mouth opening: > = 3 FB   Dental: normal exam         Pulmonary:       Smoker: former. Cardiovascular:    (+) hypertension:, hyperlipidemia      ECG reviewed        Stress test reviewed                Neuro/Psych:               GI/Hepatic/Renal:             Endo/Other:    (+) hypothyroidism: arthritis: OA., .                 Abdominal:             Vascular: Other Findings:           Anesthesia Plan      MAC     ASA 2       Induction: intravenous. Anesthetic plan and risks discussed with patient.                         LIZ Ramirez CRNA   6/9/2022

## 2022-06-09 NOTE — H&P
7 day 1/25/22   Attila Keith MD   aspirin 81 MG EC tablet Take 81 mg by mouth daily    Patient not taking: Reported on 5/17/2022    Historical Provider, MD   Multiple Vitamins-Minerals (MULTIPLE VITAMINS/WOMENS PO) Take  by mouth daily.       Historical Provider, MD       Allergies: No Known Allergies     History of allergic reaction to anesthesia:  No    Past Medical History:  Past Medical History:   Diagnosis Date    Abnormal EKG     BPPV (benign paroxysmal positional vertigo)     Colon polyps 09/2016    needs f/u in 5 years    Elevated liver enzymes 11/5/2013    Family history of heart disease     Female bladder prolapse     Heart murmur     History of bone density study 4/7/11    History of mammography, diagnostic 4/13/11    Category 3 Left mammogram/ Category 2 Right mammogram    Hyperlipidemia     on meds > 10 yrs    Hypertension     on meds > 10 yrs    Hypothyroidism 1/9/2021    Internal hemorrhoids     Normal cardiac stress test 12/09/2015    Osteoarthritis, multiple sites     shoulders     Osteopenia 06/09/2015    -1.4; -1.5 9/2018    Recurrent cold sores     Rheumatic fever     Trigger thumb of left hand        Past Surgical History:  Past Surgical History:   Procedure Laterality Date    COLONOSCOPY  7/12/2006    COLONOSCOPY  09/12/2016    EN MYERS MD    HYSTERECTOMY VAGINAL N/A 2/14/2019    [facility] BSO performed by Charisse Baker MD at [address] Left 5/9/2017    LEFT HAND  FINGER TRIGGER RELEASE THUMB, SUPINE  performed by Trent Bean MD at [address] [facility] N/A 2/14/2019    USLS POSSIBLE SSLS performed by Charisse Baker MD at [address] [facility] N/A 2/14/2019    A-P REPAIR performed by Charisse Baker MD at [facility] [address] History:  Social History     Tobacco Use    Smoking status: Former Smoker     Packs/day: 0.50     Years: 20.00     Pack years: 10.00     Types: Cigarettes     Quit date: 8/13/2004     Years since quittin.8    Smokeless tobacco: Never Used    Tobacco comment: quit in her 42's   Vaping Use    Vaping Use: Never used   Substance Use Topics    Alcohol use: Yes     Alcohol/week: 0.0 standard drinks     Comment: wine 2 glasses per week    Drug use: No       Vital Signs:   Vitals:    22 1037   BP: (!) 150/68   Pulse: 52   Resp: 18   Temp: 97.6 °F (36.4 °C)   SpO2: 96%        Physical Exam:  Cardiac:  [x]WNL  []Comments:  Pulmonary:  [x]WNL   []Comments:   Neuro/Mental Status:  [x]WNL  []Comments:  Abdominal:  [x]WNL    []Comments:  Other:   []WNL  []Comments:    Informed Consent:  The risks and benefits of the procedure have been discussed with either the patient or if they cannot consent, their representative. Assessment:  Patient examined and appropriate for planned sedation and procedure. Plan:  Proceed with planned sedation and procedure as above.     Darlyn Madison MD  11:21 AM

## 2022-06-09 NOTE — ANESTHESIA POSTPROCEDURE EVALUATION
Department of Anesthesiology  Postprocedure Note    Patient: Yoly Downs  MRN: [id number]  YOB: 1945  Date of evaluation: 6/9/2022  Time:  11:51 AM     Procedure Summary     Date: 06/09/22 Room / Location: [address] [place] [address]    Anesthesia Start: 6821 Anesthesia Stop: 3801    Procedure: COLONOSCOPY DIAGNOSTIC (N/A ) Diagnosis: (rectal bleeding;  K62.5)    Surgeons: Anabel Beal MD Responsible Provider: LIZ Kilpatrick CRNA    Anesthesia Type: MAC ASA Status: 2          Anesthesia Type: No value filed. Emre Phase I: Emre Score: 10    Emre Phase II:      Last vitals: Reviewed and per EMR flowsheets.        Anesthesia Post Evaluation    Patient location during evaluation: PACU  Patient participation: complete - patient participated  Level of consciousness: sleepy but conscious  Pain score: 0  Airway patency: patent  Nausea & Vomiting: no nausea and no vomiting  Complications: no  Cardiovascular status: hemodynamically stable  Respiratory status: acceptable  Hydration status: euvolemic

## 2022-08-29 SDOH — HEALTH STABILITY: PHYSICAL HEALTH: ON AVERAGE, HOW MANY MINUTES DO YOU ENGAGE IN EXERCISE AT THIS LEVEL?: 60 MIN

## 2022-08-29 SDOH — HEALTH STABILITY: PHYSICAL HEALTH: ON AVERAGE, HOW MANY DAYS PER WEEK DO YOU ENGAGE IN MODERATE TO STRENUOUS EXERCISE (LIKE A BRISK WALK)?: 5 DAYS

## 2022-08-29 ASSESSMENT — LIFESTYLE VARIABLES
HOW MANY STANDARD DRINKS CONTAINING ALCOHOL DO YOU HAVE ON A TYPICAL DAY: 1 OR 2
HOW OFTEN DO YOU HAVE A DRINK CONTAINING ALCOHOL: 2-4 TIMES A MONTH
HOW OFTEN DO YOU HAVE SIX OR MORE DRINKS ON ONE OCCASION: 1
HOW OFTEN DO YOU HAVE A DRINK CONTAINING ALCOHOL: 3
HOW MANY STANDARD DRINKS CONTAINING ALCOHOL DO YOU HAVE ON A TYPICAL DAY: 1

## 2022-08-29 ASSESSMENT — PATIENT HEALTH QUESTIONNAIRE - PHQ9
SUM OF ALL RESPONSES TO PHQ QUESTIONS 1-9: 0
1. LITTLE INTEREST OR PLEASURE IN DOING THINGS: 0
SUM OF ALL RESPONSES TO PHQ9 QUESTIONS 1 & 2: 0
SUM OF ALL RESPONSES TO PHQ QUESTIONS 1-9: 0
2. FEELING DOWN, DEPRESSED OR HOPELESS: 0

## 2022-08-31 ENCOUNTER — OFFICE VISIT (OUTPATIENT)
Dept: FAMILY MEDICINE CLINIC | Age: 77
End: 2022-08-31
Payer: MEDICARE

## 2022-08-31 VITALS
BODY MASS INDEX: 25.83 KG/M2 | TEMPERATURE: 97.8 F | SYSTOLIC BLOOD PRESSURE: 132 MMHG | WEIGHT: 136.8 LBS | HEIGHT: 61 IN | DIASTOLIC BLOOD PRESSURE: 64 MMHG | HEART RATE: 51 BPM | OXYGEN SATURATION: 98 %

## 2022-08-31 DIAGNOSIS — Z00.00 MEDICARE ANNUAL WELLNESS VISIT, SUBSEQUENT: Primary | ICD-10-CM

## 2022-08-31 DIAGNOSIS — E78.2 MIXED HYPERLIPIDEMIA: ICD-10-CM

## 2022-08-31 DIAGNOSIS — L29.9 ITCHING: ICD-10-CM

## 2022-08-31 DIAGNOSIS — R73.03 PREDIABETES: ICD-10-CM

## 2022-08-31 LAB — HBA1C MFR BLD: 5.4 %

## 2022-08-31 PROCEDURE — G0439 PPPS, SUBSEQ VISIT: HCPCS | Performed by: FAMILY MEDICINE

## 2022-08-31 PROCEDURE — 83036 HEMOGLOBIN GLYCOSYLATED A1C: CPT | Performed by: FAMILY MEDICINE

## 2022-08-31 PROCEDURE — 1123F ACP DISCUSS/DSCN MKR DOCD: CPT | Performed by: FAMILY MEDICINE

## 2022-08-31 PROCEDURE — 99214 OFFICE O/P EST MOD 30 MIN: CPT | Performed by: FAMILY MEDICINE

## 2022-08-31 RX ORDER — FENOFIBRATE 160 MG/1
160 TABLET ORAL DAILY
Qty: 90 TABLET | Refills: 1 | Status: SHIPPED | OUTPATIENT
Start: 2022-08-31

## 2022-08-31 SDOH — ECONOMIC STABILITY: FOOD INSECURITY: WITHIN THE PAST 12 MONTHS, THE FOOD YOU BOUGHT JUST DIDN'T LAST AND YOU DIDN'T HAVE MONEY TO GET MORE.: NEVER TRUE

## 2022-08-31 SDOH — ECONOMIC STABILITY: FOOD INSECURITY: WITHIN THE PAST 12 MONTHS, YOU WORRIED THAT YOUR FOOD WOULD RUN OUT BEFORE YOU GOT MONEY TO BUY MORE.: NEVER TRUE

## 2022-08-31 ASSESSMENT — ENCOUNTER SYMPTOMS
CHEST TIGHTNESS: 0
ABDOMINAL PAIN: 0
PHOTOPHOBIA: 0
SHORTNESS OF BREATH: 0
ABDOMINAL DISTENTION: 0

## 2022-08-31 ASSESSMENT — SOCIAL DETERMINANTS OF HEALTH (SDOH): HOW HARD IS IT FOR YOU TO PAY FOR THE VERY BASICS LIKE FOOD, HOUSING, MEDICAL CARE, AND HEATING?: NOT HARD AT ALL

## 2022-08-31 NOTE — PROGRESS NOTES
Medicare Annual Wellness Visit    Jeff Johnston is here for Medicare AWV and Discuss Medications (Need malaria medication going to [facility] )    Assessment & Plan   Medicare annual wellness visit, subsequent  Mixed hyperlipidemia  -     fenofibrate (TRIGLIDE) 160 MG tablet; Take 1 tablet by mouth daily, Disp-90 tablet, R-1Normal  Prediabetes  -     POCT glycosylated hemoglobin (Hb A1C)  Itching    Recommendations for Preventive Services Due: see orders and patient instructions/AVS.  Recommended screening schedule for the next 5-10 years is provided to the patient in written form: see Patient Instructions/AVS.     Return for Medicare Annual Wellness Visit in 1 year. Subjective   The following acute and/or chronic problems were also addressed today:  Pt has been working on diet and activity and is happy to hear her HbA1c is down to 5.4.  see ROS    Patient's been noting a lot of itching increased with her rash. She is going to go back to dermatology but we talked about potential medications that can cause this as well. She also will be traveling and needs malaria prophylaxis. They are having a meeting with the group tomorrow to get more information about their needs. Patient's complete Health Risk Assessment and screening values have been reviewed and are found in Flowsheets. The following problems were reviewed today and where indicated follow up appointments were made and/or referrals ordered.     Positive Risk Factor Screenings with Interventions:             General Health and ACP:  General  In general, how would you say your health is?: Excellent  In the past 7 days, have you experienced any of the following: New or Increased Pain, New or Increased Fatigue, Loneliness, Social Isolation, Stress or Anger?: No  Do you get the social and emotional support that you need?: Yes  Do you have a Living Will?: Yes    Advance Directives       Power of  Living Will ACP-Advance Directive ACP-Power of [facility] Not on File Not on File Not on File Not on File          General Health Risk Interventions: This was addressed last year      Safety:  Do you have working smoke detectors?: Yes  Do you have any tripping hazards - loose or unsecured carpets or rugs?: No  Do you have any tripping hazards - clutter in doorways, halls, or stairs?: No  Do you have either shower bars, grab bars, non-slip mats or non-slip surfaces in your shower or bathtub?: (!) No  Do all of your stairways have a railing or banister?: (!) No  Do you always fasten your seatbelt when you are in a car?: Yes  Safety Interventions:  Home safety tips provided    Review of Systems   Constitutional:  Negative for activity change, appetite change, diaphoresis and unexpected weight change. Eyes:  Negative for photophobia and visual disturbance. Respiratory:  Negative for chest tightness and shortness of breath. No orthopnea   Cardiovascular:  Negative for chest pain, palpitations and leg swelling. Gastrointestinal:  Negative for abdominal distention and abdominal pain. Genitourinary:  Negative for flank pain and frequency. Musculoskeletal:  Negative for gait problem and joint swelling. Neurological:  Negative for dizziness, weakness, light-headedness and headaches. Psychiatric/Behavioral:  Negative for confusion. Physical Exam  Constitutional:       General: She is not in acute distress. Appearance: She is well-developed. She is not diaphoretic. HENT:      Head: Normocephalic and atraumatic. Right Ear: External ear normal.      Left Ear: External ear normal.      Nose: Nose normal.   Eyes:      General:         Right eye: No discharge. Left eye: No discharge. Conjunctiva/sclera: Conjunctivae normal.      Pupils: Pupils are equal, round, and reactive to light. Neck:      Thyroid: No thyromegaly. Trachea: No tracheal deviation. Cardiovascular:      Rate and Rhythm: Normal rate and regular rhythm. Pulmonary:      Effort: Pulmonary effort is normal. No respiratory distress. Abdominal:      General: There is no distension. Musculoskeletal:      Cervical back: Neck supple. Skin:     General: Skin is warm and dry. Findings: No bruising or rash. Neurological:      Mental Status: She is alert. Coordination: Coordination normal.   Psychiatric:         Thought Content: Thought content normal.         Judgment: Judgment normal.                  Objective   Vitals:    08/31/22 0856   BP: 132/64   Pulse: 51   Temp: 97.8 °F (36.6 °C)   SpO2: 98%   Weight: 136 lb 12.8 oz (62.1 kg)   Height: 5' 1\" (1.549 m)      Body mass index is 25.85 kg/m². No Known Allergies  Prior to Visit Medications    Medication Sig Taking? Authorizing Provider   fenofibrate (TRIGLIDE) 160 MG tablet Take 1 tablet by mouth daily Yes Katelynn Doshi MD   Na Sulfate-K Sulfate-Mg Sulf (SUPREP) 17.5-3.13-1.6 GM/177ML SOLN solution As directed Yes Wilton Luciano MD   carvedilol (COREG) 6.25 MG tablet TAKE 1 TABLET TWICE A DAY Yes Katelynn Doshi MD   levothyroxine (SYNTHROID) 50 MCG tablet Take 1 tablet by mouth Daily Yes Katelynn Doshi MD   estradiol (ESTRACE VAGINAL) 0.1 MG/GM vaginal cream Place 1 g vaginally daily Yes Katelynn Doshi MD   acyclovir (ZOVIRAX) 5 % ointment Apply 6 times per day for 7 day Yes Katelynn Doshi MD   Multiple Vitamins-Minerals (MULTIPLE VITAMINS/WOMENS PO) Take  by mouth daily.    Yes Historical Provider, MD Red (Including outside providers/suppliers regularly involved in providing care):   Patient Care Team:  Katelynn Doshi MD as PCP - General (Family Medicine)  Katelynn Doshi MD as PCP - [facility] Empaneled Provider  Jem Eldridge MD as Consulting Physician (Internal Medicine)     Reviewed and updated this visit:  Tobacco  Allergies  Meds  Med Hx  Surg Hx  Soc Hx  Fam Hx

## 2022-08-31 NOTE — PATIENT INSTRUCTIONS
Patient will continue diet and activity for control of blood sugar at this time hemoglobin A1c is demonstrating good control. Early January labs were all normal consider yearly labs. Patient is going to do a trial of fenofibrate discontinuation for 2 weeks to see if that changes her itching. If it does she will contact this office we will leave her off that medication and order lipid panel for 3 months from now. If it does not change itching she will restart the medication. Can be seeing dermatology as well. Patient is going to be attending a meeting for her travel that requires malaria prophylaxis she will call back to the office with the protocol that her group is taking and we will get that ordered. Trip is in November. Personalized Preventive Plan for Bibi Morgan - 8/31/2022  Medicare offers a range of preventive health benefits. Some of the tests and screenings are paid in full while other may be subject to a deductible, co-insurance, and/or copay. Some of these benefits include a comprehensive review of your medical history including lifestyle, illnesses that may run in your family, and various assessments and screenings as appropriate. After reviewing your medical record and screening and assessments performed today your provider may have ordered immunizations, labs, imaging, and/or referrals for you. A list of these orders (if applicable) as well as your Preventive Care list are included within your After Visit Summary for your review. Other Preventive Recommendations:    A preventive eye exam performed by an eye specialist is recommended every 1-2 years to screen for glaucoma; cataracts, macular degeneration, and other eye disorders. A preventive dental visit is recommended every 6 months. Try to get at least 150 minutes of exercise per week or 10,000 steps per day on a pedometer .   Order or download the FREE \"Exercise & Physical Activity: Your Everyday Guide\" from The [facility] on Aging. Call 0-939.397.9858 or search The [employer] on Aging online. You need 7607-7882 mg of calcium and 1956-7279 IU of vitamin D per day. It is possible to meet your calcium requirement with diet alone, but a vitamin D supplement is usually necessary to meet this goal.  When exposed to the sun, use a sunscreen that protects against both UVA and UVB radiation with an SPF of 30 or greater. Reapply every 2 to 3 hours or after sweating, drying off with a towel, or swimming. Always wear a seat belt when traveling in a car. Always wear a helmet when riding a bicycle or motorcycle.

## 2022-09-21 ENCOUNTER — HOSPITAL ENCOUNTER (OUTPATIENT)
Dept: WOMENS IMAGING | Age: 77
Discharge: HOME | End: 2022-09-23
Payer: MEDICARE

## 2022-09-21 DIAGNOSIS — Z12.31 BREAST CANCER SCREENING BY MAMMOGRAM: ICD-10-CM

## 2022-09-21 PROCEDURE — 77063 BREAST TOMOSYNTHESIS BI: CPT

## 2022-10-07 ENCOUNTER — TELEPHONE (OUTPATIENT)
Dept: FAMILY MEDICINE CLINIC | Age: 77
End: 2022-10-07

## 2022-10-07 NOTE — TELEPHONE ENCOUNTER
Pt said she has been off the fenofibrate and the itch did go away but does not know if she is to go back on it or if she needs something else? Pt is also asking for Malaria pills for trip. Said she previously discussed with provider.      [phone]

## 2022-10-10 DIAGNOSIS — Z29.8 NEED FOR MALARIA PROPHYLAXIS: Primary | ICD-10-CM

## 2022-10-10 DIAGNOSIS — E78.2 MIXED HYPERLIPIDEMIA: ICD-10-CM

## 2022-10-10 RX ORDER — ATOVAQUONE AND PROGUANIL HYDROCHLORIDE 250; 100 MG/1; MG/1
1 TABLET, FILM COATED ORAL DAILY
Qty: 16 TABLET | Refills: 0 | Status: SHIPPED | OUTPATIENT
Start: 2022-10-10 | End: 2022-10-26

## 2022-10-10 NOTE — TELEPHONE ENCOUNTER
Stay off fenofibrate. Repeat lipid panel in 1 to 2 months. We will determine medication choices in the future. Looking to order her malaria medication. Needs to start 1 to 2 days before the trip and continue for 7 days after. How long as the trip?

## 2022-10-10 NOTE — TELEPHONE ENCOUNTER
In [facility] 7 days total.  Leaving on  NOV 5.  GEV    Pt aware to complete labs in 1-2 months .

## 2023-02-20 DIAGNOSIS — E78.2 MIXED HYPERLIPIDEMIA: ICD-10-CM

## 2023-02-20 LAB
CHOLESTEROL, TOTAL: 188 MG/DL (ref 0–199)
HDLC SERPL-MCNC: 40 MG/DL (ref 40–59)
LDL CHOLESTEROL CALCULATED: 113 MG/DL (ref 0–129)
TRIGL SERPL-MCNC: 174 MG/DL (ref 0–150)

## 2023-02-28 ENCOUNTER — OFFICE VISIT (OUTPATIENT)
Dept: FAMILY MEDICINE CLINIC | Age: 78
End: 2023-02-28
Payer: MEDICARE

## 2023-02-28 VITALS
HEART RATE: 52 BPM | HEIGHT: 61 IN | BODY MASS INDEX: 25.83 KG/M2 | DIASTOLIC BLOOD PRESSURE: 62 MMHG | SYSTOLIC BLOOD PRESSURE: 114 MMHG | TEMPERATURE: 98.9 F | OXYGEN SATURATION: 98 % | WEIGHT: 136.8 LBS

## 2023-02-28 DIAGNOSIS — L56.5 DSAP (DISSEMINATED SUPERFICIAL ACTINIC POROKERATOSIS): ICD-10-CM

## 2023-02-28 DIAGNOSIS — I10 ESSENTIAL HYPERTENSION: ICD-10-CM

## 2023-02-28 DIAGNOSIS — E03.9 HYPOTHYROIDISM, UNSPECIFIED TYPE: ICD-10-CM

## 2023-02-28 DIAGNOSIS — E78.2 MIXED HYPERLIPIDEMIA: Primary | ICD-10-CM

## 2023-02-28 DIAGNOSIS — Q82.8 POROKERATOSIS: ICD-10-CM

## 2023-02-28 PROCEDURE — 1123F ACP DISCUSS/DSCN MKR DOCD: CPT | Performed by: FAMILY MEDICINE

## 2023-02-28 PROCEDURE — 3078F DIAST BP <80 MM HG: CPT | Performed by: FAMILY MEDICINE

## 2023-02-28 PROCEDURE — 3074F SYST BP LT 130 MM HG: CPT | Performed by: FAMILY MEDICINE

## 2023-02-28 PROCEDURE — 99214 OFFICE O/P EST MOD 30 MIN: CPT | Performed by: FAMILY MEDICINE

## 2023-02-28 RX ORDER — CARVEDILOL 6.25 MG/1
TABLET ORAL
Qty: 180 TABLET | Refills: 3 | Status: SHIPPED | OUTPATIENT
Start: 2023-02-28

## 2023-02-28 RX ORDER — LEVOTHYROXINE SODIUM 0.05 MG/1
50 TABLET ORAL DAILY
Qty: 90 TABLET | Refills: 3 | Status: SHIPPED | OUTPATIENT
Start: 2023-02-28

## 2023-02-28 SDOH — ECONOMIC STABILITY: FOOD INSECURITY: WITHIN THE PAST 12 MONTHS, THE FOOD YOU BOUGHT JUST DIDN'T LAST AND YOU DIDN'T HAVE MONEY TO GET MORE.: NEVER TRUE

## 2023-02-28 SDOH — ECONOMIC STABILITY: INCOME INSECURITY: HOW HARD IS IT FOR YOU TO PAY FOR THE VERY BASICS LIKE FOOD, HOUSING, MEDICAL CARE, AND HEATING?: NOT HARD AT ALL

## 2023-02-28 SDOH — ECONOMIC STABILITY: FOOD INSECURITY: WITHIN THE PAST 12 MONTHS, YOU WORRIED THAT YOUR FOOD WOULD RUN OUT BEFORE YOU GOT MONEY TO BUY MORE.: NEVER TRUE

## 2023-02-28 SDOH — ECONOMIC STABILITY: HOUSING INSECURITY
IN THE LAST 12 MONTHS, WAS THERE A TIME WHEN YOU DID NOT HAVE A STEADY PLACE TO SLEEP OR SLEPT IN A SHELTER (INCLUDING NOW)?: NO

## 2023-02-28 ASSESSMENT — ENCOUNTER SYMPTOMS
ABDOMINAL DISTENTION: 0
CHEST TIGHTNESS: 0
SHORTNESS OF BREATH: 0
PHOTOPHOBIA: 0
ABDOMINAL PAIN: 0

## 2023-02-28 ASSESSMENT — PATIENT HEALTH QUESTIONNAIRE - PHQ9
2. FEELING DOWN, DEPRESSED OR HOPELESS: 0
SUM OF ALL RESPONSES TO PHQ QUESTIONS 1-9: 0
1. LITTLE INTEREST OR PLEASURE IN DOING THINGS: 0
SUM OF ALL RESPONSES TO PHQ9 QUESTIONS 1 & 2: 0
SUM OF ALL RESPONSES TO PHQ QUESTIONS 1-9: 0

## 2023-02-28 NOTE — PATIENT INSTRUCTIONS
Patient's triglycerides are up and she is going to work on a low triglyceride diet repeat labs in 3 months. If lab remains elevated, due to her allergy to fenofibrate, we will consider cholestyramine once a day to help reduce triglycerides.     No other changes in medication at this time as all other medical conditions are stable    Referral created for long-term skin condition that remains uncontrolled but stable

## 2023-02-28 NOTE — PROGRESS NOTES
Diagnosis Orders   1. Mixed hyperlipidemia  Lipid Panel      2. Hypothyroidism, unspecified type  levothyroxine (SYNTHROID) 50 MCG tablet      3. Essential hypertension  carvedilol (COREG) 6.25 MG tablet      4. Porokeratosis  Puja Murguia MD, Dermatology, [place]      5. DSAP (disseminated superficial actinic porokeratosis)  Puja Murguia MD, Dermatology, [place]        Return in about 6 months (around 9/4/2023) for MAW exam due.  Patient Instructions   Patient's triglycerides are up and she is going to work on a low triglyceride diet repeat labs in 3 months.    If lab remains elevated, due to her allergy to fenofibrate, we will consider cholestyramine once a day to help reduce triglycerides.    No other changes in medication at this time as all other medical conditions are stable    Referral created for long-term skin condition that remains uncontrolled but stable    Subjective:      Patient ID: Sarah Vail is a 77 y.o. female who presents for:  Chief Complaint   Patient presents with    Hypertension     6 month check up   Address hcc's     Results     Discuss labs        Over a year ago pt went on a healthier diet to lose weight and does well.  However, she has a high intake again of breads and crackers, etc    She did have a stop the fenofibrate due to itch.  Within a few days of stopping it the itch improved and since she has been off that she has had no recurrence of episodes.    Discussed her recent lipid results.    He is requesting referral to dermatology for her long-term skin conditions.  sees them yearly      Current Outpatient Medications on File Prior to Visit   Medication Sig Dispense Refill    estradiol (ESTRACE VAGINAL) 0.1 MG/GM vaginal cream Place 1 g vaginally daily 42.5 g 1    acyclovir (ZOVIRAX) 5 % ointment Apply 6 times per day for 7 day 30 g 0    Multiple Vitamins-Minerals (MULTIPLE VITAMINS/WOMENS PO) Take  by mouth daily.         No current facility-administered medications on  file prior to visit.      Past Medical History:   Diagnosis Date    Abnormal EKG     BPPV (benign paroxysmal positional vertigo)     Colon polyps 2016    needs f/u in 5 years    Elevated liver enzymes 2013    Family history of heart disease     Female bladder prolapse     Heart murmur     History of bone density study 11    History of mammography, diagnostic 11    Category 3 Left mammogram/ Category 2 Right mammogram    Hyperlipidemia     on meds > 10 yrs    Hypertension     on meds > 10 yrs    Hypothyroidism 2021    Internal hemorrhoids     Normal cardiac stress test 2015    Osteoarthritis, multiple sites     shoulders     Osteopenia 2015    -1.4; -1.5 2018    Recurrent cold sores     Rheumatic fever     Trigger thumb of left hand      Past Surgical History:   Procedure Laterality Date    COLONOSCOPY  2006    COLONOSCOPY  2016    EN MYERS MD    COLONOSCOPY N/A 2022    COLONOSCOPY DIAGNOSTIC performed by Alex Manzano MD at [address] 2019    [address] BSO performed by Vlad Hannah MD at [facility] N/A 2019    USLS POSSIBLE SSLS performed by Vlad Hannah MD at [facility] 2017    LEFT HAND  FINGER TRIGGER RELEASE THUMB, SUPINE  performed by Giovana Maynard MD at [address] N/A 2019    A-P REPAIR performed by Vlad Hannah MD at [address] History     Socioeconomic History    Marital status: Single     Spouse name: Not on file    Number of children: 2    Years of education: Not on file    Highest education level: Not on file   Occupational History    Occupation: retired   Tobacco Use    Smoking status: Former     Packs/day: 0.50     Years: 20.00     Pack years: 10.00     Types: Cigarettes     Quit date: 2004     Years since quittin.5    Smokeless tobacco: Never    Tobacco comments:     quit in her 42's   Vaping Use    Vaping Use: Never used   Substance and Sexual Activity    Alcohol use: Yes     Alcohol/week: 0.0 standard drinks     Comment: wine 2 glasses per week    Drug use: No    Sexual activity: Not on file   Other Topics Concern    Not on file   Social History Narrative    Has children that live in [facility] Determinants of Health     Financial Resource Strain: Low Risk     Difficulty of Paying Living Expenses: Not hard at all   Food Insecurity: No Food Insecurity    Worried About [address] in the Last Year: Never true    [address] in the Last Year: Never true   Transportation Needs: Unknown    Lack of Transportation (Medical): Not on file    Lack of Transportation (Non-Medical): No   Physical Activity: Sufficiently Active    Days of Exercise per Week: 5 days    Minutes of Exercise per Session: 60 min   Stress: Not on file   Social Connections: Not on file   Intimate Partner Violence: Not on file   Housing Stability: Unknown    Unable to Pay for Housing in the Last Year: Not on file    Number of Places Lived in the Last Year: Not on file    Unstable Housing in the Last Year: No     Family History   Problem Relation Age of Onset    Asthma Father     Diabetes Father     Heart Disease Mother     Diabetes Brother     Heart Disease Brother         heart surgery    Kidney Disease Brother     Other Brother         psoriasis    Other Daughter         shoulder surgery, gum surgeries    Asthma Daughter         multiple allergies    Arthritis Sister     High Cholesterol Sister     Colon Cancer Maternal Grandfather      Allergies:  Fenofibrate    Review of Systems   Constitutional:  Negative for activity change, appetite change, diaphoresis and unexpected weight change. Eyes:  Negative for photophobia and visual disturbance. Respiratory:  Negative for chest tightness and shortness of breath. No orthopnea   Cardiovascular:  Negative for chest pain, palpitations and leg swelling.    Gastrointestinal:  Negative for abdominal distention and abdominal pain. Genitourinary:  Negative for flank pain and frequency. Musculoskeletal:  Negative for gait problem and joint swelling. Neurological:  Negative for dizziness, weakness, light-headedness and headaches. Psychiatric/Behavioral:  Negative for confusion. Objective:   /62   Pulse 52   Temp 98.9 °F (37.2 °C)   Ht 5' 1\" (1.549 m)   Wt 136 lb 12.8 oz (62.1 kg)   SpO2 98%   BMI 25.85 kg/m²     Physical Exam  Constitutional:       General: She is not in acute distress. Appearance: She is well-developed. She is not diaphoretic. HENT:      Head: Normocephalic and atraumatic. Right Ear: External ear normal.      Left Ear: External ear normal.      Nose: Nose normal.   Eyes:      General:         Right eye: No discharge. Left eye: No discharge. Conjunctiva/sclera: Conjunctivae normal.      Pupils: Pupils are equal, round, and reactive to light. Neck:      Thyroid: No thyromegaly. Trachea: No tracheal deviation. Cardiovascular:      Rate and Rhythm: Normal rate and regular rhythm. Heart sounds: Normal heart sounds. Pulmonary:      Effort: Pulmonary effort is normal. No respiratory distress. Breath sounds: Normal breath sounds. Abdominal:      General: There is no distension. Musculoskeletal:      Cervical back: Neck supple. Skin:     General: Skin is warm and dry. Findings: No bruising or rash. Neurological:      Mental Status: She is alert. Coordination: Coordination normal.   Psychiatric:         Thought Content: Thought content normal.         Judgment: Judgment normal.       No results found for this visit on 02/28/23.     Recent Results (from the past 2016 hour(s))   Lipid Panel    Collection Time: 02/20/23  8:52 AM   Result Value Ref Range    Cholesterol, Total 188 0 - 199 mg/dL    Triglycerides 174 (H) 0 - 150 mg/dL    HDL 40 40 - 59 mg/dL    LDL Calculated 113 0 - 129 mg/dL       [] Pt was seen by provider for      Minutes  Counseling and coordination of care was done for all assessment diagnosis listed for today with patient and any family/friend present. More than 50% of this visit was spent coordinating current care, obtaining information for prior records, and counseling for current plan of action. Assessment:       Diagnosis Orders   1. Mixed hyperlipidemia  Lipid Panel      2. Hypothyroidism, unspecified type  levothyroxine (SYNTHROID) 50 MCG tablet      3. Essential hypertension  carvedilol (COREG) 6.25 MG tablet      4. Porokeratosis  KIRTI Maxwell MD, Dermatology, [place]      5. DSAP (disseminated superficial actinic porokeratosis)  KIRTI Maxwell MD, Dermatology, [place]            Orders Placed This Encounter   Procedures    Lipid Panel     Standing Status:   Future     Standing Expiration Date:   2/28/2024    KIRTI Maxwell MD, Dermatology, [facility]     Referral Priority:   Routine     Referral Type:   Eval and Treat     Referral Reason:   Specialty Services Required     Referred to Provider:   Sona Villalobos DO     Requested Specialty:   Dermatology     Number of Visits Requested:   1         Orders Placed This Encounter   Medications    levothyroxine (SYNTHROID) 50 MCG tablet     Sig: Take 1 tablet by mouth Daily     Dispense:  90 tablet     Refill:  3    carvedilol (COREG) 6.25 MG tablet     Sig: TAKE 1 TABLET TWICE A DAY     Dispense:  180 tablet     Refill:  3            Medication List            Accurate as of February 28, 2023 12:13 PM. If you have any questions, ask your nurse or doctor.                 CONTINUE taking these medications      acyclovir 5 % ointment  Commonly known as: ZOVIRAX  Apply 6 times per day for 7 day     carvedilol 6.25 MG tablet  Commonly known as: COREG  TAKE 1 TABLET TWICE A DAY     estradiol 0.1 MG/GM vaginal cream  Commonly known as: ESTRACE VAGINAL  Place 1 g vaginally daily     levothyroxine 50 MCG tablet  Commonly known as: SYNTHROID  Take 1 tablet by mouth Daily     MULTIPLE VITAMINS/WOMENS PO            STOP taking these medications      fenofibrate 160 MG tablet  Commonly known as: TRIGLIDE  Stopped by: Brandee Roger MD     sodium-potassium-mag sulfate 17.5-3.13-1.6 GM/177ML Soln solution  Commonly known as: SUPREP  Stopped by: Brandee Roger MD               Where to Get Your Medications        These medications were sent to [facility] [address], [address] [place] [address] [phone] - F [phone]  [place] [address]      Phone: [phone]   carvedilol 6.25 MG tablet  levothyroxine 50 MCG tablet           Plan:   Return in about 6 months (around 9/4/2023) for MAW exam due. Patient Instructions   Patient's triglycerides are up and she is going to work on a low triglyceride diet repeat labs in 3 months. If lab remains elevated, due to her allergy to fenofibrate, we will consider cholestyramine once a day to help reduce triglycerides. No other changes in medication at this time as all other medical conditions are stable    Referral created for long-term skin condition that remains uncontrolled but stable    This note was partially created with the assistance of dictation. This may lead to grammatical or spelling errors. Garett Shook M.D.

## 2023-08-31 DIAGNOSIS — E78.2 MIXED HYPERLIPIDEMIA: ICD-10-CM

## 2023-08-31 LAB
CHOLEST SERPL-MCNC: 189 MG/DL (ref 0–199)
HDLC SERPL-MCNC: 37 MG/DL (ref 40–59)
LDLC SERPL CALC-MCNC: 109 MG/DL (ref 0–129)
TRIGL SERPL-MCNC: 216 MG/DL (ref 0–150)

## 2023-09-05 ENCOUNTER — OFFICE VISIT (OUTPATIENT)
Dept: FAMILY MEDICINE CLINIC | Age: 78
End: 2023-09-05
Payer: MEDICARE

## 2023-09-05 VITALS
TEMPERATURE: 98.6 F | HEIGHT: 61 IN | DIASTOLIC BLOOD PRESSURE: 72 MMHG | HEART RATE: 55 BPM | BODY MASS INDEX: 26.73 KG/M2 | OXYGEN SATURATION: 97 % | WEIGHT: 141.6 LBS | SYSTOLIC BLOOD PRESSURE: 128 MMHG

## 2023-09-05 DIAGNOSIS — Z00.00 MEDICARE ANNUAL WELLNESS VISIT, SUBSEQUENT: Primary | ICD-10-CM

## 2023-09-05 DIAGNOSIS — E78.2 MIXED HYPERLIPIDEMIA: ICD-10-CM

## 2023-09-05 PROCEDURE — 1123F ACP DISCUSS/DSCN MKR DOCD: CPT | Performed by: FAMILY MEDICINE

## 2023-09-05 PROCEDURE — 3078F DIAST BP <80 MM HG: CPT | Performed by: FAMILY MEDICINE

## 2023-09-05 PROCEDURE — G0439 PPPS, SUBSEQ VISIT: HCPCS | Performed by: FAMILY MEDICINE

## 2023-09-05 PROCEDURE — 3074F SYST BP LT 130 MM HG: CPT | Performed by: FAMILY MEDICINE

## 2023-09-05 RX ORDER — CHOLESTYRAMINE 4 G/9G
1 POWDER, FOR SUSPENSION ORAL DAILY
Qty: 30 PACKET | Refills: 2 | Status: SHIPPED | OUTPATIENT
Start: 2023-09-05

## 2023-09-05 ASSESSMENT — PATIENT HEALTH QUESTIONNAIRE - PHQ9
SUM OF ALL RESPONSES TO PHQ QUESTIONS 1-9: 0
2. FEELING DOWN, DEPRESSED OR HOPELESS: 0
SUM OF ALL RESPONSES TO PHQ QUESTIONS 1-9: 0
1. LITTLE INTEREST OR PLEASURE IN DOING THINGS: 0
SUM OF ALL RESPONSES TO PHQ9 QUESTIONS 1 & 2: 0
SUM OF ALL RESPONSES TO PHQ QUESTIONS 1-9: 0
SUM OF ALL RESPONSES TO PHQ QUESTIONS 1-9: 0

## 2023-09-05 ASSESSMENT — LIFESTYLE VARIABLES
HOW MANY STANDARD DRINKS CONTAINING ALCOHOL DO YOU HAVE ON A TYPICAL DAY: 1 OR 2
HOW OFTEN DO YOU HAVE A DRINK CONTAINING ALCOHOL: MONTHLY OR LESS

## 2023-09-05 NOTE — PROGRESS NOTES
CareTeam (Including outside providers/suppliers regularly involved in providing care):   Patient Care Team:  Arvin Quiroz MD as PCP - General (Family Medicine)  Arvin Quiroz MD as PCP - Empaneled Provider  Alicia Patel MD as Consulting Physician (Internal Medicine)     Reviewed and updated this visit:  Tobacco  Allergies  Meds  Problems  Med Hx  Surg Hx  Soc Hx  Fam Hx

## 2023-09-05 NOTE — PATIENT INSTRUCTIONS
specialist is recommended every 1-2 years to screen for glaucoma; cataracts, macular degeneration, and other eye disorders. A preventive dental visit is recommended every 6 months. Try to get at least 150 minutes of exercise per week or 10,000 steps per day on a pedometer . Order or download the FREE \"Exercise & Physical Activity: Your Everyday Guide\" from The [employer] on Aging. Call 1-160.425.6674 or search The [employer] on Aging online. You need 8950-3057 mg of calcium and 3629-0273 IU of vitamin D per day. It is possible to meet your calcium requirement with diet alone, but a vitamin D supplement is usually necessary to meet this goal.  When exposed to the sun, use a sunscreen that protects against both UVA and UVB radiation with an SPF of 30 or greater. Reapply every 2 to 3 hours or after sweating, drying off with a towel, or swimming. Always wear a seat belt when traveling in a car. Always wear a helmet when riding a bicycle or motorcycle. ambulatory

## 2023-10-09 ENCOUNTER — HOSPITAL ENCOUNTER (OUTPATIENT)
Dept: WOMENS IMAGING | Age: 78
Discharge: HOME | End: 2023-10-11
Payer: MEDICARE

## 2023-10-09 ENCOUNTER — TRANSCRIBE ORDERS (OUTPATIENT)
Dept: ULTRASOUND IMAGING | Age: 78
End: 2023-10-09

## 2023-10-09 DIAGNOSIS — Z12.31 SCREENING MAMMOGRAM FOR BREAST CANCER: ICD-10-CM

## 2023-10-09 DIAGNOSIS — Z12.31 SCREENING MAMMOGRAM FOR BREAST CANCER: Primary | ICD-10-CM

## 2023-10-09 PROCEDURE — 77063 BREAST TOMOSYNTHESIS BI: CPT

## 2023-10-19 ENCOUNTER — OFFICE VISIT (OUTPATIENT)
Dept: FAMILY MEDICINE CLINIC | Age: 78
End: 2023-10-19
Payer: MEDICARE

## 2023-10-19 VITALS
HEIGHT: 61 IN | OXYGEN SATURATION: 98 % | TEMPERATURE: 98 F | WEIGHT: 146.8 LBS | DIASTOLIC BLOOD PRESSURE: 70 MMHG | SYSTOLIC BLOOD PRESSURE: 122 MMHG | BODY MASS INDEX: 27.72 KG/M2 | HEART RATE: 60 BPM

## 2023-10-19 DIAGNOSIS — N39.0 URINARY TRACT INFECTION WITH HEMATURIA, SITE UNSPECIFIED: Primary | ICD-10-CM

## 2023-10-19 DIAGNOSIS — R31.9 URINARY TRACT INFECTION WITH HEMATURIA, SITE UNSPECIFIED: Primary | ICD-10-CM

## 2023-10-19 DIAGNOSIS — R39.9 UTI SYMPTOMS: ICD-10-CM

## 2023-10-19 LAB
BILIRUBIN, POC: NORMAL
BLOOD URINE, POC: NORMAL
CLARITY, POC: NORMAL
COLOR, POC: YELLOW
GLUCOSE URINE, POC: NORMAL
KETONES, POC: NORMAL
LEUKOCYTE EST, POC: NORMAL
NITRITE, POC: NORMAL
PH, POC: 6
PROTEIN, POC: NORMAL
SPECIFIC GRAVITY, POC: 1.02
UROBILINOGEN, POC: NORMAL

## 2023-10-19 PROCEDURE — 81003 URINALYSIS AUTO W/O SCOPE: CPT | Performed by: NURSE PRACTITIONER

## 2023-10-19 PROCEDURE — 99213 OFFICE O/P EST LOW 20 MIN: CPT | Performed by: NURSE PRACTITIONER

## 2023-10-19 PROCEDURE — 3074F SYST BP LT 130 MM HG: CPT | Performed by: NURSE PRACTITIONER

## 2023-10-19 PROCEDURE — 1123F ACP DISCUSS/DSCN MKR DOCD: CPT | Performed by: NURSE PRACTITIONER

## 2023-10-19 PROCEDURE — 3078F DIAST BP <80 MM HG: CPT | Performed by: NURSE PRACTITIONER

## 2023-10-19 ASSESSMENT — ENCOUNTER SYMPTOMS
SHORTNESS OF BREATH: 0
BACK PAIN: 0
DIARRHEA: 0
VOMITING: 0
CHEST TIGHTNESS: 0
ABDOMINAL PAIN: 0
NAUSEA: 0
CONSTIPATION: 0

## 2023-10-19 NOTE — PROGRESS NOTES
Subjective  Daryl Atkinson, 66 y.o. female presents today with:  Chief Complaint   Patient presents with    Urinary Tract Infection     Onset- a week   Frequency- Y  Urgency- Y   Small volume void- N   Dysuria- N   Pressure- Y   Back pain- N  Nocturia- Y   Fever/ chills- N  Nausea/ vomiting- N   UTI or other reason for antbx's last 30 days- Cranberry juice        I reviewed staff HPI/chief complaint and do agree with above    Patient presents for concerns of urinary symptoms as noted above, states has been present about 1 week. States she does have a history of frequent UTIs, last treated in May 2023. Patient states he does have a history of severe external hemorrhoids that do require patient to wear a pad throughout the day that contributes to recurrent/frequent UTIs. She denies any fevers/chills, dysuria, nausea/vomiting/diarrhea with symptoms. Review of Systems   Constitutional:  Negative for appetite change, chills, fatigue and fever. Respiratory:  Negative for chest tightness and shortness of breath. Cardiovascular:  Negative for chest pain and palpitations. Gastrointestinal:  Negative for abdominal pain, constipation, diarrhea, nausea and vomiting. Genitourinary:  Positive for frequency and urgency. Negative for dysuria, pelvic pain, vaginal discharge and vaginal pain. Musculoskeletal:  Negative for back pain and myalgias. Skin:  Negative for rash. Neurological:  Negative for dizziness, weakness, light-headedness, numbness and headaches.        Past Medical History:   Diagnosis Date    Abnormal EKG     BPPV (benign paroxysmal positional vertigo)     Colon polyps 09/2016    needs f/u in 5 years    Elevated liver enzymes 11/5/2013    Family history of heart disease     Female bladder prolapse     Heart murmur     History of bone density study 4/7/11    History of mammography, diagnostic 4/13/11    Category 3 Left mammogram/ Category 2 Right mammogram    Hyperlipidemia     on meds > 10

## 2023-10-20 RX ORDER — NITROFURANTOIN 25; 75 MG/1; MG/1
100 CAPSULE ORAL 2 TIMES DAILY
Qty: 10 CAPSULE | Refills: 0 | Status: SHIPPED | OUTPATIENT
Start: 2023-10-20 | End: 2023-10-25

## 2024-02-26 DIAGNOSIS — E03.9 HYPOTHYROIDISM, UNSPECIFIED TYPE: Primary | ICD-10-CM

## 2024-02-26 DIAGNOSIS — E03.9 HYPOTHYROIDISM, UNSPECIFIED TYPE: ICD-10-CM

## 2024-02-26 DIAGNOSIS — E78.2 MIXED HYPERLIPIDEMIA: ICD-10-CM

## 2024-02-26 DIAGNOSIS — I10 ESSENTIAL HYPERTENSION: ICD-10-CM

## 2024-02-26 LAB
CHOLEST SERPL-MCNC: 183 MG/DL (ref 0–199)
HDLC SERPL-MCNC: 36 MG/DL (ref 40–59)
LDLC SERPL CALC-MCNC: 98 MG/DL (ref 0–129)
TRIGL SERPL-MCNC: 245 MG/DL (ref 0–150)

## 2024-02-26 RX ORDER — LEVOTHYROXINE SODIUM 0.05 MG/1
50 TABLET ORAL DAILY
Qty: 90 TABLET | Refills: 3 | Status: SHIPPED | OUTPATIENT
Start: 2024-02-26

## 2024-02-26 RX ORDER — CARVEDILOL 6.25 MG/1
TABLET ORAL
Qty: 180 TABLET | Refills: 3 | Status: SHIPPED | OUTPATIENT
Start: 2024-02-26

## 2024-02-26 NOTE — TELEPHONE ENCOUNTER
Future Appointments    Encounter Information   Provider Department Appt Notes   3/26/2024 Garett Arambula MD [facility] Primary [facility] Appt Reason: Routine Existing Condition Follow Up  Follow up (r/s from 3/8 per provider)  Booking Code: [id number]     Past Visits    Date Provider Specialty Visit Type Primary Dx   10/19/2023 Darwin Vo, LIZ - CNP Family Medicine Office Visit Urinary tract infection with hematuria, site unspecified

## 2024-02-26 NOTE — TELEPHONE ENCOUNTER
Refill sent to pharmacy.  Keep appointment as scheduled.  It does look like she is due to have her thyroid lab rechecked.  Lab has been ordered, it looks like Dr. Arambula also ordered a cholesterol panel for her.  Labs need to be fasting for at least 8 hours.  Thank you

## 2024-03-13 DIAGNOSIS — I10 ESSENTIAL HYPERTENSION: Primary | ICD-10-CM

## 2024-03-13 DIAGNOSIS — E78.2 MIXED HYPERLIPIDEMIA: ICD-10-CM

## 2024-03-13 RX ORDER — ROSUVASTATIN CALCIUM 5 MG/1
5 TABLET, COATED ORAL NIGHTLY
Qty: 30 TABLET | Refills: 3 | Status: SHIPPED | OUTPATIENT
Start: 2024-03-13

## 2024-03-14 DIAGNOSIS — E78.89 LIPIDS ABNORMAL: Primary | ICD-10-CM

## 2024-03-14 DIAGNOSIS — E03.9 HYPOTHYROIDISM, UNSPECIFIED TYPE: ICD-10-CM

## 2024-03-14 LAB — TSH REFLEX: 2.62 UIU/ML (ref 0.44–3.86)

## 2024-03-26 ENCOUNTER — OFFICE VISIT (OUTPATIENT)
Dept: FAMILY MEDICINE CLINIC | Age: 79
End: 2024-03-26
Payer: MEDICARE

## 2024-03-26 VITALS
HEART RATE: 55 BPM | OXYGEN SATURATION: 97 % | WEIGHT: 148 LBS | TEMPERATURE: 98.4 F | SYSTOLIC BLOOD PRESSURE: 124 MMHG | DIASTOLIC BLOOD PRESSURE: 68 MMHG | HEIGHT: 61 IN | BODY MASS INDEX: 27.94 KG/M2

## 2024-03-26 DIAGNOSIS — Z78.0 POSTMENOPAUSE: ICD-10-CM

## 2024-03-26 DIAGNOSIS — I10 PRIMARY HYPERTENSION: Primary | ICD-10-CM

## 2024-03-26 DIAGNOSIS — E03.9 HYPOTHYROIDISM, UNSPECIFIED TYPE: ICD-10-CM

## 2024-03-26 DIAGNOSIS — R10.9 ABDOMINAL DISCOMFORT: ICD-10-CM

## 2024-03-26 PROBLEM — N81.6 POP-Q STAGE 2 RECTOCELE: Status: RESOLVED | Noted: 2018-10-30 | Resolved: 2024-03-26

## 2024-03-26 PROBLEM — N81.2 INCOMPLETE UTERINE PROLAPSE: Status: RESOLVED | Noted: 2018-10-30 | Resolved: 2024-03-26

## 2024-03-26 PROBLEM — H81.10 BPPV (BENIGN PAROXYSMAL POSITIONAL VERTIGO): Status: RESOLVED | Noted: 2021-01-09 | Resolved: 2024-03-26

## 2024-03-26 PROCEDURE — 3074F SYST BP LT 130 MM HG: CPT | Performed by: FAMILY MEDICINE

## 2024-03-26 PROCEDURE — 1123F ACP DISCUSS/DSCN MKR DOCD: CPT | Performed by: FAMILY MEDICINE

## 2024-03-26 PROCEDURE — 99214 OFFICE O/P EST MOD 30 MIN: CPT | Performed by: FAMILY MEDICINE

## 2024-03-26 PROCEDURE — 3078F DIAST BP <80 MM HG: CPT | Performed by: FAMILY MEDICINE

## 2024-03-26 SDOH — ECONOMIC STABILITY: FOOD INSECURITY: WITHIN THE PAST 12 MONTHS, YOU WORRIED THAT YOUR FOOD WOULD RUN OUT BEFORE YOU GOT MONEY TO BUY MORE.: NEVER TRUE

## 2024-03-26 SDOH — ECONOMIC STABILITY: INCOME INSECURITY: HOW HARD IS IT FOR YOU TO PAY FOR THE VERY BASICS LIKE FOOD, HOUSING, MEDICAL CARE, AND HEATING?: NOT HARD AT ALL

## 2024-03-26 SDOH — ECONOMIC STABILITY: FOOD INSECURITY: WITHIN THE PAST 12 MONTHS, THE FOOD YOU BOUGHT JUST DIDN'T LAST AND YOU DIDN'T HAVE MONEY TO GET MORE.: NEVER TRUE

## 2024-03-26 ASSESSMENT — ENCOUNTER SYMPTOMS
CONSTIPATION: 0
PHOTOPHOBIA: 0
ABDOMINAL PAIN: 1
SHORTNESS OF BREATH: 0
ABDOMINAL DISTENTION: 0
CHEST TIGHTNESS: 0
NAUSEA: 0
VOMITING: 0

## 2024-03-26 ASSESSMENT — PATIENT HEALTH QUESTIONNAIRE - PHQ9
1. LITTLE INTEREST OR PLEASURE IN DOING THINGS: NOT AT ALL
SUM OF ALL RESPONSES TO PHQ QUESTIONS 1-9: 0
SUM OF ALL RESPONSES TO PHQ QUESTIONS 1-9: 0
2. FEELING DOWN, DEPRESSED OR HOPELESS: NOT AT ALL
SUM OF ALL RESPONSES TO PHQ QUESTIONS 1-9: 0
SUM OF ALL RESPONSES TO PHQ QUESTIONS 1-9: 0
SUM OF ALL RESPONSES TO PHQ9 QUESTIONS 1 & 2: 0

## 2024-03-26 NOTE — PATIENT INSTRUCTIONS
Blood pressure well-controlled no changes.    Patient's thyroid well-controlled no changes.    Cholesterol medication initiated a little over a month ago and going well.  Recheck in May.    Continue to monitor symptoms of bowel.  Consider gluten-free diet trial for at least 2 weeks.    Wellness visit due in September

## 2024-03-26 NOTE — PROGRESS NOTES
Diagnosis Orders   1. Primary hypertension        2. Hypothyroidism, unspecified type        3. Postmenopause  DEXA BONE DENSITY AXIAL SKELETON      4. Abdominal discomfort          Return in about 5 months (around 9/9/2024) for MAW exam due.  Patient Instructions   Blood pressure well-controlled no changes.    Patient's thyroid well-controlled no changes.    Cholesterol medication initiated a little over a month ago and going well.  Recheck in May.    Continue to monitor symptoms of bowel.  Consider gluten-free diet trial for at least 2 weeks.    Wellness visit due in September    Subjective:      Patient ID: Sarah Vail is a 78 y.o. female who presents for:  Chief Complaint   Patient presents with    Hypertension     X 6 month hcc's   Discuss Crestor     Health Maintenance     9/6/2024 AWV due        Pt tolerating crestor well.    Still noting pain in the epigastrum in a way that feels like something is trying to go through her gut and it wont go. No bloating.    Noting more LLQ discomfort that lasts minutes and goes away on its own.  No fever, chills, nausea, vomit. Has recent colonoscopy that was negative. Pt did stop eating yogurt and then added back-did not make a difference. Has not tried to eliminate gluten. Actually found crestor to be helpful with rushing bowel movements and decreasing the occasion of the things.         Current Outpatient Medications on File Prior to Visit   Medication Sig Dispense Refill    rosuvastatin (CRESTOR) 5 MG tablet Take 1 tablet by mouth nightly 30 tablet 3    carvedilol (COREG) 6.25 MG tablet TAKE 1 TABLET TWICE A  tablet 3    levothyroxine (SYNTHROID) 50 MCG tablet Take 1 tablet by mouth Daily 90 tablet 3    estradiol (ESTRACE VAGINAL) 0.1 MG/GM vaginal cream Place 1 g vaginally daily 42.5 g 1    acyclovir (ZOVIRAX) 5 % ointment Apply 6 times per day for 7 day 30 g 0    Multiple Vitamins-Minerals (MULTIPLE VITAMINS/WOMENS PO) Take  by mouth daily.         No current

## 2024-04-04 ENCOUNTER — HOSPITAL ENCOUNTER (OUTPATIENT)
Dept: WOMENS IMAGING | Age: 79
Discharge: HOME | End: 2024-04-06
Payer: MEDICARE

## 2024-04-04 DIAGNOSIS — Z78.0 POSTMENOPAUSE: ICD-10-CM

## 2024-04-04 PROCEDURE — 77080 DXA BONE DENSITY AXIAL: CPT

## 2024-05-23 DIAGNOSIS — E78.89 LIPIDS ABNORMAL: ICD-10-CM

## 2024-05-23 LAB
CHOLEST SERPL-MCNC: 122 MG/DL (ref 0–199)
HDLC SERPL-MCNC: 36 MG/DL (ref 40–59)
LDL CHOLESTEROL: 50 MG/DL (ref 0–129)
TRIGLYCERIDE, FASTING: 182 MG/DL (ref 0–150)

## 2024-05-23 NOTE — RESULT ENCOUNTER NOTE
Triglycerides are little bit high.  Be more mindful of dietary intake of cholesterol.  Repeat labs in 6 months to year

## 2024-05-28 DIAGNOSIS — I10 ESSENTIAL HYPERTENSION: ICD-10-CM

## 2024-05-28 DIAGNOSIS — E78.2 MIXED HYPERLIPIDEMIA: ICD-10-CM

## 2024-05-28 DIAGNOSIS — E03.9 HYPOTHYROIDISM, UNSPECIFIED TYPE: ICD-10-CM

## 2024-05-28 NOTE — RESULT ENCOUNTER NOTE
Yes continue.  Create order to repeat lipids in 3 months.  If the triglycerides remain elevated we may need to increase the dose of the rosuvastatin.  Notify the patient

## 2024-05-29 RX ORDER — ROSUVASTATIN CALCIUM 5 MG/1
5 TABLET, COATED ORAL NIGHTLY
Qty: 90 TABLET | Refills: 3 | Status: SHIPPED | OUTPATIENT
Start: 2024-05-29 | End: 2024-05-30 | Stop reason: SDUPTHER

## 2024-05-29 NOTE — TELEPHONE ENCOUNTER
Comments: Please review, pt asking for 90 day rx    Last Office Visit (last PCP visit):   3/26/2024    Next Visit Date:  Future Appointments   Date Time Provider Department Center   9/9/2024  9:30 AM Garett Arambula MD [facility]       **If hasn't been seen in over a year OR hasn't followed up according to last diabetes/ADHD visit, make appointment for patient before sending refill to provider.    Rx requested:  Requested Prescriptions     Pending Prescriptions Disp Refills    rosuvastatin (CRESTOR) 5 MG tablet 30 tablet 3     Sig: Take 1 tablet by mouth nightly

## 2024-05-30 DIAGNOSIS — E78.2 MIXED HYPERLIPIDEMIA: ICD-10-CM

## 2024-05-30 DIAGNOSIS — I10 ESSENTIAL HYPERTENSION: ICD-10-CM

## 2024-05-30 RX ORDER — ROSUVASTATIN CALCIUM 5 MG/1
5 TABLET, COATED ORAL NIGHTLY
Qty: 90 TABLET | Refills: 3 | Status: SHIPPED | OUTPATIENT
Start: 2024-05-30

## 2024-05-30 NOTE — TELEPHONE ENCOUNTER
Pt calling stating we sent the prescription to the wrong mail in it needs to go to [facility] please and thank

## 2024-08-06 DIAGNOSIS — E78.2 MIXED HYPERLIPIDEMIA: Primary | ICD-10-CM

## 2024-08-06 DIAGNOSIS — E78.2 MIXED HYPERLIPIDEMIA: ICD-10-CM

## 2024-08-06 LAB
CHOLEST SERPL-MCNC: 110 MG/DL (ref 0–199)
HDLC SERPL-MCNC: 33 MG/DL (ref 40–59)
LDLC SERPL CALC-MCNC: 44 MG/DL (ref 0–129)
TRIGL SERPL-MCNC: 165 MG/DL (ref 0–150)

## 2024-09-16 ENCOUNTER — OFFICE VISIT (OUTPATIENT)
Dept: FAMILY MEDICINE CLINIC | Age: 79
End: 2024-09-16
Payer: MEDICARE

## 2024-09-16 VITALS
OXYGEN SATURATION: 99 % | SYSTOLIC BLOOD PRESSURE: 104 MMHG | BODY MASS INDEX: 28.13 KG/M2 | HEIGHT: 61 IN | WEIGHT: 149 LBS | DIASTOLIC BLOOD PRESSURE: 60 MMHG | HEART RATE: 66 BPM

## 2024-09-16 DIAGNOSIS — Z00.00 MEDICARE ANNUAL WELLNESS VISIT, SUBSEQUENT: Primary | ICD-10-CM

## 2024-09-16 PROCEDURE — G0439 PPPS, SUBSEQ VISIT: HCPCS | Performed by: FAMILY MEDICINE

## 2024-09-16 PROCEDURE — 1123F ACP DISCUSS/DSCN MKR DOCD: CPT | Performed by: FAMILY MEDICINE

## 2024-09-16 PROCEDURE — 3078F DIAST BP <80 MM HG: CPT | Performed by: FAMILY MEDICINE

## 2024-09-16 PROCEDURE — 3074F SYST BP LT 130 MM HG: CPT | Performed by: FAMILY MEDICINE

## 2024-09-16 ASSESSMENT — PATIENT HEALTH QUESTIONNAIRE - PHQ9
2. FEELING DOWN, DEPRESSED OR HOPELESS: NOT AT ALL
SUM OF ALL RESPONSES TO PHQ9 QUESTIONS 1 & 2: 0
SUM OF ALL RESPONSES TO PHQ QUESTIONS 1-9: 0
SUM OF ALL RESPONSES TO PHQ QUESTIONS 1-9: 0
1. LITTLE INTEREST OR PLEASURE IN DOING THINGS: NOT AT ALL
SUM OF ALL RESPONSES TO PHQ QUESTIONS 1-9: 0
SUM OF ALL RESPONSES TO PHQ QUESTIONS 1-9: 0

## 2024-09-16 ASSESSMENT — LIFESTYLE VARIABLES
HOW OFTEN DO YOU HAVE A DRINK CONTAINING ALCOHOL: MONTHLY OR LESS
HOW MANY STANDARD DRINKS CONTAINING ALCOHOL DO YOU HAVE ON A TYPICAL DAY: 1 OR 2

## 2024-10-10 ENCOUNTER — HOSPITAL ENCOUNTER (OUTPATIENT)
Dept: WOMENS IMAGING | Age: 79
Discharge: HOME | End: 2024-10-12
Payer: MEDICARE

## 2024-10-10 ENCOUNTER — TRANSCRIBE ORDERS (OUTPATIENT)
Dept: WOMENS IMAGING | Age: 79
End: 2024-10-10

## 2024-10-10 DIAGNOSIS — Z12.31 SCREENING MAMMOGRAM FOR BREAST CANCER: ICD-10-CM

## 2024-10-10 DIAGNOSIS — Z12.31 SCREENING MAMMOGRAM FOR BREAST CANCER: Primary | ICD-10-CM

## 2024-10-10 PROCEDURE — 77067 SCR MAMMO BI INCL CAD: CPT

## 2025-01-15 DIAGNOSIS — E03.9 HYPOTHYROIDISM, UNSPECIFIED TYPE: ICD-10-CM

## 2025-01-15 DIAGNOSIS — I10 ESSENTIAL HYPERTENSION: ICD-10-CM

## 2025-01-15 RX ORDER — LEVOTHYROXINE SODIUM 50 UG/1
50 TABLET ORAL DAILY
Qty: 90 TABLET | Refills: 0 | Status: SHIPPED | OUTPATIENT
Start: 2025-01-15

## 2025-01-15 RX ORDER — CARVEDILOL 6.25 MG/1
TABLET ORAL
Qty: 180 TABLET | Refills: 3 | Status: SHIPPED | OUTPATIENT
Start: 2025-01-15 | End: 2025-01-16 | Stop reason: SDUPTHER

## 2025-01-15 NOTE — TELEPHONE ENCOUNTER
Comments:     Last Office Visit (last PCP visit):   9/16/2024    Next Visit Date:  Future Appointments   Date Time Provider Department Center   2/20/2025  9:45 AM Garett Arambula MD [facility] DEP       **If hasn't been seen in over a year OR hasn't followed up according to last diabetes/ADHD visit, make appointment for patient before sending refill to provider.    Rx requested:  Requested Prescriptions      No prescriptions requested or ordered in this encounter

## 2025-01-15 NOTE — TELEPHONE ENCOUNTER
Comments: is pt still taking , did lm to [facility]     Last Office Visit (last PCP visit):   9/16/2024    Next Visit Date:  Future Appointments   Date Time Provider Department Center   2/20/2025  9:45 AM Garett Arambula MD [facility] DEP       **If hasn't been seen in over a year OR hasn't followed up according to last diabetes/ADHD visit, make appointment for patient before sending refill to provider.    Rx requested:  Requested Prescriptions     Pending Prescriptions Disp Refills    carvedilol (COREG) 6.25 MG tablet 180 tablet 3     Sig: TAKE 1 TABLET TWICE A DAY

## 2025-01-16 ENCOUNTER — TELEPHONE (OUTPATIENT)
Dept: FAMILY MEDICINE CLINIC | Age: 80
End: 2025-01-16

## 2025-01-16 RX ORDER — CARVEDILOL 6.25 MG/1
TABLET ORAL
Qty: 180 TABLET | Refills: 3 | Status: SHIPPED | OUTPATIENT
Start: 2025-01-16

## 2025-01-16 NOTE — TELEPHONE ENCOUNTER
Pt called and stated that her scripts were sent to [facility] but should have went to [facility]

## 2025-01-16 NOTE — TELEPHONE ENCOUNTER
Pt called today stating that this medication was sent to the wrong pharmacy. Should be [facility], send to [facility].    Please advise!

## 2025-02-03 DIAGNOSIS — I15.9 SECONDARY HYPERTENSION: ICD-10-CM

## 2025-02-03 DIAGNOSIS — E78.2 MIXED HYPERLIPIDEMIA: ICD-10-CM

## 2025-02-03 DIAGNOSIS — E55.9 VITAMIN D DEFICIENCY: ICD-10-CM

## 2025-02-03 DIAGNOSIS — E03.9 HYPOTHYROIDISM, UNSPECIFIED TYPE: Primary | ICD-10-CM

## 2025-02-03 LAB
ALBUMIN SERPL-MCNC: 4.1 G/DL (ref 3.5–4.6)
ALP SERPL-CCNC: 49 U/L (ref 40–130)
ALT SERPL-CCNC: 23 U/L (ref 0–33)
ANION GAP SERPL CALCULATED.3IONS-SCNC: 12 MEQ/L (ref 9–15)
AST SERPL-CCNC: 29 U/L (ref 0–35)
BASOPHILS # BLD: 0 K/UL (ref 0–0.2)
BASOPHILS NFR BLD: 0.4 %
BILIRUB SERPL-MCNC: 0.7 MG/DL (ref 0.2–0.7)
BUN SERPL-MCNC: 11 MG/DL (ref 8–23)
CALCIUM SERPL-MCNC: 9.2 MG/DL (ref 8.5–9.9)
CHLORIDE SERPL-SCNC: 103 MEQ/L (ref 95–107)
CHOLEST SERPL-MCNC: 115 MG/DL (ref 0–199)
CO2 SERPL-SCNC: 25 MEQ/L (ref 20–31)
CREAT SERPL-MCNC: 0.65 MG/DL (ref 0.5–0.9)
EOSINOPHIL # BLD: 0.1 K/UL (ref 0–0.7)
EOSINOPHIL NFR BLD: 1.5 %
ERYTHROCYTE [DISTWIDTH] IN BLOOD BY AUTOMATED COUNT: 14.1 % (ref 11.5–14.5)
GLOBULIN SER CALC-MCNC: 2.6 G/DL (ref 2.3–3.5)
GLUCOSE SERPL-MCNC: 108 MG/DL (ref 70–99)
HCT VFR BLD AUTO: 48.8 % (ref 37–47)
HDLC SERPL-MCNC: 33 MG/DL (ref 40–59)
HGB BLD-MCNC: 16 G/DL (ref 12–16)
LDLC SERPL CALC-MCNC: 51 MG/DL (ref 0–129)
LYMPHOCYTES # BLD: 2.2 K/UL (ref 1–4.8)
LYMPHOCYTES NFR BLD: 25.1 %
MCH RBC QN AUTO: 30.2 PG (ref 27–31.3)
MCHC RBC AUTO-ENTMCNC: 32.8 % (ref 33–37)
MCV RBC AUTO: 92.1 FL (ref 79.4–94.8)
MONOCYTES # BLD: 0.7 K/UL (ref 0.2–0.8)
MONOCYTES NFR BLD: 7.6 %
NEUTROPHILS # BLD: 5.8 K/UL (ref 1.4–6.5)
NEUTS SEG NFR BLD: 65.1 %
PLATELET # BLD AUTO: 174 K/UL (ref 130–400)
POTASSIUM SERPL-SCNC: 4 MEQ/L (ref 3.4–4.9)
PROT SERPL-MCNC: 6.7 G/DL (ref 6.3–8)
RBC # BLD AUTO: 5.3 M/UL (ref 4.2–5.4)
SODIUM SERPL-SCNC: 140 MEQ/L (ref 135–144)
TRIGL SERPL-MCNC: 156 MG/DL (ref 0–150)
TSH REFLEX: 2.65 UIU/ML (ref 0.44–3.86)
WBC # BLD AUTO: 8.9 K/UL (ref 4.8–10.8)

## 2025-02-04 LAB — VITAMIN D 25-HYDROXY: 22.1 NG/ML (ref 30–100)

## 2025-02-05 DIAGNOSIS — E03.9 HYPOTHYROIDISM, UNSPECIFIED TYPE: ICD-10-CM

## 2025-02-05 RX ORDER — LEVOTHYROXINE SODIUM 50 UG/1
50 TABLET ORAL DAILY
Qty: 90 TABLET | Refills: 3 | Status: SHIPPED | OUTPATIENT
Start: 2025-02-05

## 2025-02-05 NOTE — RESULT ENCOUNTER NOTE
Notify patient overall labs are stable.  Vitamin D is low.  Needs to supplement with 5000 international units daily in the form of a gelcap and we will repeat labs in 3 months to check on this.  Order lipid panel and vitamin D for 3 months from now with diagnosis of hyperlipidemia mixed and vitamin D deficiency

## 2025-02-05 NOTE — TELEPHONE ENCOUNTER
Comments:     [facility] for [facility] Rx     Auto faxed 2/3/25    Last fill 1/15?    Last Office Visit (last PCP visit):   9/16/2024    Next Visit Date:  Future Appointments   Date Time Provider Department Center   2/20/2025  9:45 AM Garett Arambula MD [facility] DEP       **If hasn't been seen in over a year OR hasn't followed up according to last diabetes/ADHD visit, make appointment for patient before sending refill to provider.    Rx requested:  Requested Prescriptions     Pending Prescriptions Disp Refills    levothyroxine (SYNTHROID) 50 MCG tablet 90 tablet 0     Sig: Take 1 tablet by mouth Daily

## 2025-02-06 RX ORDER — LEVOTHYROXINE SODIUM 50 UG/1
50 TABLET ORAL DAILY
Qty: 90 TABLET | Refills: 3 | OUTPATIENT
Start: 2025-02-06

## 2025-02-06 NOTE — TELEPHONE ENCOUNTER
Prescription sent to the wrong pharmacy again..?    Pharmacy set to [facility]    Please fill ASAP, pt not very happy... :(

## 2025-02-06 NOTE — TELEPHONE ENCOUNTER
Pt called to let us know that her prescription 2x now have been sent to the wrong pharmacy.  [facility] requested for the refill and it was sent to [facility] pharmacy in [place]. [facility]

## 2025-02-07 DIAGNOSIS — E55.9 VITAMIN D DEFICIENCY: ICD-10-CM

## 2025-02-07 DIAGNOSIS — E78.2 MIXED HYPERLIPIDEMIA: Primary | ICD-10-CM

## 2025-02-19 SDOH — ECONOMIC STABILITY: FOOD INSECURITY: WITHIN THE PAST 12 MONTHS, THE FOOD YOU BOUGHT JUST DIDN'T LAST AND YOU DIDN'T HAVE MONEY TO GET MORE.: NEVER TRUE

## 2025-02-19 SDOH — ECONOMIC STABILITY: FOOD INSECURITY: WITHIN THE PAST 12 MONTHS, YOU WORRIED THAT YOUR FOOD WOULD RUN OUT BEFORE YOU GOT MONEY TO BUY MORE.: NEVER TRUE

## 2025-02-19 SDOH — ECONOMIC STABILITY: INCOME INSECURITY: IN THE LAST 12 MONTHS, WAS THERE A TIME WHEN YOU WERE NOT ABLE TO PAY THE MORTGAGE OR RENT ON TIME?: NO

## 2025-02-19 ASSESSMENT — PATIENT HEALTH QUESTIONNAIRE - PHQ9
SUM OF ALL RESPONSES TO PHQ QUESTIONS 1-9: 0
2. FEELING DOWN, DEPRESSED OR HOPELESS: NOT AT ALL
1. LITTLE INTEREST OR PLEASURE IN DOING THINGS: NOT AT ALL
SUM OF ALL RESPONSES TO PHQ QUESTIONS 1-9: 0
SUM OF ALL RESPONSES TO PHQ9 QUESTIONS 1 & 2: 0
SUM OF ALL RESPONSES TO PHQ QUESTIONS 1-9: 0
SUM OF ALL RESPONSES TO PHQ QUESTIONS 1-9: 0
1. LITTLE INTEREST OR PLEASURE IN DOING THINGS: NOT AT ALL
2. FEELING DOWN, DEPRESSED OR HOPELESS: NOT AT ALL
SUM OF ALL RESPONSES TO PHQ9 QUESTIONS 1 & 2: 0

## 2025-02-20 ENCOUNTER — OFFICE VISIT (OUTPATIENT)
Dept: FAMILY MEDICINE CLINIC | Age: 80
End: 2025-02-20

## 2025-02-20 VITALS
DIASTOLIC BLOOD PRESSURE: 60 MMHG | OXYGEN SATURATION: 97 % | HEIGHT: 61 IN | WEIGHT: 151.4 LBS | BODY MASS INDEX: 28.58 KG/M2 | TEMPERATURE: 96.8 F | HEART RATE: 76 BPM | SYSTOLIC BLOOD PRESSURE: 106 MMHG

## 2025-02-20 DIAGNOSIS — E66.3 OVERWEIGHT (BMI 25.0-29.9): ICD-10-CM

## 2025-02-20 DIAGNOSIS — R09.82 POST-NASAL DRIP: ICD-10-CM

## 2025-02-20 DIAGNOSIS — E78.2 MIXED HYPERLIPIDEMIA: ICD-10-CM

## 2025-02-20 DIAGNOSIS — I10 PRIMARY HYPERTENSION: Primary | ICD-10-CM

## 2025-02-20 DIAGNOSIS — R73.9 HYPERGLYCEMIA: ICD-10-CM

## 2025-02-20 DIAGNOSIS — R73.03 PREDIABETES: ICD-10-CM

## 2025-02-20 DIAGNOSIS — E55.9 VITAMIN D DEFICIENCY: ICD-10-CM

## 2025-02-20 DIAGNOSIS — E03.9 HYPOTHYROIDISM, UNSPECIFIED TYPE: ICD-10-CM

## 2025-02-20 LAB — HBA1C MFR BLD: 6.1 %

## 2025-02-20 RX ORDER — METFORMIN HYDROCHLORIDE 500 MG/1
500 TABLET, EXTENDED RELEASE ORAL
Qty: 30 TABLET | Refills: 1 | Status: SHIPPED | OUTPATIENT
Start: 2025-02-20

## 2025-02-20 ASSESSMENT — ENCOUNTER SYMPTOMS
PHOTOPHOBIA: 0
SHORTNESS OF BREATH: 0
ABDOMINAL PAIN: 0
CHEST TIGHTNESS: 0
ABDOMINAL DISTENTION: 0

## 2025-02-20 NOTE — PATIENT INSTRUCTIONS
Patient declines medication for postnasal drip.    Patient is interested in initiating metformin for control of prediabetes combined with potential weight loss     Will adjust vitamin D dosing to 5000 international units daily and recheck labs in the next couple months.  Orders already exists.

## 2025-02-20 NOTE — PROGRESS NOTES
Diagnosis Orders   1. Primary hypertension        2. Prediabetes  metFORMIN (GLUCOPHAGE-XR) 500 MG extended release tablet      3. Overweight (BMI 25.0-29.9)        4. Hypothyroidism, unspecified type        5. Mixed hyperlipidemia        6. Vitamin D deficiency  vitamin D (CHOLECALCIFEROL) 125 MCG (5000 UT) CAPS capsule      7. Hyperglycemia  POCT glycosylated hemoglobin (Hb A1C)      8. Post-nasal drip          Return in about 6 weeks (around 4/3/2025) for for review of outcome of today's recommendation.  Patient Instructions   Patient declines medication for postnasal drip.    Patient is interested in initiating metformin for control of prediabetes combined with potential weight loss     Will adjust vitamin D dosing to 5000 international units daily and recheck labs in the next couple months.  Orders already exists.        Subjective:      Patient ID: Sarah Vail is a 79 y.o. female who presents for:  Chief Complaint   Patient presents with    Discuss Labs     Pt was told she was deficient in vit-d. She has a multivitamin she takes with 1000UI of vitamin D. So she is taking 2000UI total daily.     Pt wondering if she should continue with 2000UI, down it to 1000UI, or if she should possibly even increase.    No other concerns for today.    Discuss Medications     Pt was previously using efudex cream on the arms/legs, she did just complete this. However, she wonders if this was causing any side effects.       HPI  Pt was told she was deficient in vit-d. She has a multivitamin she takes with 1000UI of vitamin D. So she is taking 2000UI total daily. Pt wondering if she should continue with 2000UI, down it to 1000UI, or if she should possibly even increase. No other concerns for today.      Discuss Medications Pt was previously using efudex cream on the arms/legs, she did just complete this. However, she wonders if this was causing any side effects.     Reviewed Efudex and the lack of likelihood of leading to side

## 2025-04-07 ENCOUNTER — OFFICE VISIT (OUTPATIENT)
Dept: FAMILY MEDICINE CLINIC | Age: 80
End: 2025-04-07
Payer: MEDICARE

## 2025-04-07 VITALS
HEIGHT: 61 IN | SYSTOLIC BLOOD PRESSURE: 132 MMHG | WEIGHT: 148 LBS | BODY MASS INDEX: 27.94 KG/M2 | DIASTOLIC BLOOD PRESSURE: 66 MMHG

## 2025-04-07 DIAGNOSIS — R06.02 SHORTNESS OF BREATH: ICD-10-CM

## 2025-04-07 DIAGNOSIS — I49.9 IRREGULAR HEART RATE: ICD-10-CM

## 2025-04-07 DIAGNOSIS — I10 ESSENTIAL HYPERTENSION: ICD-10-CM

## 2025-04-07 DIAGNOSIS — Z87.891 HISTORY OF TOBACCO USE: ICD-10-CM

## 2025-04-07 DIAGNOSIS — E78.2 MIXED HYPERLIPIDEMIA: ICD-10-CM

## 2025-04-07 DIAGNOSIS — I48.91 NEW ONSET ATRIAL FIBRILLATION (HCC): Primary | ICD-10-CM

## 2025-04-07 DIAGNOSIS — R73.03 PREDIABETES: ICD-10-CM

## 2025-04-07 DIAGNOSIS — E66.3 OVERWEIGHT (BMI 25.0-29.9): ICD-10-CM

## 2025-04-07 DIAGNOSIS — I10 PRIMARY HYPERTENSION: ICD-10-CM

## 2025-04-07 PROCEDURE — 3075F SYST BP GE 130 - 139MM HG: CPT | Performed by: FAMILY MEDICINE

## 2025-04-07 PROCEDURE — 1123F ACP DISCUSS/DSCN MKR DOCD: CPT | Performed by: FAMILY MEDICINE

## 2025-04-07 PROCEDURE — 3078F DIAST BP <80 MM HG: CPT | Performed by: FAMILY MEDICINE

## 2025-04-07 PROCEDURE — 99215 OFFICE O/P EST HI 40 MIN: CPT | Performed by: FAMILY MEDICINE

## 2025-04-07 PROCEDURE — 1160F RVW MEDS BY RX/DR IN RCRD: CPT | Performed by: FAMILY MEDICINE

## 2025-04-07 PROCEDURE — 1159F MED LIST DOCD IN RCRD: CPT | Performed by: FAMILY MEDICINE

## 2025-04-07 PROCEDURE — 1126F AMNT PAIN NOTED NONE PRSNT: CPT | Performed by: FAMILY MEDICINE

## 2025-04-07 PROCEDURE — 93000 ELECTROCARDIOGRAM COMPLETE: CPT | Performed by: FAMILY MEDICINE

## 2025-04-07 RX ORDER — METFORMIN HYDROCHLORIDE 500 MG/1
500 TABLET, EXTENDED RELEASE ORAL
Qty: 90 TABLET | Refills: 3 | Status: SHIPPED | OUTPATIENT
Start: 2025-04-07

## 2025-04-07 RX ORDER — ROSUVASTATIN CALCIUM 5 MG/1
5 TABLET, COATED ORAL NIGHTLY
Qty: 90 TABLET | Refills: 3 | Status: SHIPPED | OUTPATIENT
Start: 2025-04-07

## 2025-04-07 ASSESSMENT — ENCOUNTER SYMPTOMS
ABDOMINAL DISTENTION: 0
SHORTNESS OF BREATH: 1
PHOTOPHOBIA: 0
CHEST TIGHTNESS: 0
ABDOMINAL PAIN: 0

## 2025-04-07 NOTE — PATIENT INSTRUCTIONS
Patient is being sent for urgent cardiology consult for new onset atrial fibrillation.  Patient is already on carvedilol.    Shortness of breath will further be investigated by PFT.    Prediabetes under good control no changes at this time.  Most recent hemoglobin A1c was 6.1 and patient is tolerating metformin.  90-day refill sent    Hypertension under good control no changes at this time.  Refill medications for hypertension and hyperlipidemia.

## 2025-04-07 NOTE — PROGRESS NOTES
Requested:   1    EKG 12 Lead     Reason for Exam?:   Irregular heart rate    Full PFT Study With Bronchodilator     Standing Status:   Future     Expected Date:   4/21/2025     Expiration Date:   4/7/2026       Orders Placed This Encounter   Medications    metFORMIN (GLUCOPHAGE-XR) 500 MG extended release tablet     Sig: Take 1 tablet by mouth daily (with breakfast)     Dispense:  90 tablet     Refill:  3    rosuvastatin (CRESTOR) 5 MG tablet     Sig: Take 1 tablet by mouth nightly     Dispense:  90 tablet     Refill:  3          Medication List            Accurate as of April 7, 2025 11:14 AM. If you have any questions, ask your nurse or doctor.                CONTINUE taking these medications      acyclovir 5 % ointment  Commonly known as: ZOVIRAX  Apply 6 times per day for 7 day     carvedilol 6.25 MG tablet  Commonly known as: COREG  TAKE 1 TABLET TWICE A DAY     estradiol 0.1 MG/GM vaginal cream  Commonly known as: ESTRACE VAGINAL  Place 1 g vaginally daily     levothyroxine 50 MCG tablet  Commonly known as: SYNTHROID  Take 1 tablet by mouth Daily     metFORMIN 500 MG extended release tablet  Commonly known as: GLUCOPHAGE-XR  Take 1 tablet by mouth daily (with breakfast)     MULTIPLE VITAMINS/WOMENS PO     rosuvastatin 5 MG tablet  Commonly known as: Crestor  Take 1 tablet by mouth nightly     vitamin D 125 MCG (5000 UT) Caps capsule  Commonly known as: CHOLECALCIFEROL  Take 1 capsule by mouth daily               Where to Get Your Medications        [facility] - [facility] - P [phone] - F [phone]  [facility] [address]      Phone: [phone]   metFORMIN 500 MG extended release tablet  rosuvastatin 5 MG tablet           Plan:   Return for with the referal specialist.    Patient Instructions   Patient is being sent for urgent cardiology consult for new onset atrial fibrillation.  Patient is

## 2025-04-14 ENCOUNTER — OFFICE VISIT (OUTPATIENT)
Age: 80
End: 2025-04-14

## 2025-04-14 VITALS
HEART RATE: 71 BPM | WEIGHT: 148.4 LBS | SYSTOLIC BLOOD PRESSURE: 98 MMHG | DIASTOLIC BLOOD PRESSURE: 62 MMHG | BODY MASS INDEX: 28.04 KG/M2 | OXYGEN SATURATION: 97 %

## 2025-04-14 DIAGNOSIS — R06.02 SHORTNESS OF BREATH: ICD-10-CM

## 2025-04-14 DIAGNOSIS — I10 HYPERTENSION, UNSPECIFIED TYPE: ICD-10-CM

## 2025-04-14 DIAGNOSIS — I48.0 PAROXYSMAL ATRIAL FIBRILLATION (HCC): Primary | ICD-10-CM

## 2025-04-14 DIAGNOSIS — E78.5 DYSLIPIDEMIA: ICD-10-CM

## 2025-04-14 ASSESSMENT — ENCOUNTER SYMPTOMS
NAUSEA: 0
STRIDOR: 0
BLOOD IN STOOL: 0
COUGH: 0
CHEST TIGHTNESS: 0
EYES NEGATIVE: 1
GASTROINTESTINAL NEGATIVE: 1
SHORTNESS OF BREATH: 1
WHEEZING: 0

## 2025-04-14 NOTE — PROGRESS NOTES
NEW PATIENT        Patient: Sarah Vail  YOB: 1945  MRN: [id number]    Chief Complaint:  Chief Complaint   Patient presents with    Establish Cardiologist     Referred by   New onset AFIB       CV Data:    Subjective/HPI referred for newly discovered AF.  She does not feel palps but episodically gets FRANCO w exercise.  She is very active and most of the time she is fine.      EKG: AF 73     Former smoker  Occ ETOH  Lives alone  Retired - school admin/teacher.  ++FH    Past Medical History:   Diagnosis Date    Abnormal EKG     BPPV (benign paroxysmal positional vertigo)     Colon polyps 09/2016    needs f/u in 5 years    Elevated liver enzymes 11/5/2013    Family history of heart disease     Female bladder prolapse     Heart murmur     History of bone density study 4/7/11    History of mammography, diagnostic 4/13/11    Category 3 Left mammogram/ Category 2 Right mammogram    Hyperlipidemia     on meds > 10 yrs    Hypertension     on meds > 10 yrs    Hypothyroidism 1/9/2021    Internal hemorrhoids     Normal cardiac stress test 12/09/2015    Osteoarthritis, multiple sites     shoulders     Osteopenia 06/09/2015    -1.4; -1.5 9/2018    Recurrent cold sores     Rheumatic fever     Trigger thumb of left hand        Past Surgical History:   Procedure Laterality Date    COLONOSCOPY  07/12/2006    COLONOSCOPY  09/12/2016    EN MYERS MD    COLONOSCOPY N/A 06/09/2022    COLONOSCOPY DIAGNOSTIC performed by Zhang Myers MD at [facility] CENTER    HYSTERECTOMY VAGINAL N/A 02/14/2019    [facility] BSO performed by Shadi Nogueira MD at [facility] [place]    HYSTERECTOMY, TOTAL ABDOMINAL (CERVIX REMOVED)  2/?    ME DELIGATION URETER N/A 02/14/2019    USLS POSSIBLE SSLS performed by Shadi Nogueira MD at [facility]    ME TENDON SHEATH INCISION Left 05/09/2017    LEFT HAND  FINGER TRIGGER RELEASE THUMB, SUPINE  performed by Keyur Solorzano MD at [facility] OR    VAGINA SURGERY N/A 02/14/2019    A-P REPAIR performed by Shadi GATICA

## 2025-04-23 ENCOUNTER — TELEPHONE (OUTPATIENT)
Age: 80
End: 2025-04-23

## 2025-04-23 DIAGNOSIS — K62.5 RECTAL BLEEDING: Primary | ICD-10-CM

## 2025-04-23 DIAGNOSIS — K92.1 BLOOD IN STOOL: Primary | ICD-10-CM

## 2025-04-23 NOTE — TELEPHONE ENCOUNTER
Pt called in and states that she has a couple hemorrhoids that are bleeding only when she goes to the bathroom. Pt is concerned because she is on Eliquis she does not want to bleed out. What should pt do? Please advise     This is a dr cho pt

## 2025-04-24 ENCOUNTER — HOSPITAL ENCOUNTER (OUTPATIENT)
Dept: PULMONOLOGY | Age: 80
Discharge: HOME | End: 2025-04-24
Payer: MEDICARE

## 2025-04-24 DIAGNOSIS — Z87.891 HISTORY OF TOBACCO USE: ICD-10-CM

## 2025-04-24 DIAGNOSIS — R06.02 SHORTNESS OF BREATH: ICD-10-CM

## 2025-04-24 DIAGNOSIS — K92.1 BLOOD IN STOOL: ICD-10-CM

## 2025-04-24 LAB
ERYTHROCYTE [DISTWIDTH] IN BLOOD BY AUTOMATED COUNT: 13.3 % (ref 11.5–14.5)
HCT VFR BLD AUTO: 49.5 % (ref 37–47)
HGB BLD-MCNC: 16.5 G/DL (ref 12–16)
MCH RBC QN AUTO: 30.4 PG (ref 27–31.3)
MCHC RBC AUTO-ENTMCNC: 33.3 % (ref 33–37)
MCV RBC AUTO: 91.3 FL (ref 79.4–94.8)
PLATELET # BLD AUTO: 170 K/UL (ref 130–400)
RBC # BLD AUTO: 5.42 M/UL (ref 4.2–5.4)
WBC # BLD AUTO: 8.5 K/UL (ref 4.8–10.8)

## 2025-04-24 PROCEDURE — 94726 PLETHYSMOGRAPHY LUNG VOLUMES: CPT

## 2025-04-24 PROCEDURE — 94729 DIFFUSING CAPACITY: CPT

## 2025-04-24 PROCEDURE — 94060 EVALUATION OF WHEEZING: CPT

## 2025-04-24 PROCEDURE — 6360000002 HC RX W HCPCS

## 2025-04-24 RX ORDER — ALBUTEROL SULFATE 0.83 MG/ML
SOLUTION RESPIRATORY (INHALATION)
Status: COMPLETED
Start: 2025-04-24 | End: 2025-04-24

## 2025-04-24 RX ADMIN — ALBUTEROL SULFATE 2.5 MG: 2.5 SOLUTION RESPIRATORY (INHALATION) at 13:16

## 2025-04-28 NOTE — TELEPHONE ENCOUNTER
Per Dr. Hyman:  Blood counts look fine     Called and spoke to patient. Notified patient of Dr. Hyman's response. Patient will resume Eliquis today. Patient is concerned with the bleeding coming from her hemorrhoids. She has an appointment with her PCP tomorrow and will ask her about who she should see for the hemorrhoids.

## 2025-04-28 NOTE — TELEPHONE ENCOUNTER
Patient calling for results of cbc that was drawn.     Lab Results   Component Value Date    WBC 8.5 04/24/2025    HGB 16.5 (H) 04/24/2025    HCT 49.5 (H) 04/24/2025    MCV 91.3 04/24/2025     04/24/2025

## 2025-04-29 ENCOUNTER — RESULTS FOLLOW-UP (OUTPATIENT)
Dept: FAMILY MEDICINE CLINIC | Age: 80
End: 2025-04-29

## 2025-04-29 ENCOUNTER — OFFICE VISIT (OUTPATIENT)
Dept: FAMILY MEDICINE CLINIC | Age: 80
End: 2025-04-29
Payer: MEDICARE

## 2025-04-29 VITALS
HEIGHT: 61 IN | DIASTOLIC BLOOD PRESSURE: 64 MMHG | SYSTOLIC BLOOD PRESSURE: 98 MMHG | BODY MASS INDEX: 28.32 KG/M2 | HEART RATE: 86 BPM | OXYGEN SATURATION: 96 % | WEIGHT: 150 LBS

## 2025-04-29 DIAGNOSIS — K62.5 RECTAL BLEED: Primary | ICD-10-CM

## 2025-04-29 DIAGNOSIS — K64.4 EXTERNAL HEMORRHOID: ICD-10-CM

## 2025-04-29 DIAGNOSIS — J44.9 STAGE 1 MILD COPD BY GOLD CLASSIFICATION (HCC): ICD-10-CM

## 2025-04-29 PROCEDURE — 3078F DIAST BP <80 MM HG: CPT | Performed by: FAMILY MEDICINE

## 2025-04-29 PROCEDURE — 1123F ACP DISCUSS/DSCN MKR DOCD: CPT | Performed by: FAMILY MEDICINE

## 2025-04-29 PROCEDURE — 3074F SYST BP LT 130 MM HG: CPT | Performed by: FAMILY MEDICINE

## 2025-04-29 PROCEDURE — 99214 OFFICE O/P EST MOD 30 MIN: CPT | Performed by: FAMILY MEDICINE

## 2025-04-29 PROCEDURE — 1126F AMNT PAIN NOTED NONE PRSNT: CPT | Performed by: FAMILY MEDICINE

## 2025-04-29 PROCEDURE — 1160F RVW MEDS BY RX/DR IN RCRD: CPT | Performed by: FAMILY MEDICINE

## 2025-04-29 PROCEDURE — 1159F MED LIST DOCD IN RCRD: CPT | Performed by: FAMILY MEDICINE

## 2025-04-29 NOTE — PROGRESS NOTES
Diagnosis Orders   1. Rectal bleed  Pratima Bunch MD, General Surgery, [place]      2. External hemorrhoid  Pratima Bunch MD, General Surgery, [place]      3. Stage 1 mild COPD by GOLD classification (HCC)            See patient instructions for further assessment/plan specifics    Return in about 18 weeks (around 9/2/2025) for MAW exam due.  Patient Instructions   Patient to be referred to general surgery for potential banding and/or consideration of internal hemorrhoid revision if indicated.    PFT reveals mild COPD.  Patient declines referral to pulmonology.  States she does not feel symptomatic enough to do anything about this at this time.      Subjective:      Patient ID: Sarah Vail is a 79 y.o. female who presents for:  Chief Complaint   Patient presents with    Hypertension     F/u    Coming back from cardiologist she states, pt is bleeding and has possible hemorrhoids, states they are external hemorrhoids, and was advised to see PCP    Pt did have blood test done on Thursday, CBC, and came back fine.       HPI  Bleeding into the toilet from the rectum.  She can feel external hemorrhoids.  She wonders if she might have 1 internal hemorrhoid.  It is worse now taking Eliquis for her atrial fibrillation but realizes she needs to remain on this as long as her hemoglobin remains normal.  Cardiology did check that and hemoglobin and hematocrit were normal.  Documentation reviewed.    Patient states she just was notified her PFT result is back and would like to review that.  Documentation reviewed with patient        Current Outpatient Medications on File Prior to Visit   Medication Sig Dispense Refill    apixaban (ELIQUIS) 5 MG TABS tablet Take 1 tablet by mouth 2 times daily 180 tablet 1    metFORMIN (GLUCOPHAGE-XR) 500 MG extended release tablet Take 1 tablet by mouth daily (with breakfast) 90 tablet 3    vitamin D (CHOLECALCIFEROL) 125 MCG (5000 UT) CAPS capsule Take 1 capsule by mouth daily

## 2025-04-29 NOTE — PATIENT INSTRUCTIONS
Patient to be referred to general surgery for potential banding and/or consideration of internal hemorrhoid revision if indicated.

## 2025-05-05 ENCOUNTER — OFFICE VISIT (OUTPATIENT)
Age: 80
End: 2025-05-05
Payer: MEDICARE

## 2025-05-05 ENCOUNTER — RESULTS FOLLOW-UP (OUTPATIENT)
Dept: FAMILY MEDICINE CLINIC | Age: 80
End: 2025-05-05

## 2025-05-05 VITALS — HEART RATE: 65 BPM | HEIGHT: 61 IN | WEIGHT: 150 LBS | BODY MASS INDEX: 28.32 KG/M2 | OXYGEN SATURATION: 97 %

## 2025-05-05 DIAGNOSIS — K64.1 GRADE II HEMORRHOIDS: Primary | ICD-10-CM

## 2025-05-05 DIAGNOSIS — I48.0 PAROXYSMAL ATRIAL FIBRILLATION (HCC): ICD-10-CM

## 2025-05-05 DIAGNOSIS — R73.03 PREDIABETES: ICD-10-CM

## 2025-05-05 DIAGNOSIS — E55.9 VITAMIN D DEFICIENCY: ICD-10-CM

## 2025-05-05 DIAGNOSIS — E78.2 MIXED HYPERLIPIDEMIA: ICD-10-CM

## 2025-05-05 LAB
CHOLEST SERPL-MCNC: 123 MG/DL (ref 0–199)
ESTIMATED AVERAGE GLUCOSE: 128 MG/DL
HBA1C MFR BLD: 6.1 % (ref 4–6)
HDLC SERPL-MCNC: 38 MG/DL (ref 40–59)
LDLC SERPL CALC-MCNC: 50 MG/DL (ref 0–129)
TRIGL SERPL-MCNC: 174 MG/DL (ref 0–150)
VITAMIN D 25-HYDROXY: 34 NG/ML (ref 30–100)

## 2025-05-05 PROCEDURE — 1123F ACP DISCUSS/DSCN MKR DOCD: CPT | Performed by: SURGERY

## 2025-05-05 PROCEDURE — 99203 OFFICE O/P NEW LOW 30 MIN: CPT | Performed by: SURGERY

## 2025-05-05 PROCEDURE — 1159F MED LIST DOCD IN RCRD: CPT | Performed by: SURGERY

## 2025-05-05 NOTE — PROGRESS NOTES
GENERAL SURGERY CLINIC NOTE    Pt Name: Sarah Vail  MRN: [id number]  Date: 5/5/2025        SUBJECTIVE:     History of Chief Complaint:    Sarah is a 79 y.o. female who presents with rectal bleeding after starting eliquis seondary to aFib. She is stating that she had hemorrhoids and she had a bleeding from her hemorrhoids after starting eliquis. She didn't have any bleeding for the last 2 weeks.        Past Medical History:   Diagnosis Date    Abnormal EKG     BPPV (benign paroxysmal positional vertigo)     Colon polyps 09/2016    needs f/u in 5 years    Elevated liver enzymes 11/5/2013    Family history of heart disease     Female bladder prolapse     Heart murmur     History of bone density study 4/7/11    History of mammography, diagnostic 4/13/11    Category 3 Left mammogram/ Category 2 Right mammogram    Hyperlipidemia     on meds > 10 yrs    Hypertension     on meds > 10 yrs    Hypothyroidism 1/9/2021    Internal hemorrhoids     Normal cardiac stress test 12/09/2015    Osteoarthritis, multiple sites     shoulders     Osteopenia 06/09/2015    -1.4; -1.5 9/2018    Recurrent cold sores     Rheumatic fever     Stage 1 mild COPD by GOLD classification ([facility]) 4/29/2025    Trigger thumb of left hand      Past Surgical History:   Procedure Laterality Date    COLONOSCOPY  07/12/2006    COLONOSCOPY  09/12/2016    EN MYERS MD    COLONOSCOPY N/A 06/09/2022    COLONOSCOPY DIAGNOSTIC performed by Zhang Myers MD at [facility] CENTER    HYSTERECTOMY VAGINAL N/A 02/14/2019    [facility] BSO performed by Shadi Nogueira MD at [facility] OR    HYSTERECTOMY, TOTAL ABDOMINAL (CERVIX REMOVED)  2/?    ME DELIGATION URETER N/A 02/14/2019    USLS POSSIBLE SSLS performed by Shadi Nogueira MD at [facility] OR    ME TENDON SHEATH INCISION Left 05/09/2017    LEFT HAND  FINGER TRIGGER RELEASE THUMB, SUPINE  performed by Keyur Solorzano MD at [facility] OR    VAGINA SURGERY N/A 02/14/2019    A-P REPAIR performed by Shadi Nogueira MD at [facility] OR     Prior to

## 2025-05-28 ENCOUNTER — HOSPITAL ENCOUNTER (OUTPATIENT)
Age: 80
Discharge: HOME | End: 2025-05-30
Attending: INTERNAL MEDICINE
Payer: MEDICARE

## 2025-05-28 ENCOUNTER — HOSPITAL ENCOUNTER (OUTPATIENT)
Dept: NUCLEAR MEDICINE | Age: 80
Discharge: HOME | End: 2025-05-30
Attending: INTERNAL MEDICINE
Payer: MEDICARE

## 2025-05-28 DIAGNOSIS — I48.0 PAROXYSMAL ATRIAL FIBRILLATION (HCC): ICD-10-CM

## 2025-05-28 DIAGNOSIS — R06.02 SHORTNESS OF BREATH: ICD-10-CM

## 2025-05-28 LAB
ECHO AO ROOT DIAM: 3 CM
ECHO AV AREA PEAK VELOCITY: 0.9 CM2
ECHO AV AREA PLAN: 1.2 CM2
ECHO AV AREA VTI: 0.9 CM2
ECHO AV CUSP MM: 1.3 CM
ECHO AV MEAN GRADIENT: 6 MMHG
ECHO AV MEAN VELOCITY: 1.1 M/S
ECHO AV PEAK GRADIENT: 10 MMHG
ECHO AV PEAK VELOCITY: 1.9 M/S
ECHO AV VELOCITY RATIO: 0.53
ECHO AV VTI: 37.5 CM
ECHO EST RA PRESSURE: 3 MMHG
ECHO LA DIAMETER: 3.9 CM
ECHO LA TO AORTIC ROOT RATIO: 1.3
ECHO LA VOL A-L A2C: 69 ML (ref 22–52)
ECHO LA VOL A-L A4C: 109 ML (ref 22–52)
ECHO LA VOL MOD A2C: 66 ML (ref 22–52)
ECHO LA VOL MOD A4C: 103 ML (ref 22–52)
ECHO LA VOLUME AREA LENGTH: 88 ML
ECHO LV E' LATERAL VELOCITY: 13.98 CM/S
ECHO LV E' SEPTAL VELOCITY: 8.33 CM/S
ECHO LV EDV A2C: 58 ML
ECHO LV EDV A4C: 53 ML
ECHO LV EDV BP: 56 ML (ref 56–104)
ECHO LV EF PHYSICIAN: 55 %
ECHO LV EJECTION FRACTION A2C: 62 %
ECHO LV EJECTION FRACTION A4C: 45 %
ECHO LV EJECTION FRACTION BIPLANE: 53 % (ref 55–100)
ECHO LV ESV A2C: 22 ML
ECHO LV ESV A4C: 29 ML
ECHO LV ESV BP: 26 ML (ref 19–49)
ECHO LV FRACTIONAL SHORTENING: 34 % (ref 28–44)
ECHO LV INTERNAL DIMENSION DIASTOLIC: 4.1 CM (ref 3.9–5.3)
ECHO LV INTERNAL DIMENSION SYSTOLIC: 2.7 CM
ECHO LV IVSD: 0.8 CM (ref 0.6–0.9)
ECHO LV IVSS: 1.1 CM
ECHO LV MASS 2D: 132.1 G (ref 67–162)
ECHO LV POSTERIOR WALL DIASTOLIC: 1.2 CM (ref 0.6–0.9)
ECHO LV POSTERIOR WALL SYSTOLIC: 1.6 CM
ECHO LV RELATIVE WALL THICKNESS RATIO: 0.59
ECHO LVOT AREA: 1.8 CM2
ECHO LVOT AV VTI INDEX: 0.48
ECHO LVOT DIAM: 1.5 CM
ECHO LVOT MEAN GRADIENT: 2 MMHG
ECHO LVOT PEAK GRADIENT: 3 MMHG
ECHO LVOT PEAK VELOCITY: 1 M/S
ECHO LVOT SV: 31.8 ML
ECHO LVOT VTI: 18 CM
ECHO MV E VELOCITY: 1.01 M/S
ECHO MV E/E' LATERAL: 7.22
ECHO MV E/E' RATIO (AVERAGED): 9.67
ECHO MV E/E' SEPTAL: 12.12
ECHO PULMONARY ARTERY END DIASTOLIC PRESSURE: 4 MMHG
ECHO PV MAX VELOCITY: 0.9 M/S
ECHO PV PEAK GRADIENT: 3 MMHG
ECHO PV REGURGITANT MAX VELOCITY: 0.9 M/S
ECHO RIGHT VENTRICULAR SYSTOLIC PRESSURE (RVSP): 23 MMHG
ECHO RV INTERNAL DIMENSION: 2.9 CM
ECHO RV TAPSE: 1.7 CM (ref 1.7–?)
ECHO TV REGURGITANT MAX VELOCITY: 2.25 M/S
ECHO TV REGURGITANT PEAK GRADIENT: 20 MMHG
NUC STRESS EJECTION FRACTION: 71 %
STRESS BASELINE DIAS BP: 96 MMHG
STRESS BASELINE HR: 95 BPM
STRESS BASELINE ST DEPRESSION: 0 MM
STRESS BASELINE SYS BP: 141 MMHG
STRESS ESTIMATED WORKLOAD: 1 METS
STRESS PEAK DIAS BP: 96 MMHG
STRESS PEAK SYS BP: 141 MMHG
STRESS PERCENT HR ACHIEVED: 96 %
STRESS POST PEAK HR: 136 BPM
STRESS RATE PRESSURE PRODUCT: NORMAL BPM*MMHG
STRESS ST DEPRESSION: 0 MM
STRESS TARGET HR: 141 BPM
TID: 1.12

## 2025-05-28 PROCEDURE — 93017 CV STRESS TEST TRACING ONLY: CPT

## 2025-05-28 PROCEDURE — 3430000000 HC RX DIAGNOSTIC RADIOPHARMACEUTICAL: Performed by: INTERNAL MEDICINE

## 2025-05-28 PROCEDURE — A9502 TC99M TETROFOSMIN: HCPCS | Performed by: INTERNAL MEDICINE

## 2025-05-28 PROCEDURE — 93306 TTE W/DOPPLER COMPLETE: CPT

## 2025-05-28 PROCEDURE — 93306 TTE W/DOPPLER COMPLETE: CPT | Performed by: INTERNAL MEDICINE

## 2025-05-28 PROCEDURE — 6360000002 HC RX W HCPCS: Performed by: INTERNAL MEDICINE

## 2025-05-28 PROCEDURE — 2500000003 HC RX 250 WO HCPCS: Performed by: INTERNAL MEDICINE

## 2025-05-28 PROCEDURE — 78452 HT MUSCLE IMAGE SPECT MULT: CPT

## 2025-05-28 RX ORDER — SODIUM CHLORIDE 0.9 % (FLUSH) 0.9 %
10 SYRINGE (ML) INJECTION PRN
Status: DISCONTINUED | OUTPATIENT
Start: 2025-05-28 | End: 2025-05-31 | Stop reason: HOSPADM

## 2025-05-28 RX ORDER — REGADENOSON 0.08 MG/ML
0.4 INJECTION, SOLUTION INTRAVENOUS
Status: COMPLETED | OUTPATIENT
Start: 2025-05-28 | End: 2025-05-28

## 2025-05-28 RX ADMIN — REGADENOSON 0.4 MG: 0.08 INJECTION, SOLUTION INTRAVENOUS at 09:55

## 2025-05-28 RX ADMIN — TETROFOSMIN 11.8 MILLICURIE: 1.38 INJECTION, POWDER, LYOPHILIZED, FOR SOLUTION INTRAVENOUS at 08:45

## 2025-05-28 RX ADMIN — SODIUM CHLORIDE, PRESERVATIVE FREE 10 ML: 5 INJECTION INTRAVENOUS at 09:54

## 2025-05-28 RX ADMIN — TETROFOSMIN 35.7 MILLICURIE: 1.38 INJECTION, POWDER, LYOPHILIZED, FOR SOLUTION INTRAVENOUS at 09:53

## 2025-05-28 RX ADMIN — SODIUM CHLORIDE, PRESERVATIVE FREE 10 ML: 5 INJECTION INTRAVENOUS at 09:56

## 2025-06-04 PROBLEM — R06.02 SHORTNESS OF BREATH: Status: ACTIVE | Noted: 2025-06-04

## 2025-06-05 ENCOUNTER — OFFICE VISIT (OUTPATIENT)
Age: 80
End: 2025-06-05
Payer: MEDICARE

## 2025-06-05 VITALS
BODY MASS INDEX: 27.4 KG/M2 | SYSTOLIC BLOOD PRESSURE: 108 MMHG | WEIGHT: 145 LBS | RESPIRATION RATE: 16 BRPM | HEART RATE: 83 BPM | OXYGEN SATURATION: 98 % | DIASTOLIC BLOOD PRESSURE: 74 MMHG

## 2025-06-05 DIAGNOSIS — I10 PRIMARY HYPERTENSION: Primary | ICD-10-CM

## 2025-06-05 PROCEDURE — 93000 ELECTROCARDIOGRAM COMPLETE: CPT | Performed by: INTERNAL MEDICINE

## 2025-06-05 PROCEDURE — 99214 OFFICE O/P EST MOD 30 MIN: CPT | Performed by: INTERNAL MEDICINE

## 2025-06-05 PROCEDURE — 1123F ACP DISCUSS/DSCN MKR DOCD: CPT | Performed by: INTERNAL MEDICINE

## 2025-06-05 PROCEDURE — 1159F MED LIST DOCD IN RCRD: CPT | Performed by: INTERNAL MEDICINE

## 2025-06-05 PROCEDURE — 3074F SYST BP LT 130 MM HG: CPT | Performed by: INTERNAL MEDICINE

## 2025-06-05 PROCEDURE — 3078F DIAST BP <80 MM HG: CPT | Performed by: INTERNAL MEDICINE

## 2025-06-05 ASSESSMENT — ENCOUNTER SYMPTOMS
COUGH: 0
NAUSEA: 0
STRIDOR: 0
BLOOD IN STOOL: 0
WHEEZING: 0
EYES NEGATIVE: 1
CHEST TIGHTNESS: 0
SHORTNESS OF BREATH: 1
GASTROINTESTINAL NEGATIVE: 1

## 2025-06-05 NOTE — PROGRESS NOTES
OFFICE VISIT        Patient: Sarah Vail  YOB: 1945  MRN: [id number]    Chief Complaint:  Chief Complaint   Patient presents with    Results       CV Data:  5/25 EM AF - 100%.  One 2.5 Second pause noted. Occ PVCs.   5/25 Echo EF 50-55 Mild MR Severely Dilated LA  5/25 SPECT Negative EF 71    Subjective/HPI referred for newly discovered AF.  She does not feel palps but episodically gets FRANCO w exercise.  She is very active and most of the time she is fine.     6/5/25 still FRANCO w exertion. 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    Past Medical History:   Diagnosis Date    Abnormal EKG     BPPV (benign paroxysmal positional vertigo)     Colon polyps 09/2016    needs f/u in 5 years    Elevated liver enzymes 11/5/2013    Family history of heart disease     Female bladder prolapse     Heart murmur     History of bone density study 4/7/11    History of mammography, diagnostic 4/13/11    Category 3 Left mammogram/ Category 2 Right mammogram    Hyperlipidemia     on meds > 10 yrs    Hypertension     on meds > 10 yrs    Hypothyroidism 1/9/2021    Internal hemorrhoids     Normal cardiac stress test 12/09/2015    Osteoarthritis, multiple sites     shoulders     Osteopenia 06/09/2015    -1.4; -1.5 9/2018    Recurrent cold sores     Rheumatic fever     Stage 1 mild COPD by GOLD classification (HCC) 4/29/2025    Trigger thumb of left hand        Past Surgical History:   Procedure Laterality Date    COLONOSCOPY  07/12/2006    COLONOSCOPY  09/12/2016    EN MYERS MD    COLONOSCOPY N/A 06/09/2022    COLONOSCOPY DIAGNOSTIC performed by Zhang Myers MD at [facility] CENTER    HYSTERECTOMY VAGINAL N/A 02/14/2019    [facility] BSO performed by Shadi Nogueira MD at [facility]    HYSTERECTOMY, TOTAL ABDOMINAL (CERVIX REMOVED)  2/?    TX DELIGATION URETER N/A 02/14/2019    USLS POSSIBLE SSLS performed by Shadi Nogueira MD at [facility]    TX TENDON SHEATH INCISION

## 2025-06-30 DIAGNOSIS — I48.0 PAROXYSMAL ATRIAL FIBRILLATION (HCC): ICD-10-CM

## 2025-06-30 NOTE — TELEPHONE ENCOUNTER
Requesting medication refill. Please approve or deny this request.    Rx requested:  Requested Prescriptions     Pending Prescriptions Disp Refills    apixaban (ELIQUIS) 5 MG TABS tablet 180 tablet 1     Sig: Take 1 tablet by mouth 2 times daily         Last Office Visit:   6/5/2025      Next Visit Date:  Future Appointments   Date Time Provider Department Center   9/4/2025 10:15 AM Garett Arambula MD [facility] DEP   12/11/2025  2:00 PM Telly Hyman MD [facility]

## 2025-09-04 ENCOUNTER — OFFICE VISIT (OUTPATIENT)
Dept: FAMILY MEDICINE CLINIC | Age: 80
End: 2025-09-04

## 2025-09-04 VITALS
HEART RATE: 77 BPM | SYSTOLIC BLOOD PRESSURE: 110 MMHG | HEIGHT: 61 IN | BODY MASS INDEX: 28.32 KG/M2 | OXYGEN SATURATION: 96 % | DIASTOLIC BLOOD PRESSURE: 62 MMHG | WEIGHT: 150 LBS

## 2025-09-04 DIAGNOSIS — I10 PRIMARY HYPERTENSION: ICD-10-CM

## 2025-09-04 DIAGNOSIS — L57.0 ACTINIC KERATOSES: ICD-10-CM

## 2025-09-04 DIAGNOSIS — Z00.00 MEDICARE ANNUAL WELLNESS VISIT, SUBSEQUENT: Primary | ICD-10-CM

## 2025-09-04 DIAGNOSIS — I48.11 LONGSTANDING PERSISTENT ATRIAL FIBRILLATION (HCC): ICD-10-CM

## 2025-09-04 DIAGNOSIS — R73.03 PREDIABETES: ICD-10-CM

## 2025-09-04 DIAGNOSIS — E78.2 MIXED HYPERLIPIDEMIA: ICD-10-CM

## 2025-09-04 DIAGNOSIS — J44.9 STAGE 1 MILD COPD BY GOLD CLASSIFICATION (HCC): ICD-10-CM

## 2025-09-04 SDOH — HEALTH STABILITY: PHYSICAL HEALTH: ON AVERAGE, HOW MANY DAYS PER WEEK DO YOU ENGAGE IN MODERATE TO STRENUOUS EXERCISE (LIKE A BRISK WALK)?: 1 DAY

## 2025-09-04 SDOH — HEALTH STABILITY: PHYSICAL HEALTH: ON AVERAGE, HOW MANY MINUTES DO YOU ENGAGE IN EXERCISE AT THIS LEVEL?: 40 MIN

## 2025-09-04 ASSESSMENT — PATIENT HEALTH QUESTIONNAIRE - PHQ9
1. LITTLE INTEREST OR PLEASURE IN DOING THINGS: NOT AT ALL
SUM OF ALL RESPONSES TO PHQ QUESTIONS 1-9: 0
SUM OF ALL RESPONSES TO PHQ QUESTIONS 1-9: 0
2. FEELING DOWN, DEPRESSED OR HOPELESS: NOT AT ALL
SUM OF ALL RESPONSES TO PHQ QUESTIONS 1-9: 0
SUM OF ALL RESPONSES TO PHQ QUESTIONS 1-9: 0

## 2025-09-04 ASSESSMENT — LIFESTYLE VARIABLES
HOW MANY STANDARD DRINKS CONTAINING ALCOHOL DO YOU HAVE ON A TYPICAL DAY: 1
HOW OFTEN DO YOU HAVE A DRINK CONTAINING ALCOHOL: 2-4 TIMES A MONTH
HOW MANY STANDARD DRINKS CONTAINING ALCOHOL DO YOU HAVE ON A TYPICAL DAY: 1 OR 2
HOW OFTEN DO YOU HAVE SIX OR MORE DRINKS ON ONE OCCASION: 1
HOW OFTEN DO YOU HAVE A DRINK CONTAINING ALCOHOL: 3

## (undated) DEVICE — SPONGE,LAP,18"X18",DLX,XR,ST,5/PK,40/PK: Brand: MEDLINE

## (undated) DEVICE — 1010 S-DRAPE TOWEL DRAPE 10/BX: Brand: STERI-DRAPE™

## (undated) DEVICE — ELASTIC BANDAGE: Brand: DEROYAL

## (undated) DEVICE — COVER LT HNDL BLU PLAS

## (undated) DEVICE — SUTURE VCRL SZ 0 L36IN ABSRB UD L36MM CT-1 1/2 CIR J946H

## (undated) DEVICE — GLOVE SURG SZ 9 THK91MIL LTX FREE SYN POLYISOPRENE ANTI

## (undated) DEVICE — SHEARS ENDOSCP L36CM DIA5MM ULTRASONIC CRV TIP HARM

## (undated) DEVICE — Z DISCONTINUED NO SUB IDED PACKING VAG W2XL180IN 28X24 MESH 4 PLY SURG GZ FAN FLD RADPQ

## (undated) DEVICE — DRESSING GZ W1XL8IN COT XRFRM N ADH OVERWRAP CURAD

## (undated) DEVICE — TRAY PREP DRY W/ PREM GLV 2 APPL 6 SPNG 2 UNDPD 1 OVERWRAP

## (undated) DEVICE — GOWN,AURORA,NONREINFORCED,LARGE: Brand: MEDLINE

## (undated) DEVICE — SUTURE MCRYL SZ 4-0 L27IN ABSRB UD L19MM PS-2 1/2 CIR PRIM Y426H

## (undated) DEVICE — LITHOTOMY DRAPE WITH FLUID CONTROL POUCH: Brand: CONVERTORS

## (undated) DEVICE — INTENDED FOR TISSUE SEPARATION, AND OTHER PROCEDURES THAT REQUIRE A SHARP SURGICAL BLADE TO PUNCTURE OR CUT.: Brand: BARD-PARKER ® CARBON RIB-BACK BLADES

## (undated) DEVICE — Device

## (undated) DEVICE — VCARE MEDIUM, UTERINE MANIPULATOR, VAGINAL-CERVICAL-AHLUWALIA'S-RETRACTOR-ELEVATOR: Brand: VCARE

## (undated) DEVICE — ENDO CARRY-ON PROCEDURE KIT: Brand: ENDO CARRY-ON PROCEDURE KIT

## (undated) DEVICE — ZIMMER® STERILE DISPOSABLE TOURNIQUET CUFF, DUAL PORT, SINGLE BLADDER, 18 IN. (46 CM)

## (undated) DEVICE — SUTURE VCRL SZ 0 L27IN ABSRB UD SH L26MM 1/2 CIR TAPERPOINT J418H

## (undated) DEVICE — GAUZE SPONGES,12 PLY: Brand: CURITY

## (undated) DEVICE — COUNTER NDL 40 COUNT HLD 70 FOAM BLK ADH W/ MAG

## (undated) DEVICE — ELECTRODE PT RET AD L9FT HI MOIST COND ADH HYDRGEL CORDED

## (undated) DEVICE — PLUMEPORT LAPAROSCOPIC SMOKE FILTRATION DEVICE: Brand: PLUMEPORT ACTIV

## (undated) DEVICE — CHLORAPREP 26ML ORANGE

## (undated) DEVICE — TOTAL TRAY, DB, 100% SILI FOLEY, 16FR 10: Brand: MEDLINE

## (undated) DEVICE — SYRINGE BLB 50CC IRRIG PLIABLE FNGR FLNG GRAD FLSK DISP

## (undated) DEVICE — LINER PAD CONTOUR SUPER PEACH 7X14IN

## (undated) DEVICE — SUTURE CAPTURING DEVICE: Brand: CAPIO SLIM

## (undated) DEVICE — HYPODERMIC SAFETY NEEDLE: Brand: MAGELLAN

## (undated) DEVICE — GOWN,SIRUS,POLYRNF,BRTHSLV,XLN/XL,20/CS: Brand: MEDLINE

## (undated) DEVICE — WARMER SCP 2 ANTIFOG LAP DISP

## (undated) DEVICE — Device: Brand: ENDO SMARTCAP

## (undated) DEVICE — BRUSH ENDO CLN L90.5IN SHTH DIA1.7MM BRIST DIA5-7MM 2-6MM

## (undated) DEVICE — TUBE SET 96 MM 64 MM H2O PERISTALTIC STD AUX CHANNEL

## (undated) DEVICE — SYRINGE IRRIG 60ML SFT PLIABLE BLB EZ TO GRP 1 HND USE W/

## (undated) DEVICE — CYSTO/BLADDER IRRIGATION SET, REGULATING CLAMP

## (undated) DEVICE — TUBING, SUCTION, 1/4" X 10', STRAIGHT: Brand: MEDLINE

## (undated) DEVICE — GAUZE,SPONGE,4"X4",16PLY,XRAY,STRL,LF: Brand: MEDLINE

## (undated) DEVICE — TROCAR ENDOSCP L100MM DIA12MM BLDELSS OBT RADLUC STBL SL

## (undated) DEVICE — HAND II: Brand: MEDLINE INDUSTRIES, INC.

## (undated) DEVICE — KIT,ANTI FOG,W/SPONGE & FLUID,SOFT PACK: Brand: MEDLINE

## (undated) DEVICE — DRAPE, LAVH, STERILE: Brand: MEDLINE

## (undated) DEVICE — SUTURE ETHLN SZ 4-0 L18IN NONABSORBABLE BLK L19MM PS-2 3/8 1667H

## (undated) DEVICE — SYRINGE MED 10ML TRNSLUC BRL PLUNG BLK MRK POLYPR CTRL

## (undated) DEVICE — GLOVE ORANGE PI 7 1/2   MSG9075

## (undated) DEVICE — SINGLE PORT MANIFOLD: Brand: NEPTUNE 2

## (undated) DEVICE — NEEDLE INSUF L120MM ULT VERES ENDOPATH

## (undated) DEVICE — MEDI-VAC YANKAUER SUCTION HANDLE W/BULBOUS TIP: Brand: CARDINAL HEALTH

## (undated) DEVICE — GLOVE ORANGE PI 7   MSG9070

## (undated) DEVICE — TUBING INSUF 03UM FLTR W LUERLOCK CPC CONN

## (undated) DEVICE — GLOVE SURG SZ 85 L12IN FNGR THK94MIL TRNSLUC YEL LTX

## (undated) DEVICE — TROCAR ENDOSCP L100MM DIA5MM BLDELSS STBL SL OBT RADLUC

## (undated) DEVICE — UNDERCAST PADDING: Brand: DEROYAL

## (undated) DEVICE — LABEL MED MINI W/ MARKER

## (undated) DEVICE — KIT GYN W/ 1 L SNOWMAN DISP RETRCT RNG 5MM SHRP HK STAY TWO

## (undated) DEVICE — MARKER SURG SKIN GENTIAN VLT REG TIP W/ 6IN RUL

## (undated) DEVICE — MATTRESS OVERLAY EGGCRATE 72X33IN FOAM PAD

## (undated) DEVICE — PACK,SET UP,DRAPE: Brand: MEDLINE

## (undated) DEVICE — COVER,TABLE,44X90,STERILE: Brand: MEDLINE

## (undated) DEVICE — ELECTROSURGICAL PENCIL BUTTON SWITCH E-Z CLEAN COATED BLADE ELECTRODE 10 FT (3 M) CORD HOLSTER: Brand: MEGADYNE

## (undated) DEVICE — POUCH, INSTRUMENT, 3POCKET, INVISISHIELD: Brand: MEDLINE